# Patient Record
Sex: MALE | Race: WHITE | NOT HISPANIC OR LATINO | Employment: OTHER | URBAN - METROPOLITAN AREA
[De-identification: names, ages, dates, MRNs, and addresses within clinical notes are randomized per-mention and may not be internally consistent; named-entity substitution may affect disease eponyms.]

---

## 2017-06-05 ENCOUNTER — ALLSCRIPTS OFFICE VISIT (OUTPATIENT)
Dept: OTHER | Facility: OTHER | Age: 82
End: 2017-06-05

## 2017-07-03 ENCOUNTER — APPOINTMENT (OUTPATIENT)
Dept: LAB | Facility: HOSPITAL | Age: 82
End: 2017-07-03
Attending: FAMILY MEDICINE
Payer: COMMERCIAL

## 2017-07-03 ENCOUNTER — TRANSCRIBE ORDERS (OUTPATIENT)
Dept: ADMINISTRATIVE | Facility: HOSPITAL | Age: 82
End: 2017-07-03

## 2017-07-03 DIAGNOSIS — E03.9 UNSPECIFIED HYPOTHYROIDISM: Primary | ICD-10-CM

## 2017-07-03 DIAGNOSIS — E03.9 UNSPECIFIED HYPOTHYROIDISM: ICD-10-CM

## 2017-07-03 LAB — TSH SERPL DL<=0.05 MIU/L-ACNC: 1.73 UIU/ML (ref 0.36–3.74)

## 2017-07-03 PROCEDURE — 36415 COLL VENOUS BLD VENIPUNCTURE: CPT

## 2017-07-03 PROCEDURE — 84443 ASSAY THYROID STIM HORMONE: CPT

## 2017-07-05 ENCOUNTER — GENERIC CONVERSION - ENCOUNTER (OUTPATIENT)
Dept: OTHER | Facility: OTHER | Age: 82
End: 2017-07-05

## 2017-09-07 ENCOUNTER — ALLSCRIPTS OFFICE VISIT (OUTPATIENT)
Dept: OTHER | Facility: OTHER | Age: 82
End: 2017-09-07

## 2017-09-07 DIAGNOSIS — M25.552 PAIN IN LEFT HIP: ICD-10-CM

## 2017-09-08 ENCOUNTER — HOSPITAL ENCOUNTER (OUTPATIENT)
Dept: RADIOLOGY | Facility: HOSPITAL | Age: 82
Discharge: HOME | End: 2017-09-08
Payer: COMMERCIAL

## 2017-09-08 DIAGNOSIS — M25.552 PAIN IN LEFT HIP: ICD-10-CM

## 2017-09-08 PROCEDURE — 73502 X-RAY EXAM HIP UNI 2-3 VIEWS: CPT

## 2017-09-11 ENCOUNTER — GENERIC CONVERSION - ENCOUNTER (OUTPATIENT)
Dept: OTHER | Facility: OTHER | Age: 82
End: 2017-09-11

## 2017-09-14 ENCOUNTER — GENERIC CONVERSION - ENCOUNTER (OUTPATIENT)
Dept: OTHER | Facility: OTHER | Age: 82
End: 2017-09-14

## 2017-09-23 ENCOUNTER — ALLSCRIPTS OFFICE VISIT (OUTPATIENT)
Dept: OTHER | Facility: OTHER | Age: 82
End: 2017-09-23

## 2017-10-02 ENCOUNTER — GENERIC CONVERSION - ENCOUNTER (OUTPATIENT)
Dept: OTHER | Facility: OTHER | Age: 82
End: 2017-10-02

## 2017-10-02 LAB
LEFT EYE DIABETIC RETINOPATHY: NORMAL
RIGHT EYE DIABETIC RETINOPATHY: NORMAL

## 2017-11-09 ENCOUNTER — GENERIC CONVERSION - ENCOUNTER (OUTPATIENT)
Dept: OTHER | Facility: OTHER | Age: 82
End: 2017-11-09

## 2017-11-09 ENCOUNTER — TRANSCRIBE ORDERS (OUTPATIENT)
Dept: ADMINISTRATIVE | Facility: HOSPITAL | Age: 82
End: 2017-11-09

## 2017-11-09 DIAGNOSIS — M25.552 LEFT HIP PAIN: Primary | ICD-10-CM

## 2017-11-09 DIAGNOSIS — M16.12 PRIMARY OSTEOARTHRITIS OF LEFT HIP: ICD-10-CM

## 2017-11-15 ENCOUNTER — HOSPITAL ENCOUNTER (OUTPATIENT)
Dept: RADIOLOGY | Facility: HOSPITAL | Age: 82
Discharge: HOME | End: 2017-11-15
Attending: ORTHOPAEDIC SURGERY | Admitting: RADIOLOGY
Payer: COMMERCIAL

## 2017-11-15 DIAGNOSIS — M25.552 LEFT HIP PAIN: ICD-10-CM

## 2017-11-15 PROCEDURE — 20610 DRAIN/INJ JOINT/BURSA W/O US: CPT

## 2017-11-15 PROCEDURE — 77002 NEEDLE LOCALIZATION BY XRAY: CPT

## 2017-11-15 RX ORDER — BUPIVACAINE HYDROCHLORIDE 2.5 MG/ML
30 INJECTION, SOLUTION EPIDURAL; INFILTRATION; INTRACAUDAL
Status: COMPLETED | OUTPATIENT
Start: 2017-11-15 | End: 2017-11-15

## 2017-11-15 RX ORDER — METHYLPREDNISOLONE ACETATE 80 MG/ML
80 INJECTION, SUSPENSION INTRA-ARTICULAR; INTRALESIONAL; INTRAMUSCULAR; SOFT TISSUE
Status: COMPLETED | OUTPATIENT
Start: 2017-11-15 | End: 2017-11-15

## 2017-11-15 RX ORDER — LIDOCAINE HYDROCHLORIDE 10 MG/ML
10 INJECTION, SOLUTION INFILTRATION; PERINEURAL
Status: COMPLETED | OUTPATIENT
Start: 2017-11-15 | End: 2017-11-15

## 2017-11-15 RX ADMIN — BUPIVACAINE HYDROCHLORIDE 2 ML: 2.5 INJECTION, SOLUTION EPIDURAL; INFILTRATION; INTRACAUDAL at 13:20

## 2017-11-15 RX ADMIN — METHYLPREDNISOLONE ACETATE 80 MG: 80 INJECTION, SUSPENSION INTRA-ARTICULAR; INTRALESIONAL; INTRAMUSCULAR; SOFT TISSUE at 13:20

## 2017-11-15 RX ADMIN — LIDOCAINE HYDROCHLORIDE 2 ML: 10 INJECTION, SOLUTION INFILTRATION; PERINEURAL at 13:20

## 2017-11-15 RX ADMIN — IOHEXOL 3 ML: 300 INJECTION, SOLUTION INTRAVENOUS at 13:20

## 2017-12-04 ENCOUNTER — TRANSCRIBE ORDERS (OUTPATIENT)
Dept: ADMINISTRATIVE | Facility: HOSPITAL | Age: 82
End: 2017-12-04

## 2017-12-04 ENCOUNTER — APPOINTMENT (OUTPATIENT)
Dept: LAB | Facility: HOSPITAL | Age: 82
End: 2017-12-04
Attending: FAMILY MEDICINE
Payer: COMMERCIAL

## 2017-12-04 DIAGNOSIS — E11.9 CONTROLLED TYPE 2 DIABETES MELLITUS WITHOUT COMPLICATION, WITHOUT LONG-TERM CURRENT USE OF INSULIN (HCC): ICD-10-CM

## 2017-12-04 DIAGNOSIS — I51.9 MYXEDEMA HEART DISEASE: ICD-10-CM

## 2017-12-04 DIAGNOSIS — E03.9 MYXEDEMA HEART DISEASE: Primary | ICD-10-CM

## 2017-12-04 DIAGNOSIS — I51.9 MYXEDEMA HEART DISEASE: Primary | ICD-10-CM

## 2017-12-04 DIAGNOSIS — E03.9 MYXEDEMA HEART DISEASE: ICD-10-CM

## 2017-12-04 LAB
ALBUMIN SERPL BCP-MCNC: 3.4 G/DL (ref 3.5–5)
ALP SERPL-CCNC: 48 U/L (ref 46–116)
ALT SERPL W P-5'-P-CCNC: 25 U/L (ref 12–78)
ANION GAP SERPL CALCULATED.3IONS-SCNC: 5 MMOL/L (ref 4–13)
AST SERPL W P-5'-P-CCNC: 18 U/L (ref 5–45)
BILIRUB SERPL-MCNC: 0.82 MG/DL (ref 0.2–1)
BUN SERPL-MCNC: 21 MG/DL (ref 5–25)
CALCIUM SERPL-MCNC: 8.9 MG/DL (ref 8.3–10.1)
CHLORIDE SERPL-SCNC: 105 MMOL/L (ref 100–108)
CO2 SERPL-SCNC: 30 MMOL/L (ref 21–32)
CREAT SERPL-MCNC: 0.99 MG/DL (ref 0.6–1.3)
CREAT UR-MCNC: 88.9 MG/DL
ERYTHROCYTE [DISTWIDTH] IN BLOOD BY AUTOMATED COUNT: 13.1 % (ref 11.6–15.1)
EST. AVERAGE GLUCOSE BLD GHB EST-MCNC: 131 MG/DL
GFR SERPL CREATININE-BSD FRML MDRD: 71 ML/MIN/1.73SQ M
GLUCOSE P FAST SERPL-MCNC: 131 MG/DL (ref 65–99)
HBA1C MFR BLD: 6.2 % (ref 4.2–6.3)
HCT VFR BLD AUTO: 40.6 % (ref 36.5–49.3)
HGB BLD-MCNC: 13.6 G/DL (ref 12–17)
MCH RBC QN AUTO: 30.2 PG (ref 26.8–34.3)
MCHC RBC AUTO-ENTMCNC: 33.5 G/DL (ref 31.4–37.4)
MCV RBC AUTO: 90 FL (ref 82–98)
MICROALBUMIN UR-MCNC: <5 MG/L (ref 0–20)
MICROALBUMIN/CREAT 24H UR: <6 MG/G CREATININE (ref 0–30)
PLATELET # BLD AUTO: 158 THOUSANDS/UL (ref 149–390)
PMV BLD AUTO: 10 FL (ref 8.9–12.7)
POTASSIUM SERPL-SCNC: 4.1 MMOL/L (ref 3.5–5.3)
PROT SERPL-MCNC: 6.7 G/DL (ref 6.4–8.2)
RBC # BLD AUTO: 4.51 MILLION/UL (ref 3.88–5.62)
SODIUM SERPL-SCNC: 140 MMOL/L (ref 136–145)
TSH SERPL DL<=0.05 MIU/L-ACNC: 3.66 UIU/ML (ref 0.36–3.74)
WBC # BLD AUTO: 7.06 THOUSAND/UL (ref 4.31–10.16)

## 2017-12-04 PROCEDURE — 82570 ASSAY OF URINE CREATININE: CPT | Performed by: FAMILY MEDICINE

## 2017-12-04 PROCEDURE — 85027 COMPLETE CBC AUTOMATED: CPT

## 2017-12-04 PROCEDURE — 80053 COMPREHEN METABOLIC PANEL: CPT

## 2017-12-04 PROCEDURE — 84443 ASSAY THYROID STIM HORMONE: CPT

## 2017-12-04 PROCEDURE — 36415 COLL VENOUS BLD VENIPUNCTURE: CPT

## 2017-12-04 PROCEDURE — 82043 UR ALBUMIN QUANTITATIVE: CPT | Performed by: FAMILY MEDICINE

## 2017-12-04 PROCEDURE — 83036 HEMOGLOBIN GLYCOSYLATED A1C: CPT

## 2017-12-06 ENCOUNTER — GENERIC CONVERSION - ENCOUNTER (OUTPATIENT)
Dept: OTHER | Facility: OTHER | Age: 82
End: 2017-12-06

## 2017-12-14 ENCOUNTER — GENERIC CONVERSION - ENCOUNTER (OUTPATIENT)
Dept: OTHER | Facility: OTHER | Age: 82
End: 2017-12-14

## 2017-12-14 ENCOUNTER — ALLSCRIPTS OFFICE VISIT (OUTPATIENT)
Dept: OTHER | Facility: OTHER | Age: 82
End: 2017-12-14

## 2017-12-15 NOTE — PROGRESS NOTES
Assessment  1  Medicare annual wellness visit, subsequent (V70 0) (Z00 00)   2  Loss of hearing (389 9) (H91 90)   3  Controlled diabetes mellitus type II without complication (731 79) (Y96 0)   4  Hypothyroidism (244 9) (E03 9)    Plan  Controlled diabetes mellitus type II without complication, Hypothyroidism    · Follow-up visit in 6 months Evaluation and Treatment  Follow-up  Status: Hold For -Scheduling  Requested for: 56FGQ1997  Medicare annual wellness visit, subsequent    · Follow-up visit in 1 year Evaluation and Treatment  Follow-up  Status: Hold For -Scheduling  Requested for: 68GWW0566   · *VB - Fall Risk Assessment  (Dx Z13 89 Screen for Neurologic Disorder);Status:Complete;   Done: 43CFC0587 12:00AM   · *VB - Urinary Incontinence Screen (Dx Z13 89 Screen for UI); Status:Complete;   Done:97Rjf4226 12:00AM   · *VB-Depression Screening; Status:Complete;   Done: 23JOG8825 12:00AM    Discussion/Summary    Reviewed recent iuhaL8Y 6 2Microalbumin negCMP, CBC OKTSH OKImmuniz are up to dateMeds sameNo labs next time  Chief Complaint  Patient presents for a 6 month follow up      History of Present Illness  Generally feeling goodLeft hip better since he had injection from ortho (see note)His dog had leptospirosis and they all needed to be treated      Review of Systems   Constitutional: as noted in HPI  Eyes: recent eye check - new prescription  ENT: hearing loss-- and-- has cochlear implant  Cardiovascular: no chest pain,-- no palpitations-- and-- no extremity edema  Respiratory: no cough-- and-- no shortness of breath during exertion  Gastrointestinal: BM daily / no dyspepsia  Genitourinary: nocturia-- and-- 1-2 times a night  Musculoskeletal: as noted in HPI  Integumentary: chronic skin changes - has had multiple lesions removed - Dr Kostas patel, but-- no rashes-- and-- no skin lesions  Neurological: no headache-- and-- no confusion  Psychiatric: no anxiety-- and-- no depression  Active Problems  1  Allergic rhinitis (477 9) (J30 9)   2  Controlled diabetes mellitus type II without complication (960 52) (Q75 6)   3  Hypothyroidism (244 9) (E03 9)   4  Left hip pain (719 45) (M25 552)   5  Leptospirosis (100 9) (A27 9)   6  Loss of hearing (389 9) (H91 90)   7  Male erectile dysfunction, unspecified (607 84) (N52 9)   8  Need for influenza vaccination (V04 81) (Z23)   9  Osteoarthritis of knee (715 36) (M17 10)   10  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)   11  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)   12  Primary osteoarthritis of left hip (715 15) (M16 12)   13  RBBB (right bundle branch block) (426 4) (I45 10)   14  Seborrheic keratoses (702 19) (L82 1)    Past Medical History  1  History of Abnormal loss of weight (783 21) (R63 4)   2  History of Bruising (924 9) (T14 8XXA)   3  History of Colon, diverticulosis (562 10) (K57 30)   4  History of diverticulitis of colon (V12 79) (Z87 19)    Surgical History  1  History of Appendectomy   2  History of Cataract Surgery   3  History of Colon Surgery   4  History of Colonoscopy (Fiberoptic)   5  History of Inner Ear Surgery Cochlear Device Implantation   6  History of Shoulder Surgery    Family History  Daughter    1  Family history of Diabetes Mellitus (V18 0)  Sister    2  Family history of Coronary Artery Disease (V17 49)   3  Family history of Diabetes Mellitus (V18 0)    The family history was reviewed and updated today  Social History   · Being A Social Drinker   · Never A Smoker  The social history was reviewed and updated today  Current Meds   1  Levothyroxine Sodium 25 MCG Oral Tablet; Take 1 tablet by mouth  daily; Therapy: 90VUB3187 to (Evaluate:28Eyo1997)  Requested for: 81SJY7033; Last Rx:13Jwf6349 Ordered   2  Viagra 50 MG Oral Tablet; TAKE AS DIRECTED; Therapy: 39UMT7610 to (Evaluate:29Mhv6692); Last Rx:13Jfg9183 Ordered    The medication list was reviewed and updated today  Allergies  1   MetroNIDAZOLE TABS    Vitals  Vital Signs    Recorded: 09NHC2653 06:59PM   Temperature 97 1 F, Tympanic   Heart Rate 78, R Radial   Pulse Quality Normal, R Radial   Respiration Quality Normal   Respiration 16   Systolic 627, LUE, Sitting   Diastolic 70, LUE, Sitting   Height 5 ft 9 in   Weight 149 lb 4 oz   BMI Calculated 22 04   BSA Calculated 1 82   Pain Scale 0     Physical Exam   Constitutional  General appearance: No acute distress, well appearing and well nourished  Pulmonary  Respiratory effort: No increased work of breathing or signs of respiratory distress  Auscultation of lungs: Clear to auscultation, equal breath sounds bilaterally, no wheezes, no rales, no rhonci  Cardiovascular  Auscultation of heart: Normal rate and rhythm, normal S1 and S2, without murmurs     Examination of extremities for edema and/or varicosities: Normal    Carotid pulses: Normal    Musculoskeletal  Gait and station: Normal    Psychiatric  Orientation to person, place and time: Normal    Mood and affect: Normal          Results/Data  *VB - Fall Risk Assessment  (Dx Z13 89 Screen for Neurologic Disorder) 07CLA6070 12:00AM AbkyliesaeDandre     Test Name Result Flag Reference   Falls Risk      No falls in the past year     *VB - Urinary Incontinence Screen (Dx Z13 89 Screen for UI) 53EZW6410 12:00AM MartineDaliaraimundo Nava     Test Name Result Flag Reference   Urinary Incontinence Assessment 32MPX1317       *VB-Depression Screening 15IPH3815 12:00AM Neva Pyle     Test Name Result Flag Reference   Depression Scale Result      Depression Screen - Negative For Symptoms     (1) CBC/ PLT (NO DIFF) 09HCW5385 07:43AM Neva Levyara     Test Name Result Flag Reference   HEMATOCRIT 40 6 %  36 5-49 3   HEMOGLOBIN 13 6 g/dL  12 0-17 0   MCHC 33 5 g/dL  31 4-37 4   MCH 30 2 pg  26 8-34 3   MCV 90 fL  82-98   PLATELET COUNT 313 Thousands/uL  149-390   RBC COUNT 4 51 Million/uL  3 88-5 62   RDW 13 1 %  11 6-15 1   WBC COUNT 7 06 Thousand/uL  4 31-10 16   MPV 10 0 fL  8 9-12 7     (1) COMPREHENSIVE METABOLIC PANEL 73NKP9689 44:18PP Evy Farley     Test Name Result Flag Reference   SODIUM 140 mmol/L  136-145   POTASSIUM 4 1 mmol/L  3 5-5 3   CHLORIDE 105 mmol/L  100-108   CARBON DIOXIDE 30 mmol/L  21-32   ANION GAP (CALC) 5 mmol/L  4-13   BLOOD UREA NITROGEN 21 mg/dL  5-25   CREATININE 0 99 mg/dL  0 60-1 30   Standardized to IDMS reference method   CALCIUM 8 9 mg/dL  8 3-10 1   BILI, TOTAL 0 82 mg/dL  0 20-1 00   ALK PHOSPHATAS 48 U/L     ALT (SGPT) 25 U/L  12-78   Specimen collection should occur prior to Sulfasalazine administration due to the potential for falsely depressed results  AST(SGOT) 18 U/L  5-45   Specimen collection should occur prior to Sulfasalazine administration due to the potential for falsely depressed results  ALBUMIN 3 4 g/dL L 3 5-5 0   TOTAL PROTEIN 6 7 g/dL  6 4-8 2   eGFR 71 ml/min/1 73sq m     This is a patient instruction: Patient fasting for 8 hours or longer recommended  National Kidney Disease Education Program recommendations are as follows: GFR calculation is accurate only with a steady state creatinine Chronic Kidney disease less than 60 ml/min/1 73 sq  meters Kidney failure less than 15 ml/min/1 73 sq  meters  GLUCOSE FASTING 131 mg/dL H 65-99   Specimen collection should occur prior to Sulfasalazine administration due to the potential for falsely depressed results  Specimen collection should occur prior to Sulfapyridine administration due to the potential for falsely elevated results  (1) TSH 69LJD9027 07:43AM Evy Farley     Test Name Result Flag Reference   TSH 3 663 uIU/mL  0 358-3 740   This is a patient instruction: This test is non-fasting  Please drink two glasses of water morning of bloodwork  Patients undergoing fluorescein dye angiography may retain small amounts of fluorescein in the body for 48-72 hours post procedure  Samples containing fluorescein can produce falsely depressed TSH values   If the patient had this procedure,a specimen should be resubmitted post fluorescein clearance  (1) MICROALBUMIN CREATININE RATIO, RANDOM URINE 69DGH1461 07:43AM Kimi Farley     Test Name Result Flag Reference   MICROALBUMIN/ CREAT R <6 mg/g creatinine  0-30   MICROALBUMIN,URINE <5 0 mg/L  0 0-20 0   CREATININE URINE 88 9 mg/dL       (1) HEMOGLOBIN A1C 55QJU5885 07:43AM Kimi Farley     Test Name Result Flag Reference   HEMOGLOBIN A1C 6 2 %  4 2-6 3   EST  AVG   GLUCOSE 131 mg/dl       Signatures   Electronically signed by : JODI Mehta ; Dec 14 2017  8:35PM EST                       (Author)

## 2018-01-10 NOTE — RESULT NOTES
Verified Results  * XR HIP/PELV 2-3 VWS LEFT W PELVIS IF PERFORMED 40Xjd8062 09:17AM Atul Ramirezt Order Number: [id number]     Test Name Result Flag Reference   * XR HIP/PELV 2-3 VWS LEFT (Report)     LEFT HIP     INDICATION: Left hip pain  COMPARISON: None     VIEWS: AP pelvis and 2 coned down views     IMAGES: 3     FINDINGS:     There is no fracture or dislocation  There is mild joint space narrowing with acetabular and femoral osteophytes  There is no evidence of erosive arthropathy  No lytic or blastic lesions are seen  Soft tissues are unremarkable  IMPRESSION:     Degenerative changes  No acute bony abnormality in the left hip         Workstation performed: [id number]     Signed by:   Jeromy Storey MD   9/11/17

## 2018-01-11 NOTE — RESULT NOTES
Verified Results  (1) CBC/PLT/DIFF 17Oct2016 07:24AM Jos Resendiz     Test Name Result Flag Reference   WBC COUNT 5 97 Thousand/uL  4 31-10 16   RBC COUNT 4 49 Million/uL  3 88-5 62   HEMOGLOBIN 13 6 g/dL  12 0-17 0   HEMATOCRIT 39 8 %  36 5-49 3   MCV 89 fL  82-98   MCH 30 3 pg  26 8-34 3   MCHC 34 2 g/dL  31 4-37 4   RDW 12 6 %  11 6-15 1   MPV 10 3 fL  8 9-12 7   PLATELET COUNT 700 Thousands/uL  149-390   nRBC AUTOMATED 0 /100 WBCs     NEUTROPHILS RELATIVE PERCENT 56 %  43-75   LYMPHOCYTES RELATIVE PERCENT 33 %  14-44   MONOCYTES RELATIVE PERCENT 8 %  4-12   EOSINOPHILS RELATIVE PERCENT 3 %  0-6   BASOPHILS RELATIVE PERCENT 0 %  0-1   NEUTROPHILS ABSOLUTE COUNT 3 33 Thousands/?L  1 85-7 62   LYMPHOCYTES ABSOLUTE COUNT 1 95 Thousands/?L  0 60-4 47   MONOCYTES ABSOLUTE COUNT 0 47 Thousand/?L  0 17-1 22   EOSINOPHILS ABSOLUTE COUNT 0 20 Thousand/?L  0 00-0 61   BASOPHILS ABSOLUTE COUNT 0 02 Thousands/?L  0 00-0 10     (1) COMPREHENSIVE METABOLIC PANEL 50VYU7647 20:96CM Deonna Farley     Test Name Result Flag Reference   GLUCOSE,RANDM 114 mg/dL     If the patient is fasting, the ADA then defines impaired fasting glucose as > 100 mg/dL and diabetes as > or equal to 123 mg/dL  SODIUM 142 mmol/L  136-145   POTASSIUM 3 9 mmol/L  3 5-5 3   CHLORIDE 105 mmol/L  100-108   CARBON DIOXIDE 30 mmol/L  21-32   ANION GAP (CALC) 7 mmol/L  4-13   BLOOD UREA NITROGEN 19 mg/dL  5-25   CREATININE 1 05 mg/dL  0 60-1 30   Standardized to IDMS reference method   CALCIUM 8 5 mg/dL  8 3-10 1   BILI, TOTAL 0 64 mg/dL  0 20-1 00   ALK PHOSPHATAS 47 U/L     ALT (SGPT) 21 U/L  12-78   AST(SGOT) 19 U/L  5-45   ALBUMIN 3 4 g/dL L 3 5-5 0   TOTAL PROTEIN 7 0 g/dL  6 4-8 2   eGFR Non-African American      >60 0 ml/min/1 73sq [facility] Disease Education Program recommendations are as follows:  GFR calculation is accurate only with a steady state creatinine  Chronic Kidney disease less than 60 ml/min/1 73 sq  meters  Kidney failure less than 15 ml/min/1 73 sq  meters  (1) TSH 24JQO5972 07:24AM PatitosaeLian     Test Name Result Flag Reference   TSH 6 434 uIU/mL H 0 358-3 740   Patients undergoing fluorescein dye angiography may retain small amounts of fluorescein in the body for 48-72 hours post procedure  Samples containing fluorescein can produce falsely depressed TSH values  If the patient had this procedure,a specimen should be resubmitted post fluorescein clearance  (1) LIPID PANEL FASTING W DIRECT LDL REFLEX 61FRG5393 07:24AM Patitosae Tristanliyah Vitale     Test Name Result Flag Reference   CHOLESTEROL 179 mg/dL     LDL CHOLESTEROL CALCULATED 118 mg/dL H 0-100   Triglyceride:         Normal              <150 mg/dl       Borderline High    150-199 mg/dl       High               200-499 mg/dl       Very High          >499 mg/dl  Cholesterol:         Desirable        <200 mg/dl      Borderline High  200-239 mg/dl      High             >239 mg/dl  HDL Cholesterol:        High    >59 mg/dL      Low     <41 mg/dL  LDL Cholesterol:        Optimal          <100 mg/dl        Near Optimal     100-129 mg/dl        Above Optimal          Borderline High   130-159 mg/dl          High              160-189 mg/dl          Very High        >189 mg/dl  LDL CALCULATED:    This screening LDL is a calculated result  It does not have the accuracy of the Direct Measured LDL in the monitoring of patients with hyperlipidemia and/or statin therapy  Direct Measure LDL (LAC918) must be ordered separately in these patients  TRIGLYCERIDES 86 mg/dL  <=150   Specimen collection should occur prior to N-Acetylcysteine or Metamizole administration due to the potential for falsely depressed results  HDL,DIRECT 44 mg/dL  40-60   Specimen collection should occur prior to Metamizole administration due to the potential for falsely depressed results       (1) HEMOGLOBIN A1C 17Oct2016 07:24AM Patitosae Tristanliyah Vitale     Test Name Result Flag Reference HEMOGLOBIN A1C 6 2 %  4 2-6 3   EST  AVG   GLUCOSE 131 mg/dl       (1) MICROALBUMIN CREATININE RATIO, RANDOM URINE 17Oct2016 07:24AM Cheryle Farley     Test Name Result Flag Reference   MICROALBUMIN/ CREAT R <7 mg/g creatinine  0-30   MICROALBUMIN,URINE <5 0 mg/L  0 0-20 0   CREATININE URINE 71 5 mg/dL

## 2018-01-13 VITALS
HEART RATE: 68 BPM | HEIGHT: 69 IN | TEMPERATURE: 97.8 F | WEIGHT: 145.8 LBS | BODY MASS INDEX: 21.59 KG/M2 | SYSTOLIC BLOOD PRESSURE: 116 MMHG | RESPIRATION RATE: 16 BRPM | DIASTOLIC BLOOD PRESSURE: 82 MMHG

## 2018-01-13 NOTE — RESULT NOTES
Verified Results  (1) TSH [id number] 09:56AM Carri Farley Miss     Test Name Result Flag Reference   TSH 1 733 uIU/mL  0 358-3 740   This bloodwork is non-fasting  Please drink two glasses of water morning of  bloodwork  Patients undergoing fluorescein dye angiography may retain small amounts of fluorescein in the body for 48-72 hours post procedure  Samples containing fluorescein can produce falsely depressed TSH values  If the patient had this procedure,a specimen should be resubmitted post fluorescein clearance

## 2018-01-14 VITALS
DIASTOLIC BLOOD PRESSURE: 72 MMHG | SYSTOLIC BLOOD PRESSURE: 112 MMHG | BODY MASS INDEX: 21.51 KG/M2 | RESPIRATION RATE: 16 BRPM | HEART RATE: 72 BPM | HEIGHT: 69 IN | TEMPERATURE: 97 F | WEIGHT: 145.2 LBS

## 2018-01-14 VITALS
SYSTOLIC BLOOD PRESSURE: 128 MMHG | DIASTOLIC BLOOD PRESSURE: 76 MMHG | WEIGHT: 149.8 LBS | TEMPERATURE: 98.3 F | RESPIRATION RATE: 16 BRPM | HEART RATE: 84 BPM | BODY MASS INDEX: 22.19 KG/M2 | HEIGHT: 69 IN

## 2018-01-15 NOTE — RESULT NOTES
Message   Please call - thyroid has improved on current dose  Remains just a tad "sluggish" but I would like to continue current dose and then recheck TSH before next visist and adjust dose then if needed  Happy holidays! Verified Results  (1) TSH [id number] 07:50AM Gary Farley     Test Name Result Flag Reference   TSH 3 743 uIU/mL H 0 358-3 740   Patients undergoing fluorescein dye angiography may retain small amounts of fluorescein in the body for 48-72 hours post procedure  Samples containing fluorescein can produce falsely depressed TSH values  If the patient had this procedure,a specimen should be resubmitted post fluorescein clearance

## 2018-01-17 NOTE — MISCELLANEOUS
Message  Patient's wife called c/o that her  has severe pain due to postherpetic neuralgia  He was seen 3 weeks ago by [facility] for shingles  He completed course of antiviral therapy with Famvir for 7 days  He takes Gabapentin 100 mg 2 tab   twice daily  Recommended to take extra 100 mg at bedtime today, Advil 400 mg with food  Advised to increase dose of Gabapentin tomorrow  to 300 mg twice daily  Recommended to call office tomorrow morning to schedule appointment  for reevaluation       Signatures   Electronically signed by : JODI Corbett ; Aug 21 2016  9:01PM EST                       (Author)

## 2018-01-17 NOTE — RESULT NOTES
Verified Results  * XR HIP/PELV 2-3 VWS LEFT W PELVIS IF PERFORMED [id number] 09:17AM Fredy Johnston Order Number: [id number]     Test Name Result Flag Reference   * XR HIP/PELV 2-3 VWS LEFT (Report)     LEFT HIP     INDICATION: Left hip pain  COMPARISON: None     VIEWS: AP pelvis and 2 coned down views     IMAGES: 3     FINDINGS:     There is no fracture or dislocation  There is mild joint space narrowing with acetabular and femoral osteophytes  There is no evidence of erosive arthropathy  No lytic or blastic lesions are seen  Soft tissues are unremarkable  IMPRESSION:     Degenerative changes  No acute bony abnormality in the left hip         Workstation performed: [id number]     Signed by:   Charanjit Rodrigues MD   9/11/17       Signatures   Electronically signed by : CINDY Means; Sep 14 2017  2:04PM EST                       (Author)

## 2018-01-22 VITALS
HEART RATE: 85 BPM | BODY MASS INDEX: 21.98 KG/M2 | WEIGHT: 148.38 LBS | SYSTOLIC BLOOD PRESSURE: 107 MMHG | DIASTOLIC BLOOD PRESSURE: 69 MMHG | HEIGHT: 69 IN

## 2018-01-23 VITALS
HEART RATE: 78 BPM | HEIGHT: 69 IN | WEIGHT: 149.25 LBS | SYSTOLIC BLOOD PRESSURE: 120 MMHG | DIASTOLIC BLOOD PRESSURE: 70 MMHG | BODY MASS INDEX: 22.11 KG/M2 | RESPIRATION RATE: 16 BRPM | TEMPERATURE: 97.1 F

## 2018-01-23 NOTE — PROGRESS NOTES
Assessment    1  Medicare annual wellness visit, subsequent (V70 0) (Z00 00)   2  Loss of hearing (389 9) (H91 90)    Plan  Medicare annual wellness visit, subsequent    · Follow-up visit in 1 year Evaluation and Treatment  Follow-up  Status: Hold For -  Scheduling  Requested for: 59NWX5076   · *VB - Fall Risk Assessment  (Dx Z13 89 Screen for Neurologic Disorder);  Status:Complete;   Done: 78PGN6708 12:00AM   · *VB - Urinary Incontinence Screen (Dx Z13 89 Screen for UI); Status:Complete;   Done:  69TOM2987 12:00AM   · *VB-Depression Screening; Status:Complete;   Done: 88DOA6474 12:00AM    Discussion/Summary    Immuniz are up to date    No screenings indicated    Still plays golf and tries to be active  Diet good  Chief Complaint  Patient presents for an annual wellness visit      History of Present Illness  HPI: No concerns     Feels more isolated and less active once cold weather hits    Has decreased hearing and has had cochlear implant but still struggles    MMSE 29/30   Welcome to Medicare and Wellness Visits: The patient is being seen for the subsequent annual wellness visit  Medicare Screening and Risk Factors   Hospitalizations: no previous hospitalizations  Once per lifetime medicare screening tests: ECG has not been done and AAA screening US has not yet been done  Medicare Screening Tests Risk Questions   Osteoporosis risk assessment: over 48years of age  Drug and Alcohol Use: The patient has never smoked cigarettes  The patient reports rare alcohol use  He has never used illicit drugs  Diet and Physical Activity: Current diet includes well balanced meals, 2 servings of fruit per day, 1 servings of vegetables per day, 1 servings of meat per day, 1 servings of whole grains per day and 2 cups of tea per day  He is sedentary  Exercise: walking 10 minutes per day  Mood Disorder and Cognitive Impairment Screening:   Depression screening score was 0     negative for symptoms   He denies feeling down, depressed, or hopeless over the past two weeks  He denies feeling little interest or pleasure in doing things over the past two weeks  Functional Ability/Level of Safety: He uses a hearing aid  The patient is currently able to drive with limitations, but able to do instrumental activities of daily living without limitations and able to participate in social activities without limitations  Fall risk factors: The patient fell 0 times in the past 12 months  Advance Directives: Advance directives: durable power of  for health care directives and advance directives, but no living will  Co-Managers and Medical Equipment/Suppliers: See Patient Care Team   Reviewed Updated Nicole Omer:   Last Medicare Wellness Visit Information was reviewed, patient interviewed, no change since last AW  Preventive Quality Program 65 and Older: Falls Risk: The patient fell 0 times in the past 12 months  The patient currently has no urinary incontinence symptoms  Patient Care Team    Care Team Member Role Specialty Office Number   Hellen Anderson DO  Pain Management [phone]     Review of Systems    Over the past 2 weeks, how often have you been bothered by the following problems? 1 ) Little interest or pleasure in doing things? Not at all    2 ) Feeling down, depressed or hopeless? Not at all    3 ) Trouble falling asleep or sleeping too much? Not at all    4 ) Feeling tired or having little energy? Not at all    5 ) Poor appetite or overeating? Not at all    6 ) Feeling bad about yourself, or that you are a failure, or have let yourself or your family down? Not at all    7 ) Trouble concentrating on things, such as reading a newspaper or watching television? Not at all    8 ) Moving or speaking so slowly that other people could have noticed, or the opposite, moving or speaking faster than usual? Not at all    9 ) Thoughts that you would be better off dead or of hurting yourself in some way? 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)   12  Primary osteoarthritis of left hip (715 15) (M16 12)   13  RBBB (right bundle branch block) (426 4) (I45 10)   14  Seborrheic keratoses (702 19) (L82 1)    Past Medical History    · History of Abnormal loss of weight (783 21) (R63 4)   · History of Bruising (924 9) (T14 8XXA)   · History of Colon, diverticulosis (562 10) (K57 30)   · History of diverticulitis of colon (V12 79) (Z87 19)    Surgical History    · History of Appendectomy   · History of Cataract Surgery   · History of Colon Surgery   · History of Colonoscopy (Fiberoptic)   · History of Inner Ear Surgery Cochlear Device Implantation   · History of Shoulder Surgery    Family History  Daughter    · Family history of Diabetes Mellitus (V18 0)  Sister    · Family history of Coronary Artery Disease (V17 49)   · Family history of Diabetes Mellitus (V18 0)    Social History    · Being A Social Drinker   · Never A Smoker    Current Meds   1  Levothyroxine Sodium 25 MCG Oral Tablet; Take 1 tablet by mouth  daily; Therapy: 96FUT8695 to (Evaluate:62Erg8665)  Requested for: 61OIG8357; Last   Rx:34Ojn6421 Ordered   2  Viagra 50 MG Oral Tablet; TAKE AS DIRECTED; Therapy: 20JYB0866 to (Evaluate:95Gto5991); Last Rx:[id number] Ordered    The medication list was reviewed and updated today  Allergies    1  MetroNIDAZOLE TABS    Immunizations   ** Printed in Appendix #1 below       Vitals  Signs    Temperature: 97 1 F, Tympanic  Heart Rate: 78, R Radial  Pulse Quality: Normal, R Radial  Respiration Quality: Normal  Respiration: 16  Systolic: 849, LUE, Sitting  Diastolic: 70, LUE, Sitting  Height: 5 ft 9 in  Weight: 149 lb 4 oz  BMI Calculated: 22 04  BSA Calculated: 1 82  Pain Scale: 0    Signatures   Electronically signed by : JODI Mooney ; Dec 14 2017  8:03PM EST                       (Author)    Appendix #1     Patient: Jyotsna Lan ; : 1935; MRN: [id number]      1 2 3 4 5 6    Influenza  02-Oct-2012 21-Oct-2013 17-Nov-2014 [id number] 09-Nov-2015 10-Nov-2016    PCV  03-Dec-2013 17-Dec-2014        PPSV           Td/DT  19-Sep-2011         Zoster  2012

## 2018-01-23 NOTE — RESULT NOTES
Verified Results  (1) CBC/ PLT (NO DIFF) [id number] 07:43AM Breann Francis     Test Name Result Flag Reference   HEMATOCRIT 40 6 %  36 5-49 3   HEMOGLOBIN 13 6 g/dL  12 0-17 0   MCHC 33 5 g/dL  31 4-37 4   MCH 30 2 pg  26 8-34 3   MCV 90 fL  82-98   PLATELET COUNT 560 Thousands/uL  149-390   RBC COUNT 4 51 Million/uL  3 88-5 62   RDW 13 1 %  11 6-15 1   WBC COUNT 7 06 Thousand/uL  4 31-10 16   MPV 10 0 fL  8 9-12 7     (1) COMPREHENSIVE METABOLIC PANEL [id number] 21:32RR Luz Farley     Test Name Result Flag Reference   SODIUM 140 mmol/L  136-145   POTASSIUM 4 1 mmol/L  3 5-5 3   CHLORIDE 105 mmol/L  100-108   CARBON DIOXIDE 30 mmol/L  21-32   ANION GAP (CALC) 5 mmol/L  4-13   BLOOD UREA NITROGEN 21 mg/dL  5-25   CREATININE 0 99 mg/dL  0 60-1 30   Standardized to IDMS reference method   CALCIUM 8 9 mg/dL  8 3-10 1   BILI, TOTAL 0 82 mg/dL  0 20-1 00   ALK PHOSPHATAS 48 U/L     ALT (SGPT) 25 U/L  12-78   Specimen collection should occur prior to Sulfasalazine administration due to the potential for falsely depressed results  AST(SGOT) 18 U/L  5-45   Specimen collection should occur prior to Sulfasalazine administration due to the potential for falsely depressed results  ALBUMIN 3 4 g/dL L 3 5-5 0   TOTAL PROTEIN 6 7 g/dL  6 4-8 2   eGFR 71 ml/min/1 73sq m     This is a patient instruction: Patient fasting for 8 hours or longer recommended  National Kidney Disease Education Program recommendations are as follows:  GFR calculation is accurate only with a steady state creatinine  Chronic Kidney disease less than 60 ml/min/1 73 sq  meters  Kidney failure less than 15 ml/min/1 73 sq  meters  GLUCOSE FASTING 131 mg/dL H 65-99   Specimen collection should occur prior to Sulfasalazine administration due to the potential for falsely depressed results  Specimen collection should occur prior to Sulfapyridine administration due to the potential for falsely elevated results       (1) TSH [id number] 07:43AM Jeff Bryant     Test Name Result Flag Reference   TSH 3 663 uIU/mL  0 358-3 740   This is a patient instruction: This test is non-fasting  Please drink two glasses of water morning of bloodwork  Patients undergoing fluorescein dye angiography may retain small amounts of fluorescein in the body for 48-72 hours post procedure  Samples containing fluorescein can produce falsely depressed TSH values  If the patient had this procedure,a specimen should be resubmitted post fluorescein clearance  (1) MICROALBUMIN CREATININE RATIO, RANDOM URINE [id number] 07:43AM Ana Farley     Test Name Result Flag Reference   MICROALBUMIN/ CREAT R <6 mg/g creatinine  0-30   MICROALBUMIN,URINE <5 0 mg/L  0 0-20 0   CREATININE URINE 88 9 mg/dL       (1) HEMOGLOBIN A1C [id number] 07:43AM Ana Farley     Test Name Result Flag Reference   HEMOGLOBIN A1C 6 2 %  4 2-6 3   EST  AVG   GLUCOSE 131 mg/dl

## 2018-07-02 PROBLEM — M16.12 PRIMARY OSTEOARTHRITIS OF LEFT HIP: Status: ACTIVE | Noted: 2017-06-05

## 2018-07-02 RX ORDER — SILDENAFIL 50 MG/1
TABLET, FILM COATED ORAL
COMMUNITY
Start: 2013-01-24 | End: 2018-07-05 | Stop reason: SDUPTHER

## 2018-07-02 RX ORDER — LEVOTHYROXINE SODIUM 0.03 MG/1
1 TABLET ORAL DAILY
COMMUNITY
Start: 2016-11-10 | End: 2018-12-06 | Stop reason: DRUGHIGH

## 2018-07-05 ENCOUNTER — OFFICE VISIT (OUTPATIENT)
Dept: FAMILY MEDICINE CLINIC | Facility: CLINIC | Age: 83
End: 2018-07-05
Payer: COMMERCIAL

## 2018-07-05 VITALS
RESPIRATION RATE: 16 BRPM | SYSTOLIC BLOOD PRESSURE: 116 MMHG | TEMPERATURE: 98.7 F | DIASTOLIC BLOOD PRESSURE: 78 MMHG | OXYGEN SATURATION: 97 % | BODY MASS INDEX: 21.62 KG/M2 | WEIGHT: 151 LBS | HEART RATE: 74 BPM | HEIGHT: 70 IN

## 2018-07-05 DIAGNOSIS — E03.9 ACQUIRED HYPOTHYROIDISM: Primary | ICD-10-CM

## 2018-07-05 DIAGNOSIS — M79.662 PAIN OF LEFT CALF: ICD-10-CM

## 2018-07-05 DIAGNOSIS — M16.12 PRIMARY OSTEOARTHRITIS OF LEFT HIP: ICD-10-CM

## 2018-07-05 DIAGNOSIS — N52.9 ERECTILE DYSFUNCTION, UNSPECIFIED ERECTILE DYSFUNCTION TYPE: ICD-10-CM

## 2018-07-05 DIAGNOSIS — E11.9 CONTROLLED TYPE 2 DIABETES MELLITUS WITHOUT COMPLICATION, WITHOUT LONG-TERM CURRENT USE OF INSULIN (HCC): ICD-10-CM

## 2018-07-05 DIAGNOSIS — M17.12 PRIMARY OSTEOARTHRITIS OF LEFT KNEE: ICD-10-CM

## 2018-07-05 PROCEDURE — 1036F TOBACCO NON-USER: CPT | Performed by: FAMILY MEDICINE

## 2018-07-05 PROCEDURE — 1160F RVW MEDS BY RX/DR IN RCRD: CPT | Performed by: FAMILY MEDICINE

## 2018-07-05 PROCEDURE — 99214 OFFICE O/P EST MOD 30 MIN: CPT | Performed by: FAMILY MEDICINE

## 2018-07-05 PROCEDURE — 3008F BODY MASS INDEX DOCD: CPT | Performed by: FAMILY MEDICINE

## 2018-07-05 RX ORDER — SILDENAFIL 50 MG/1
50 TABLET, FILM COATED ORAL AS NEEDED
Qty: 10 TABLET | Refills: 2 | Status: SHIPPED | OUTPATIENT
Start: 2018-07-05 | End: 2019-12-19 | Stop reason: SDUPTHER

## 2018-07-05 NOTE — ASSESSMENT & PLAN NOTE
Most recent TSH was within normal range  Continue current medication  Recheck TSH before next visit in 6 months

## 2018-07-05 NOTE — ASSESSMENT & PLAN NOTE
He responded well to it injection in the past and is being referred to Orthopedic surgery for re-evaluation and possible repeat injection

## 2018-07-05 NOTE — ASSESSMENT & PLAN NOTE
Lab Results   Component Value Date    HGBA1C 6 2 12/04/2017       No results for input(s): POCGLU in the last 72 hours      Blood Sugar Average: Last 72 hrs:     His diabetes is currently controlled by diet alone  Continue current therapeutic approach and recheck hemoglobin A1c before next visit in 6 months

## 2018-07-05 NOTE — PROGRESS NOTES
Assessment/Plan:    Generally doing well with the exception of his arthritis and his recent left calf injury  I discussed his calf injury with him and explained that I believe this was either the tear in the gastric knee medius muscle or possibly a Baker cyst that existed behind his left knee due to arthritis and internal disease of the knee  The acuteness and sharpness of the pain could be either but the swelling and tenderness in the calf makes me lean towards the Baker cyst rupture  Either way conservative therapy is appropriate and he is improving with time and increasing his activities  I believe he is a low risk and suspicion of DVT but did tell him if he developed increased swelling or any shortness of breath or chest pain he should be evaluated immediately an emergent fashion  Patient has requested the new shingles vaccine and he will call the office and to get it when we get them  Hypothyroidism  Most recent TSH was within normal range  Continue current medication  Recheck TSH before next visit in 6 months    Controlled diabetes mellitus type II without complication [facility]  Lab Results   Component Value Date    HGBA1C 6 2 12/04/2017       No results for input(s): POCGLU in the last 72 hours  Blood Sugar Average: Last 72 hrs:     His diabetes is currently controlled by diet alone  Continue current therapeutic approach and recheck hemoglobin A1c before next visit in 6 months    Primary osteoarthritis of left hip  He responded well to it injection in the past and is being referred to Orthopedic surgery for re-evaluation and possible repeat injection  Osteoarthritis of knee  Orthopedics will also evaluate his left knee       Diagnoses and all orders for this visit:    Acquired hypothyroidism  -     Comprehensive metabolic panel; Future  -     TSH baseline;  Future    Pain of left calf  Comments:  I believe this is either a injury with partial tear to his gastric knee medius muscle or possibly a ruptured Baker's cyst   Low suspicion for DVT  Primary osteoarthritis of left knee  -     Ambulatory referral to Orthopedic Surgery; Future    Primary osteoarthritis of left hip  -     Ambulatory referral to Orthopedic Surgery; Future    Controlled type 2 diabetes mellitus without complication, without long-term current use of insulin ([facility])  -     Comprehensive metabolic panel; Future  -     HEMOGLOBIN A1C W/ EAG ESTIMATION; Future    Erectile dysfunction, unspecified erectile dysfunction type  -     sildenafil (VIAGRA) 50 MG tablet; Take 1 tablet (50 mg total) by mouth as needed for erectile dysfunction          Subjective:      Patient ID: Henri Zarate is a 80 y o  male      He is here today for follow-up on his chronic conditions    This week on Monday he was lifting his golf clubs into the trunk of the car when he felt a pull in his left calf  It was initially a sharp pain that became more dull  He thought that the it would work its way out so he went to the golf course but because the pain was unable to play and went home again  His left calf swelled and got somewhat tight but seems to be getting better  Since that time it seems to be gradually improving but he is still limping some when he walks    His left hip is starting to bother him again  He had seen the orthopedist and had an injection last fall which helped  He is also having some trouble now with his left knee  He would like to see the orthopedist again and see if he might be eligible for another shot    He notes that he is having some trouble with his balance particular when trying to get on his shoes and socks were things and sort  He tends to hold onto things when getting up her down on and is aware of being cautious about falling  He has not had any falls    His diet is pretty well balanced although he has a somewhat blunted appetite  He does not drink coffee but does drink tea  He does not drink alcoholic beverages        The following portions of the patient's history were reviewed and updated as appropriate: allergies, current medications, past social history and problem list     Review of Systems   Constitutional:        See HPI   HENT: Positive for hearing loss (has cochear implant - still has trouble)  Negative for dental problem (sees dentist every 6 months)  Eyes:        Wears glasses    Eye exam yearly [facility] for [facility]ight)   Respiratory: Negative for cough and shortness of breath  Cardiovascular: Negative for chest pain, palpitations and leg swelling  Gastrointestinal:        BM daily    No dyspepsia or GERD   Genitourinary:        Voids sometimes at night   Musculoskeletal: Positive for arthralgias  See HPI   Skin: Negative for rash  Has an itchy area where he had shingles in the past   Neurological: Negative for dizziness, tremors and headaches (rarely)  Psychiatric/Behavioral: Negative for confusion, decreased concentration, dysphoric mood, self-injury and sleep disturbance  Objective:      /78 (BP Location: Left arm, Patient Position: Sitting, Cuff Size: Adult)   Pulse 74   Temp 98 7 °F (37 1 °C) (Tympanic)   Resp 16   Ht 5' 10" (1 778 m)   Wt 68 5 kg (151 lb)   SpO2 97%   BMI 21 67 kg/m²          Physical Exam   Constitutional: He is oriented to person, place, and time  He appears well-developed and well-nourished  HENT:   Very hard of hearing   Neck: No JVD present  No thyromegaly present  Cardiovascular: Normal rate and regular rhythm  No murmur heard  Pulmonary/Chest: Effort normal and breath sounds normal    Musculoskeletal: He exhibits edema (There is mild swelling of the left calf with some mildly pitting edema at the ankle)  There is tenderness from the popliteal area into the mid left calf posteriorly  There is no discoloration, redness, or warmth  Palpation along the areas of the major veins of the lower leg are nontender nor remarkable  Homans sign is negative  Lymphadenopathy:     He has no cervical adenopathy  Neurological: He is alert and oriented to person, place, and time  Psychiatric: He has a normal mood and affect  His behavior is normal  Judgment and thought content normal    Nursing note and vitals reviewed

## 2018-10-18 ENCOUNTER — IMMUNIZATION (OUTPATIENT)
Dept: FAMILY MEDICINE CLINIC | Facility: CLINIC | Age: 83
End: 2018-10-18
Payer: COMMERCIAL

## 2018-10-18 DIAGNOSIS — Z23 ENCOUNTER FOR IMMUNIZATION: ICD-10-CM

## 2018-10-18 PROCEDURE — 90471 IMMUNIZATION ADMIN: CPT

## 2018-10-18 PROCEDURE — 90662 IIV NO PRSV INCREASED AG IM: CPT

## 2018-11-28 ENCOUNTER — APPOINTMENT (OUTPATIENT)
Dept: LAB | Facility: HOSPITAL | Age: 83
End: 2018-11-28
Attending: FAMILY MEDICINE
Payer: COMMERCIAL

## 2018-11-28 DIAGNOSIS — E03.9 ACQUIRED HYPOTHYROIDISM: ICD-10-CM

## 2018-11-28 DIAGNOSIS — E11.9 CONTROLLED TYPE 2 DIABETES MELLITUS WITHOUT COMPLICATION, WITHOUT LONG-TERM CURRENT USE OF INSULIN (HCC): ICD-10-CM

## 2018-11-28 LAB
ALBUMIN SERPL BCP-MCNC: 3.5 G/DL (ref 3.5–5)
ALP SERPL-CCNC: 54 U/L (ref 46–116)
ALT SERPL W P-5'-P-CCNC: 23 U/L (ref 12–78)
ANION GAP SERPL CALCULATED.3IONS-SCNC: 9 MMOL/L (ref 4–13)
AST SERPL W P-5'-P-CCNC: 17 U/L (ref 5–45)
BILIRUB SERPL-MCNC: 0.82 MG/DL (ref 0.2–1)
BUN SERPL-MCNC: 19 MG/DL (ref 5–25)
CALCIUM SERPL-MCNC: 9.1 MG/DL (ref 8.3–10.1)
CHLORIDE SERPL-SCNC: 103 MMOL/L (ref 100–108)
CO2 SERPL-SCNC: 28 MMOL/L (ref 21–32)
CREAT SERPL-MCNC: 1.2 MG/DL (ref 0.6–1.3)
EST. AVERAGE GLUCOSE BLD GHB EST-MCNC: 143 MG/DL
GFR SERPL CREATININE-BSD FRML MDRD: 56 ML/MIN/1.73SQ M
GLUCOSE P FAST SERPL-MCNC: 163 MG/DL (ref 65–99)
HBA1C MFR BLD: 6.6 % (ref 4.2–6.3)
POTASSIUM SERPL-SCNC: 4 MMOL/L (ref 3.5–5.3)
PROT SERPL-MCNC: 7.2 G/DL (ref 6.4–8.2)
SODIUM SERPL-SCNC: 140 MMOL/L (ref 136–145)
TSH SERPL DL<=0.05 MIU/L-ACNC: 3.83 UIU/ML (ref 0.36–3.74)

## 2018-11-28 PROCEDURE — 83036 HEMOGLOBIN GLYCOSYLATED A1C: CPT

## 2018-11-28 PROCEDURE — 36415 COLL VENOUS BLD VENIPUNCTURE: CPT

## 2018-11-28 PROCEDURE — 80053 COMPREHEN METABOLIC PANEL: CPT

## 2018-11-28 PROCEDURE — 84443 ASSAY THYROID STIM HORMONE: CPT

## 2018-12-06 ENCOUNTER — OFFICE VISIT (OUTPATIENT)
Dept: FAMILY MEDICINE CLINIC | Facility: CLINIC | Age: 83
End: 2018-12-06
Payer: COMMERCIAL

## 2018-12-06 VITALS
DIASTOLIC BLOOD PRESSURE: 68 MMHG | HEIGHT: 70 IN | SYSTOLIC BLOOD PRESSURE: 110 MMHG | BODY MASS INDEX: 22.29 KG/M2 | TEMPERATURE: 97.4 F | HEART RATE: 72 BPM | WEIGHT: 155.7 LBS | RESPIRATION RATE: 16 BRPM

## 2018-12-06 DIAGNOSIS — Z23 NEED FOR SHINGLES VACCINE: ICD-10-CM

## 2018-12-06 DIAGNOSIS — E03.9 ACQUIRED HYPOTHYROIDISM: ICD-10-CM

## 2018-12-06 DIAGNOSIS — H90.6 MIXED CONDUCTIVE AND SENSORINEURAL HEARING LOSS OF BOTH EARS: ICD-10-CM

## 2018-12-06 DIAGNOSIS — E11.9 CONTROLLED TYPE 2 DIABETES MELLITUS WITHOUT COMPLICATION, WITHOUT LONG-TERM CURRENT USE OF INSULIN (HCC): Primary | ICD-10-CM

## 2018-12-06 PROCEDURE — 1160F RVW MEDS BY RX/DR IN RCRD: CPT | Performed by: FAMILY MEDICINE

## 2018-12-06 PROCEDURE — 1101F PT FALLS ASSESS-DOCD LE1/YR: CPT | Performed by: FAMILY MEDICINE

## 2018-12-06 PROCEDURE — 3008F BODY MASS INDEX DOCD: CPT | Performed by: FAMILY MEDICINE

## 2018-12-06 PROCEDURE — 99214 OFFICE O/P EST MOD 30 MIN: CPT | Performed by: FAMILY MEDICINE

## 2018-12-06 RX ORDER — LEVOTHYROXINE SODIUM 0.05 MG/1
50 TABLET ORAL DAILY
Qty: 90 TABLET | Refills: 3 | Status: SHIPPED | OUTPATIENT
Start: 2018-12-06 | End: 2019-11-16 | Stop reason: SDUPTHER

## 2018-12-06 NOTE — PROGRESS NOTES
Assessment/Plan:    He noted that he was not pleased with his last ophthalmology visit and would consider using a new physician  I gave him Dr Aide Figueroa as that option  Dr Bear Huerta retired and he is not sure what to do for on going skin checks  I suggested that given his history of recurrent skin cancers that he should be seeing somebody regularly to which he agreed  I gave him Dr Harrison Lawler surgery as an option  He is interested in getting the Shingrix vaccine and we will call him as soon as we have 1 available and he can begin is to dose series  He has had his flu shot already this year  He has had both pneumococcal vaccines  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 so will be due for a Tdap in 2021    He raised the concern over how to express and document his wishes regarding care should he have a terminal disease or his quality of his life is not present  I gave him the 5 wishes form and suggested he use this or similar form to document his wishes and then to communicate this clearly to his family and bring a copy of the form to us  Controlled diabetes mellitus type II without complication (HCC)  Lab Results   Component Value Date    HGBA1C 6 6 (H) 11/28/2018       No results for input(s): POCGLU in the last 72 hours  Blood Sugar Average: Last 72 hrs:    His recent A1c was 6 6 compared to prior 6 2 and   He reports that his diet is generally good although he does like sweets but limits them to 3 small portions a week    He did have a recent eye exam    Foot exam was done within the year and will do again at next visit    We agreed that we will not start him on any medication at present time but if his A1c does not go down her goes up further we will consider beginning metformin as long as renal function continues to be normal    We also discussed the current guidelines in regard to prescribing statins for diabetics  His lipids in the past had not been bad although the LDL was not at target  We will recheck lipids with his next blood work and discuss again based on current guidelines      Hypothyroidism  His most recent TSH was elevated mildly at 3 833  He has no symptoms of hypothyroidism except for decreased energy compared to in the past    We increased his levothyroxine from 25-50 mcg per day and will recheck a TSH before next visit  He was advised that if he starts to feel shaky as palpitations or trouble sleeping he should call us prior to that  Diagnoses and all orders for this visit:    Controlled type 2 diabetes mellitus without complication, without long-term current use of insulin (HCC)  -     Hemoglobin A1C; Future  -     Comprehensive metabolic panel; Future  -     Microalbumin / creatinine urine ratio; Future  -     Lipid panel; Future    Acquired hypothyroidism  -     TSH, 3rd generation; Future  -     levothyroxine 50 mcg tablet; Take 1 tablet (50 mcg total) by mouth daily for 360 days    Mixed conductive and sensorineural hearing loss of both ears          Subjective:      Patient ID: Nnamdi Hutchison is a 80 y o  male      He is here today for follow-up on his chronic conditions    He notes that he has been doing generally okay  He does note that he is achy and stiff when he has been sitting for a while and gets up particular few been driving his car for a bit  He never did get an injection in his left hip area that he had planned on getting when I last saw him    He feels his balance is not as good as it was  He notes that he tends to touch or gently hold onto things disease walking along at times to maintain is sense of balance  He has not fallen  He denies any loss of sensation in his legs or feet  He denies any vertigo    He notes no change in habits  He continues to be active and plays golf when he can    He does not like the winter because it restricts the morning being inside        The following portions of the patient's history were reviewed and updated as appropriate: allergies, current medications, past family history, past medical history, past social history and problem list     Review of Systems   Constitutional:        See HPI   HENT: Positive for hearing loss (has cochlear implant)  Eyes:        Recent eye exam and has new glasses  He still has trouble with fine print   Respiratory: Negative for cough and shortness of breath  Cardiovascular: Negative for chest pain, palpitations and leg swelling  Gastrointestinal:        BM regular    No dyspepsia or GERD   Musculoskeletal: Positive for arthralgias  See HPI   Skin:        Hx of skin cancers   Neurological: Negative for dizziness, tremors and headaches  Psychiatric/Behavioral: Negative for confusion, decreased concentration, dysphoric mood and sleep disturbance  The patient is not nervous/anxious  Objective:      /68   Pulse 72   Temp (!) 97 4 °F (36 3 °C) (Tympanic)   Resp 16   Ht 5' 10" (1 778 m)   Wt 70 6 kg (155 lb 11 2 oz)   BMI 22 34 kg/m²          Physical Exam   Constitutional: He is oriented to person, place, and time  He appears well-developed and well-nourished  No distress  HENT:   Decreased hearing   Eyes:   Glasses   Cardiovascular: Normal rate and regular rhythm  Pulmonary/Chest: Effort normal and breath sounds normal    Neurological: He is alert and oriented to person, place, and time  Skin:   He has multiple scars on his skin around his face and ear and scalp from sun exposure and previous removal or treatment of skin lesions   Psychiatric: He has a normal mood and affect   His behavior is normal  Judgment and thought content normal

## 2018-12-06 NOTE — ASSESSMENT & PLAN NOTE
His most recent TSH was elevated mildly at 3 833  He has no symptoms of hypothyroidism except for decreased energy compared to in the past    We increased his levothyroxine from 25-50 mcg per day and will recheck a TSH before next visit  He was advised that if he starts to feel shaky as palpitations or trouble sleeping he should call us prior to that

## 2018-12-06 NOTE — ASSESSMENT & PLAN NOTE
Lab Results   Component Value Date    HGBA1C 6 6 (H) 11/28/2018       No results for input(s): POCGLU in the last 72 hours  Blood Sugar Average: Last 72 hrs:    His recent A1c was 6 6 compared to prior 6 2 and   He reports that his diet is generally good although he does like sweets but limits them to 3 small portions a week    He did have a recent eye exam    Foot exam was done within the year and will do again at next visit    We agreed that we will not start him on any medication at present time but if his A1c does not go down her goes up further we will consider beginning metformin as long as renal function continues to be normal    We also discussed the current guidelines in regard to prescribing statins for diabetics  His lipids in the past had not been bad although the LDL was not at target    We will recheck lipids with his next blood work and discuss again based on current guidelines

## 2018-12-10 ENCOUNTER — CLINICAL SUPPORT (OUTPATIENT)
Dept: FAMILY MEDICINE CLINIC | Facility: CLINIC | Age: 83
End: 2018-12-10
Payer: COMMERCIAL

## 2018-12-10 DIAGNOSIS — Z23 NEED FOR SHINGLES VACCINE: ICD-10-CM

## 2018-12-10 DIAGNOSIS — Z23 ENCOUNTER FOR HERPES ZOSTER VACCINATION: Primary | ICD-10-CM

## 2018-12-10 PROCEDURE — 90750 HZV VACC RECOMBINANT IM: CPT

## 2018-12-10 PROCEDURE — 90471 IMMUNIZATION ADMIN: CPT

## 2019-02-18 ENCOUNTER — OFFICE VISIT (OUTPATIENT)
Dept: FAMILY MEDICINE CLINIC | Facility: CLINIC | Age: 84
End: 2019-02-18
Payer: COMMERCIAL

## 2019-02-18 VITALS
OXYGEN SATURATION: 97 % | BODY MASS INDEX: 21.42 KG/M2 | WEIGHT: 149.6 LBS | HEART RATE: 96 BPM | SYSTOLIC BLOOD PRESSURE: 122 MMHG | RESPIRATION RATE: 16 BRPM | TEMPERATURE: 101.3 F | HEIGHT: 70 IN | DIASTOLIC BLOOD PRESSURE: 86 MMHG

## 2019-02-18 DIAGNOSIS — R50.9 FEVER, UNSPECIFIED FEVER CAUSE: ICD-10-CM

## 2019-02-18 DIAGNOSIS — R68.89 FLU-LIKE SYMPTOMS: Primary | ICD-10-CM

## 2019-02-18 LAB
BACTERIA UR QL AUTO: ABNORMAL /HPF
BILIRUB UR QL STRIP: NEGATIVE
CLARITY UR: CLEAR
COLOR UR: ABNORMAL
GLUCOSE UR STRIP-MCNC: NEGATIVE MG/DL
HGB UR QL STRIP.AUTO: ABNORMAL
KETONES UR STRIP-MCNC: ABNORMAL MG/DL
LEUKOCYTE ESTERASE UR QL STRIP: NEGATIVE
MUCOUS THREADS UR QL AUTO: ABNORMAL
NITRITE UR QL STRIP: NEGATIVE
NON-SQ EPI CELLS URNS QL MICRO: ABNORMAL /HPF
PH UR STRIP.AUTO: 6 [PH] (ref 4.5–8)
PROT UR STRIP-MCNC: ABNORMAL MG/DL
RBC #/AREA URNS AUTO: ABNORMAL /HPF
SL AMB  POCT GLUCOSE, UA: NORMAL
SL AMB LEUKOCYTE ESTERASE,UA: NORMAL
SL AMB POCT BILIRUBIN,UA: NORMAL
SL AMB POCT BLOOD,UA: NORMAL
SL AMB POCT CLARITY,UA: NORMAL
SL AMB POCT COLOR,UA: NORMAL
SL AMB POCT KETONES,UA: NORMAL
SL AMB POCT NITRITE,UA: NORMAL
SL AMB POCT PH,UA: 6
SL AMB POCT SPECIFIC GRAVITY,UA: 1.02
SL AMB POCT URINE PROTEIN: NORMAL
SL AMB POCT UROBILINOGEN: 0.2
SP GR UR STRIP.AUTO: 1.02 (ref 1–1.03)
UROBILINOGEN UR QL STRIP.AUTO: 1 E.U./DL
WBC #/AREA URNS AUTO: ABNORMAL /HPF

## 2019-02-18 PROCEDURE — 1036F TOBACCO NON-USER: CPT | Performed by: FAMILY MEDICINE

## 2019-02-18 PROCEDURE — 1160F RVW MEDS BY RX/DR IN RCRD: CPT | Performed by: FAMILY MEDICINE

## 2019-02-18 PROCEDURE — 81001 URINALYSIS AUTO W/SCOPE: CPT | Performed by: FAMILY MEDICINE

## 2019-02-18 PROCEDURE — 99213 OFFICE O/P EST LOW 20 MIN: CPT | Performed by: FAMILY MEDICINE

## 2019-02-18 PROCEDURE — 81002 URINALYSIS NONAUTO W/O SCOPE: CPT | Performed by: FAMILY MEDICINE

## 2019-02-18 RX ORDER — OSELTAMIVIR PHOSPHATE 75 MG/1
75 CAPSULE ORAL EVERY 12 HOURS SCHEDULED
Qty: 10 CAPSULE | Refills: 0 | Status: SHIPPED | OUTPATIENT
Start: 2019-02-18 | End: 2019-02-23

## 2019-02-18 NOTE — ASSESSMENT & PLAN NOTE
Patient presents with flu-like symptoms  Will treat for Influenza  Start Tamiflu 75 mg 1 capsule twice daily for 5 days  Recommended to increase fluid intake, take Tylenol alternating with Ibuprofen for fever, body aches  Recommended to call office or go to the emergency room if fever persists, not able to keep anything down

## 2019-02-18 NOTE — PROGRESS NOTES
Chief Complaint   Patient presents with    Fever     Symptoms chills, trembling, body aches, and fever since Saturday  Assessment/Plan:    Flu-like symptoms  Patient presents with flu-like symptoms  Will treat for Influenza  Start Tamiflu 75 mg 1 capsule twice daily for 5 days  Recommended to increase fluid intake, take Tylenol alternating with Ibuprofen for fever, body aches  Recommended to call office or go to the emergency room if fever persists, not able to keep anything down  Fever  Will check urinalysis  Diagnoses and all orders for this visit:    Flu-like symptoms  -     oseltamivir (TAMIFLU) 75 mg capsule; Take 1 capsule (75 mg total) by mouth every 12 (twelve) hours for 5 days    Fever, unspecified fever cause  -     Urinalysis with microscopic  -     POCT urine dip          Subjective:      Patient ID: Andree Chino is a 80 y o  male  HPI     Patient presents to the office c/o fever, chills, body aches, mild dry cough, decreased appetite since Saturday, 2/16/19  Denies nausea, vomiting, diarrhea  No urinary frequency, burning with urination  Patient denies ill contacts or recent travel  Flu shot done in 10/18  The following portions of the patient's history were reviewed and updated as appropriate: allergies, current medications, past family history, past social history, past surgical history and problem list     Review of Systems   Constitutional: Positive for appetite change (decreased ), chills, fatigue and fever  Negative for activity change  HENT: Negative for congestion, ear pain, nosebleeds, sore throat and trouble swallowing  Eyes: Negative for pain, discharge, redness, itching and visual disturbance  Respiratory: Positive for cough (mild dry cough)  Negative for chest tightness, shortness of breath and wheezing  Cardiovascular: Negative for chest pain, palpitations and leg swelling     Gastrointestinal: Negative for abdominal pain, blood in stool, constipation, diarrhea, nausea and vomiting  Genitourinary: Negative for difficulty urinating, dysuria, frequency and hematuria  Musculoskeletal: Positive for arthralgias and myalgias  Skin: Negative for rash and wound  Neurological: Negative for dizziness and headaches  Hematological: Negative for adenopathy  Does not bruise/bleed easily  Psychiatric/Behavioral: Negative  Objective:      /86 (BP Location: Left arm, Patient Position: Sitting, Cuff Size: Standard)   Pulse 96   Temp (!) 101 3 °F (38 5 °C) (Tympanic)   Resp 16   Ht 5' 10" (1 778 m)   Wt 67 9 kg (149 lb 9 6 oz)   SpO2 97%   BMI 21 47 kg/m²        Physical Exam   Constitutional: He appears well-developed and well-nourished  HENT:   Head: Normocephalic and atraumatic  Right Ear: External ear normal    Left Ear: External ear normal    Mouth/Throat: Oropharynx is clear and moist    Eyes: Pupils are equal, round, and reactive to light  Conjunctivae are normal    Cardiovascular: Normal rate, regular rhythm and normal heart sounds  No murmur heard  No BL LE edema   Pulmonary/Chest: Effort normal and breath sounds normal    Abdominal: Soft  Bowel sounds are normal  There is no tenderness  Musculoskeletal: Normal range of motion  He exhibits no edema, tenderness or deformity  Skin: Skin is warm and dry  No rash noted  Psychiatric: He has a normal mood and affect  Nursing note and vitals reviewed

## 2019-02-19 DIAGNOSIS — R31.29 MICROHEMATURIA: Primary | ICD-10-CM

## 2019-02-19 NOTE — RESULT ENCOUNTER NOTE
I called and spoke to patient, he feels a little better now, but this morning he had some chills yet  I mailed lab slip to recheck urine in 1 week and he will, and he is staying hydrated  Denies any urinary symptoms that would explain blood in urine

## 2019-02-26 ENCOUNTER — APPOINTMENT (OUTPATIENT)
Dept: LAB | Facility: HOSPITAL | Age: 84
End: 2019-02-26
Payer: COMMERCIAL

## 2019-02-26 DIAGNOSIS — R31.29 MICROHEMATURIA: Primary | ICD-10-CM

## 2019-02-26 LAB
BACTERIA UR QL AUTO: ABNORMAL /HPF
BILIRUB UR QL STRIP: NEGATIVE
CLARITY UR: CLEAR
COLOR UR: YELLOW
GLUCOSE UR STRIP-MCNC: NEGATIVE MG/DL
HGB UR QL STRIP.AUTO: ABNORMAL
KETONES UR STRIP-MCNC: NEGATIVE MG/DL
LEUKOCYTE ESTERASE UR QL STRIP: NEGATIVE
NITRITE UR QL STRIP: NEGATIVE
NON-SQ EPI CELLS URNS QL MICRO: ABNORMAL /HPF
PH UR STRIP.AUTO: 6 [PH] (ref 4.5–8)
PROT UR STRIP-MCNC: NEGATIVE MG/DL
RBC #/AREA URNS AUTO: ABNORMAL /HPF
SP GR UR STRIP.AUTO: 1.01 (ref 1–1.03)
UROBILINOGEN UR QL STRIP.AUTO: 0.2 E.U./DL
WBC #/AREA URNS AUTO: ABNORMAL /HPF

## 2019-02-26 PROCEDURE — 81001 URINALYSIS AUTO W/SCOPE: CPT | Performed by: FAMILY MEDICINE

## 2019-02-27 NOTE — RESULT ENCOUNTER NOTE
Patient's wife called back and I reviewed results and instructions with her  I mailed the order and highlighted the instructions for the procedure, as well as went over them with [facility]

## 2019-03-14 ENCOUNTER — HOSPITAL ENCOUNTER (OUTPATIENT)
Dept: ULTRASOUND IMAGING | Facility: HOSPITAL | Age: 84
Discharge: HOME | End: 2019-03-14
Payer: COMMERCIAL

## 2019-03-14 DIAGNOSIS — R31.29 MICROHEMATURIA: ICD-10-CM

## 2019-03-14 PROCEDURE — 76770 US EXAM ABDO BACK WALL COMP: CPT

## 2019-03-20 ENCOUNTER — TELEPHONE (OUTPATIENT)
Dept: FAMILY MEDICINE CLINIC | Facility: CLINIC | Age: 84
End: 2019-03-20

## 2019-03-20 DIAGNOSIS — R31.29 MICROHEMATURIA: Primary | ICD-10-CM

## 2019-03-23 ENCOUNTER — TELEPHONE (OUTPATIENT)
Dept: FAMILY MEDICINE CLINIC | Facility: CLINIC | Age: 84
End: 2019-03-23

## 2019-03-23 NOTE — TELEPHONE ENCOUNTER
Patient received a summons 3/22/19 for jury duty in [address] [place] [address] Place is requesting a letter documenting that he cannot hear properly to serve on the jury  The response has to be received within 10 days of 3/22/19  Please call patient at [phone] when it is ready to be picked up

## 2019-03-25 NOTE — TELEPHONE ENCOUNTER
I did a letter and sent it to you as an attachment in an e-mail  (and copied to [address] [place] [address] in case you couldn't open)

## 2019-04-12 ENCOUNTER — TELEPHONE (OUTPATIENT)
Dept: FAMILY MEDICINE CLINIC | Facility: CLINIC | Age: 84
End: 2019-04-12

## 2019-04-12 ENCOUNTER — OFFICE VISIT (OUTPATIENT)
Dept: FAMILY MEDICINE CLINIC | Facility: CLINIC | Age: 84
End: 2019-04-12
Payer: COMMERCIAL

## 2019-04-12 VITALS
WEIGHT: 147 LBS | RESPIRATION RATE: 16 BRPM | DIASTOLIC BLOOD PRESSURE: 60 MMHG | TEMPERATURE: 99.4 F | HEART RATE: 80 BPM | HEIGHT: 70 IN | SYSTOLIC BLOOD PRESSURE: 100 MMHG | BODY MASS INDEX: 21.05 KG/M2

## 2019-04-12 DIAGNOSIS — S01.01XA LACERATION OF SCALP WITHOUT FOREIGN BODY, INITIAL ENCOUNTER: ICD-10-CM

## 2019-04-12 DIAGNOSIS — J01.90 ACUTE NON-RECURRENT SINUSITIS, UNSPECIFIED LOCATION: Primary | ICD-10-CM

## 2019-04-12 PROBLEM — R68.89 FLU-LIKE SYMPTOMS: Status: RESOLVED | Noted: 2019-02-18 | Resolved: 2019-04-12

## 2019-04-12 PROCEDURE — 99214 OFFICE O/P EST MOD 30 MIN: CPT | Performed by: NURSE PRACTITIONER

## 2019-04-12 PROCEDURE — 1036F TOBACCO NON-USER: CPT | Performed by: NURSE PRACTITIONER

## 2019-04-12 PROCEDURE — 1160F RVW MEDS BY RX/DR IN RCRD: CPT | Performed by: NURSE PRACTITIONER

## 2019-04-12 RX ORDER — AMOXICILLIN AND CLAVULANATE POTASSIUM 875; 125 MG/1; MG/1
1 TABLET, FILM COATED ORAL EVERY 12 HOURS SCHEDULED
Qty: 14 TABLET | Refills: 0 | Status: SHIPPED | OUTPATIENT
Start: 2019-04-12 | End: 2019-04-19

## 2019-04-12 RX ORDER — DEXTROMETHORPHAN HYDROBROMIDE AND PROMETHAZINE HYDROCHLORIDE 15; 6.25 MG/5ML; MG/5ML
5 SYRUP ORAL 4 TIMES DAILY PRN
Qty: 118 ML | Refills: 0 | Status: SHIPPED | OUTPATIENT
Start: 2019-04-12 | End: 2019-06-12

## 2019-06-07 ENCOUNTER — APPOINTMENT (OUTPATIENT)
Dept: LAB | Facility: HOSPITAL | Age: 84
End: 2019-06-07
Attending: FAMILY MEDICINE
Payer: COMMERCIAL

## 2019-06-07 DIAGNOSIS — E11.9 CONTROLLED TYPE 2 DIABETES MELLITUS WITHOUT COMPLICATION, WITHOUT LONG-TERM CURRENT USE OF INSULIN (HCC): ICD-10-CM

## 2019-06-07 DIAGNOSIS — E03.9 ACQUIRED HYPOTHYROIDISM: ICD-10-CM

## 2019-06-07 LAB
ALBUMIN SERPL BCP-MCNC: 3.5 G/DL (ref 3.5–5)
ALP SERPL-CCNC: 49 U/L (ref 46–116)
ALT SERPL W P-5'-P-CCNC: 19 U/L (ref 12–78)
ANION GAP SERPL CALCULATED.3IONS-SCNC: 8 MMOL/L (ref 4–13)
AST SERPL W P-5'-P-CCNC: 17 U/L (ref 5–45)
BILIRUB SERPL-MCNC: 0.83 MG/DL (ref 0.2–1)
BUN SERPL-MCNC: 21 MG/DL (ref 5–25)
CALCIUM SERPL-MCNC: 9.3 MG/DL (ref 8.3–10.1)
CHLORIDE SERPL-SCNC: 103 MMOL/L (ref 100–108)
CHOLEST SERPL-MCNC: 165 MG/DL (ref 50–200)
CO2 SERPL-SCNC: 29 MMOL/L (ref 21–32)
CREAT SERPL-MCNC: 1.08 MG/DL (ref 0.6–1.3)
CREAT UR-MCNC: 99 MG/DL
EST. AVERAGE GLUCOSE BLD GHB EST-MCNC: 137 MG/DL
GFR SERPL CREATININE-BSD FRML MDRD: 63 ML/MIN/1.73SQ M
GLUCOSE P FAST SERPL-MCNC: 132 MG/DL (ref 65–99)
HBA1C MFR BLD: 6.4 % (ref 4.2–6.3)
HDLC SERPL-MCNC: 39 MG/DL (ref 40–60)
LDLC SERPL CALC-MCNC: 112 MG/DL (ref 0–100)
MICROALBUMIN UR-MCNC: 5.6 MG/L (ref 0–20)
MICROALBUMIN/CREAT 24H UR: 6 MG/G CREATININE (ref 0–30)
NONHDLC SERPL-MCNC: 126 MG/DL
POTASSIUM SERPL-SCNC: 4.2 MMOL/L (ref 3.5–5.3)
PROT SERPL-MCNC: 7.3 G/DL (ref 6.4–8.2)
SODIUM SERPL-SCNC: 140 MMOL/L (ref 136–145)
TRIGL SERPL-MCNC: 68 MG/DL
TSH SERPL DL<=0.05 MIU/L-ACNC: 3.43 UIU/ML (ref 0.36–3.74)

## 2019-06-07 PROCEDURE — 80053 COMPREHEN METABOLIC PANEL: CPT

## 2019-06-07 PROCEDURE — 82570 ASSAY OF URINE CREATININE: CPT

## 2019-06-07 PROCEDURE — 84443 ASSAY THYROID STIM HORMONE: CPT

## 2019-06-07 PROCEDURE — 80061 LIPID PANEL: CPT

## 2019-06-07 PROCEDURE — 36415 COLL VENOUS BLD VENIPUNCTURE: CPT

## 2019-06-07 PROCEDURE — 83036 HEMOGLOBIN GLYCOSYLATED A1C: CPT

## 2019-06-07 PROCEDURE — 82043 UR ALBUMIN QUANTITATIVE: CPT

## 2019-06-09 PROBLEM — R50.9 FEVER: Status: RESOLVED | Noted: 2019-02-18 | Resolved: 2019-06-09

## 2019-06-13 ENCOUNTER — OFFICE VISIT (OUTPATIENT)
Dept: FAMILY MEDICINE CLINIC | Facility: CLINIC | Age: 84
End: 2019-06-13
Payer: COMMERCIAL

## 2019-06-13 VITALS
DIASTOLIC BLOOD PRESSURE: 70 MMHG | HEART RATE: 68 BPM | SYSTOLIC BLOOD PRESSURE: 112 MMHG | WEIGHT: 148.4 LBS | HEIGHT: 70 IN | TEMPERATURE: 97.7 F | BODY MASS INDEX: 21.24 KG/M2 | RESPIRATION RATE: 16 BRPM

## 2019-06-13 DIAGNOSIS — E03.9 ACQUIRED HYPOTHYROIDISM: ICD-10-CM

## 2019-06-13 DIAGNOSIS — H90.6 MIXED CONDUCTIVE AND SENSORINEURAL HEARING LOSS OF BOTH EARS: ICD-10-CM

## 2019-06-13 DIAGNOSIS — Z23 ENCOUNTER FOR IMMUNIZATION: ICD-10-CM

## 2019-06-13 DIAGNOSIS — E11.9 CONTROLLED TYPE 2 DIABETES MELLITUS WITHOUT COMPLICATION, WITHOUT LONG-TERM CURRENT USE OF INSULIN (HCC): Primary | ICD-10-CM

## 2019-06-13 DIAGNOSIS — R42 VERTIGO: ICD-10-CM

## 2019-06-13 DIAGNOSIS — R26.89 BALANCE DISORDER: ICD-10-CM

## 2019-06-13 PROCEDURE — 99214 OFFICE O/P EST MOD 30 MIN: CPT | Performed by: FAMILY MEDICINE

## 2019-06-13 PROCEDURE — 90471 IMMUNIZATION ADMIN: CPT

## 2019-06-13 PROCEDURE — 1036F TOBACCO NON-USER: CPT | Performed by: FAMILY MEDICINE

## 2019-06-13 PROCEDURE — 90750 HZV VACC RECOMBINANT IM: CPT

## 2019-11-15 ENCOUNTER — TELEPHONE (OUTPATIENT)
Dept: FAMILY MEDICINE CLINIC | Facility: CLINIC | Age: 84
End: 2019-11-15

## 2019-11-15 NOTE — TELEPHONE ENCOUNTER
Patient seen in June, has follow up December  Do you want labs prior? Please place order, if so, and we can mail to him

## 2019-11-16 DIAGNOSIS — E03.9 ACQUIRED HYPOTHYROIDISM: ICD-10-CM

## 2019-11-18 DIAGNOSIS — E03.9 ACQUIRED HYPOTHYROIDISM: Primary | ICD-10-CM

## 2019-11-18 DIAGNOSIS — E11.9 CONTROLLED TYPE 2 DIABETES MELLITUS WITHOUT COMPLICATION, WITHOUT LONG-TERM CURRENT USE OF INSULIN (HCC): ICD-10-CM

## 2019-11-18 RX ORDER — LEVOTHYROXINE SODIUM 0.05 MG/1
TABLET ORAL
Qty: 90 TABLET | Refills: 3 | Status: SHIPPED | OUTPATIENT
Start: 2019-11-18 | End: 2020-01-27 | Stop reason: SDUPTHER

## 2019-12-04 ENCOUNTER — APPOINTMENT (OUTPATIENT)
Dept: LAB | Facility: HOSPITAL | Age: 84
End: 2019-12-04
Attending: FAMILY MEDICINE
Payer: COMMERCIAL

## 2019-12-04 DIAGNOSIS — E11.9 CONTROLLED TYPE 2 DIABETES MELLITUS WITHOUT COMPLICATION, WITHOUT LONG-TERM CURRENT USE OF INSULIN (HCC): ICD-10-CM

## 2019-12-04 DIAGNOSIS — E03.9 ACQUIRED HYPOTHYROIDISM: ICD-10-CM

## 2019-12-04 LAB
ANION GAP SERPL CALCULATED.3IONS-SCNC: 7 MMOL/L (ref 4–13)
BUN SERPL-MCNC: 17 MG/DL (ref 5–25)
CALCIUM SERPL-MCNC: 9.2 MG/DL (ref 8.3–10.1)
CHLORIDE SERPL-SCNC: 101 MMOL/L (ref 100–108)
CO2 SERPL-SCNC: 31 MMOL/L (ref 21–32)
CREAT SERPL-MCNC: 1.09 MG/DL (ref 0.6–1.3)
EST. AVERAGE GLUCOSE BLD GHB EST-MCNC: 134 MG/DL
GFR SERPL CREATININE-BSD FRML MDRD: 62 ML/MIN/1.73SQ M
GLUCOSE P FAST SERPL-MCNC: 149 MG/DL (ref 65–99)
HBA1C MFR BLD: 6.3 % (ref 4.2–6.3)
POTASSIUM SERPL-SCNC: 4.7 MMOL/L (ref 3.5–5.3)
SODIUM SERPL-SCNC: 139 MMOL/L (ref 136–145)
TSH SERPL DL<=0.05 MIU/L-ACNC: 4 UIU/ML (ref 0.36–3.74)

## 2019-12-04 PROCEDURE — 83036 HEMOGLOBIN GLYCOSYLATED A1C: CPT

## 2019-12-04 PROCEDURE — 80048 BASIC METABOLIC PNL TOTAL CA: CPT

## 2019-12-04 PROCEDURE — 36415 COLL VENOUS BLD VENIPUNCTURE: CPT

## 2019-12-04 PROCEDURE — 84443 ASSAY THYROID STIM HORMONE: CPT

## 2019-12-19 ENCOUNTER — OFFICE VISIT (OUTPATIENT)
Dept: FAMILY MEDICINE CLINIC | Facility: CLINIC | Age: 84
End: 2019-12-19
Payer: COMMERCIAL

## 2019-12-19 VITALS
HEART RATE: 80 BPM | SYSTOLIC BLOOD PRESSURE: 100 MMHG | DIASTOLIC BLOOD PRESSURE: 70 MMHG | WEIGHT: 151 LBS | RESPIRATION RATE: 16 BRPM | HEIGHT: 70 IN | TEMPERATURE: 98.4 F | BODY MASS INDEX: 21.62 KG/M2

## 2019-12-19 DIAGNOSIS — N52.9 ERECTILE DYSFUNCTION, UNSPECIFIED ERECTILE DYSFUNCTION TYPE: ICD-10-CM

## 2019-12-19 DIAGNOSIS — E03.9 ACQUIRED HYPOTHYROIDISM: ICD-10-CM

## 2019-12-19 DIAGNOSIS — Z23 ENCOUNTER FOR ADMINISTRATION OF VACCINE: ICD-10-CM

## 2019-12-19 DIAGNOSIS — E11.9 CONTROLLED TYPE 2 DIABETES MELLITUS WITHOUT COMPLICATION, WITHOUT LONG-TERM CURRENT USE OF INSULIN (HCC): Primary | ICD-10-CM

## 2019-12-19 DIAGNOSIS — H90.6 MIXED CONDUCTIVE AND SENSORINEURAL HEARING LOSS OF BOTH EARS: ICD-10-CM

## 2019-12-19 PROCEDURE — 99214 OFFICE O/P EST MOD 30 MIN: CPT | Performed by: FAMILY MEDICINE

## 2019-12-19 PROCEDURE — 90471 IMMUNIZATION ADMIN: CPT

## 2019-12-19 PROCEDURE — 1160F RVW MEDS BY RX/DR IN RCRD: CPT

## 2019-12-19 PROCEDURE — 1101F PT FALLS ASSESS-DOCD LE1/YR: CPT | Performed by: FAMILY MEDICINE

## 2019-12-19 PROCEDURE — 90662 IIV NO PRSV INCREASED AG IM: CPT

## 2019-12-19 PROCEDURE — 1036F TOBACCO NON-USER: CPT | Performed by: FAMILY MEDICINE

## 2019-12-19 RX ORDER — SILDENAFIL 50 MG/1
50 TABLET, FILM COATED ORAL AS NEEDED
Qty: 10 TABLET | Refills: 2 | Status: SHIPPED | OUTPATIENT
Start: 2019-12-19

## 2019-12-19 NOTE — PROGRESS NOTES
Assessment/Plan:    He received a flu shot today    I reviewed his current labs which showed a creatinine of 1 09 and GFR of 62 and otherwise normal BMP    He will continue his medication as noted below with the adjusted dose for his thyroid    He will get labs in 4-6 weeks and again in 6 months before his visit as ordered    Hypothyroidism  TSH is been creeping up as an recent 1 was slightly elevated at 4 0    Given his fatigue and sense of being tired we will increase his levothyroxine from 50 micrograms to 75 micrograms daily (he will use 1/2 tablets avoid he has currently)    We will recheck a TSH in 4-6 weeks and then adjust his dose as needed from that point and prescribed new tablets based on the appropriate dosage    Controlled diabetes mellitus type II without complication ([facility] )    Lab Results   Component Value Date    HGBA1C 6 3 12/04/2019     He is not any medications for this and his current diet seems to be fine for controlling his sugar    Will do foot exam next visit    He will get an eye exam in the new year    Will get labs before next visit    Loss of hearing  No change in continues to be limiting in certain situations    Male erectile dysfunction, unspecified  He asked for a prescription for viagra and was given this       Diagnoses and all orders for this visit:    Controlled type 2 diabetes mellitus without complication, without long-term current use of insulin ([facility] )  -     Comprehensive metabolic panel; Future  -     Hemoglobin A1C; Future  -     Lipid panel; Future  -     Microalbumin / creatinine urine ratio; Future    Acquired hypothyroidism  -     TSH, 3rd generation; Future  -     Comprehensive metabolic panel; Future  -     TSH, 3rd generation;  Future    Mixed conductive and sensorineural hearing loss of both ears    Encounter for administration of vaccine  -     influenza vaccine, 2342-2250, high-dose, PF 0 5 mL (FLUZONE HIGH-DOSE)    Erectile dysfunction, unspecified erectile dysfunction type  -     sildenafil (VIAGRA) 50 MG tablet; Take 1 tablet (50 mg total) by mouth as needed for erectile dysfunction          Subjective:      Patient ID: Claudean Babe is a 80 y o  male  He is here today for follow-up on his chronic conditions    He reports that he is feeling generally well except as tired all the time    He reports that he is stiff and somewhat sore in his joints when he 1st gets up but after he starts walking he seems to loosen up and feel better  He plays golf and is in the leak during the nice weather plays 18 holes once a week    Diet is balanced but his appetite is only fair and he does not need as much as he used to  He prefers to eat small amounts more frequently during the day  He drinks tea and likes to have a small piece of cake or sweets in the evening  He does not drink alcohol or coffee    His hearing continues to be a problem for him particularly out in public or with his background noise  He tends to avoid going to meetings or events because he has difficulty understanding what people are talking about  He said there is a new fever out tracts or town that has a device for hearing impaired that shows text so that you can follow the movie  He has not been to a movie with his wife in years and they are looking for to trying this      The following portions of the patient's history were reviewed and updated as appropriate: allergies, current medications, past family history, past medical history, past social history, past surgical history and problem list     Review of Systems   Constitutional: Positive for fatigue  See HPI   HENT: Positive for hearing loss (See HPI)  Respiratory: Negative for cough and shortness of breath  Cardiovascular: Negative for chest pain, palpitations and leg swelling     Gastrointestinal:        Bowel movements are regular    Denies any dyspepsia or GERD   Genitourinary:        Voids 0-1 time per night   Musculoskeletal: Positive for arthralgias  See HPI   Neurological: Negative for dizziness, tremors and headaches  Psychiatric/Behavioral: Negative for confusion, decreased concentration, dysphoric mood and sleep disturbance  The patient is not nervous/anxious  Objective:      /70   Pulse 80   Temp 98 4 °F (36 9 °C) (Tympanic)   Resp 16   Ht 5' 10" (1 778 m)   Wt 68 5 kg (151 lb)   BMI 21 67 kg/m²          Physical Exam   Constitutional: He is oriented to person, place, and time  He appears well-developed and well-nourished  No distress  HENT:   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

## 2019-12-19 NOTE — ASSESSMENT & PLAN NOTE
Lab Results   Component Value Date    HGBA1C 6 3 12/04/2019     He is not any medications for this and his current diet seems to be fine for controlling his sugar    Will do foot exam next visit    He will get an eye exam in the new year    Will get labs before next visit

## 2019-12-19 NOTE — ASSESSMENT & PLAN NOTE
TSH is been creeping up as an recent 1 was slightly elevated at 4 0    Given his fatigue and sense of being tired we will increase his levothyroxine from 50 micrograms to 75 micrograms daily (he will use 1/2 tablets avoid he has currently)    We will recheck a TSH in 4-6 weeks and then adjust his dose as needed from that point and prescribed new tablets based on the appropriate dosage

## 2020-01-21 ENCOUNTER — APPOINTMENT (OUTPATIENT)
Dept: LAB | Facility: HOSPITAL | Age: 85
End: 2020-01-21
Attending: FAMILY MEDICINE
Payer: COMMERCIAL

## 2020-01-21 DIAGNOSIS — E03.9 ACQUIRED HYPOTHYROIDISM: ICD-10-CM

## 2020-01-21 DIAGNOSIS — E11.9 CONTROLLED TYPE 2 DIABETES MELLITUS WITHOUT COMPLICATION, WITHOUT LONG-TERM CURRENT USE OF INSULIN (HCC): ICD-10-CM

## 2020-01-21 LAB — TSH SERPL DL<=0.05 MIU/L-ACNC: 1.5 UIU/ML (ref 0.36–3.74)

## 2020-01-21 PROCEDURE — 36415 COLL VENOUS BLD VENIPUNCTURE: CPT

## 2020-01-21 PROCEDURE — 84443 ASSAY THYROID STIM HORMONE: CPT

## 2020-01-27 DIAGNOSIS — E03.9 ACQUIRED HYPOTHYROIDISM: ICD-10-CM

## 2020-01-27 RX ORDER — LEVOTHYROXINE SODIUM 0.07 MG/1
75 TABLET ORAL DAILY
Qty: 90 TABLET | Refills: 3 | Status: SHIPPED | OUTPATIENT
Start: 2020-01-27 | End: 2020-11-12 | Stop reason: SDUPTHER

## 2020-07-06 ENCOUNTER — TRANSCRIBE ORDERS (OUTPATIENT)
Dept: ADMINISTRATIVE | Facility: HOSPITAL | Age: 85
End: 2020-07-06

## 2020-07-06 ENCOUNTER — APPOINTMENT (OUTPATIENT)
Dept: LAB | Facility: HOSPITAL | Age: 85
End: 2020-07-06
Attending: FAMILY MEDICINE
Payer: COMMERCIAL

## 2020-07-06 DIAGNOSIS — E11.9 TYPE 2 DIABETES MELLITUS WITHOUT COMPLICATION, WITHOUT LONG-TERM CURRENT USE OF INSULIN (HCC): ICD-10-CM

## 2020-07-06 DIAGNOSIS — E03.9 ACQUIRED HYPOTHYROIDISM: ICD-10-CM

## 2020-07-06 DIAGNOSIS — E11.9 TYPE 2 DIABETES MELLITUS WITHOUT COMPLICATION, WITHOUT LONG-TERM CURRENT USE OF INSULIN (HCC): Primary | ICD-10-CM

## 2020-07-06 LAB
ALBUMIN SERPL BCP-MCNC: 3.6 G/DL (ref 3.5–5)
ALP SERPL-CCNC: 55 U/L (ref 46–116)
ALT SERPL W P-5'-P-CCNC: 16 U/L (ref 12–78)
ANION GAP SERPL CALCULATED.3IONS-SCNC: 8 MMOL/L (ref 4–13)
AST SERPL W P-5'-P-CCNC: 16 U/L (ref 5–45)
BILIRUB SERPL-MCNC: 0.89 MG/DL (ref 0.2–1)
BUN SERPL-MCNC: 21 MG/DL (ref 5–25)
CALCIUM SERPL-MCNC: 8.9 MG/DL (ref 8.3–10.1)
CHLORIDE SERPL-SCNC: 101 MMOL/L (ref 100–108)
CHOLEST SERPL-MCNC: 175 MG/DL (ref 50–200)
CO2 SERPL-SCNC: 28 MMOL/L (ref 21–32)
CREAT SERPL-MCNC: 1.18 MG/DL (ref 0.6–1.3)
CREAT UR-MCNC: 46 MG/DL
EST. AVERAGE GLUCOSE BLD GHB EST-MCNC: 123 MG/DL
GFR SERPL CREATININE-BSD FRML MDRD: 56 ML/MIN/1.73SQ M
GLUCOSE P FAST SERPL-MCNC: 123 MG/DL (ref 65–99)
HBA1C MFR BLD: 5.9 %
HDLC SERPL-MCNC: 34 MG/DL
LDLC SERPL CALC-MCNC: 125 MG/DL (ref 0–100)
MICROALBUMIN UR-MCNC: <5 MG/L (ref 0–20)
MICROALBUMIN/CREAT 24H UR: <11 MG/G CREATININE (ref 0–30)
NONHDLC SERPL-MCNC: 141 MG/DL
POTASSIUM SERPL-SCNC: 4.2 MMOL/L (ref 3.5–5.3)
PROT SERPL-MCNC: 7.3 G/DL (ref 6.4–8.2)
SODIUM SERPL-SCNC: 137 MMOL/L (ref 136–145)
TRIGL SERPL-MCNC: 80 MG/DL
TSH SERPL DL<=0.05 MIU/L-ACNC: 2.52 UIU/ML (ref 0.36–3.74)

## 2020-07-06 PROCEDURE — 83036 HEMOGLOBIN GLYCOSYLATED A1C: CPT

## 2020-07-06 PROCEDURE — 82043 UR ALBUMIN QUANTITATIVE: CPT

## 2020-07-06 PROCEDURE — 80061 LIPID PANEL: CPT

## 2020-07-06 PROCEDURE — 82570 ASSAY OF URINE CREATININE: CPT

## 2020-07-06 PROCEDURE — 36415 COLL VENOUS BLD VENIPUNCTURE: CPT

## 2020-07-06 PROCEDURE — 84443 ASSAY THYROID STIM HORMONE: CPT

## 2020-07-06 PROCEDURE — 80053 COMPREHEN METABOLIC PANEL: CPT

## 2020-07-13 ENCOUNTER — OFFICE VISIT (OUTPATIENT)
Dept: FAMILY MEDICINE CLINIC | Facility: CLINIC | Age: 85
End: 2020-07-13
Payer: COMMERCIAL

## 2020-07-13 VITALS
HEART RATE: 60 BPM | WEIGHT: 150 LBS | RESPIRATION RATE: 16 BRPM | SYSTOLIC BLOOD PRESSURE: 100 MMHG | BODY MASS INDEX: 21.47 KG/M2 | HEIGHT: 70 IN | TEMPERATURE: 97.5 F | DIASTOLIC BLOOD PRESSURE: 52 MMHG

## 2020-07-13 DIAGNOSIS — E03.9 ACQUIRED HYPOTHYROIDISM: ICD-10-CM

## 2020-07-13 DIAGNOSIS — E11.40 CONTROLLED TYPE 2 DIABETES MELLITUS WITH DIABETIC NEUROPATHY, WITHOUT LONG-TERM CURRENT USE OF INSULIN (HCC): Primary | ICD-10-CM

## 2020-07-13 DIAGNOSIS — R26.89 BALANCE DISORDER: ICD-10-CM

## 2020-07-13 DIAGNOSIS — E11.9 CONTROLLED TYPE 2 DIABETES MELLITUS WITHOUT COMPLICATION, WITHOUT LONG-TERM CURRENT USE OF INSULIN (HCC): ICD-10-CM

## 2020-07-13 DIAGNOSIS — M17.0 PRIMARY OSTEOARTHRITIS OF BOTH KNEES: ICD-10-CM

## 2020-07-13 DIAGNOSIS — H90.6 MIXED CONDUCTIVE AND SENSORINEURAL HEARING LOSS OF BOTH EARS: ICD-10-CM

## 2020-07-13 DIAGNOSIS — R42 VERTIGO: ICD-10-CM

## 2020-07-13 PROCEDURE — 99214 OFFICE O/P EST MOD 30 MIN: CPT | Performed by: FAMILY MEDICINE

## 2020-07-13 PROCEDURE — 4040F PNEUMOC VAC/ADMIN/RCVD: CPT | Performed by: FAMILY MEDICINE

## 2020-07-13 PROCEDURE — 3008F BODY MASS INDEX DOCD: CPT | Performed by: FAMILY MEDICINE

## 2020-07-13 PROCEDURE — 1036F TOBACCO NON-USER: CPT | Performed by: FAMILY MEDICINE

## 2020-07-13 PROCEDURE — 3044F HG A1C LEVEL LT 7.0%: CPT | Performed by: FAMILY MEDICINE

## 2020-07-13 PROCEDURE — 1160F RVW MEDS BY RX/DR IN RCRD: CPT | Performed by: FAMILY MEDICINE

## 2020-07-13 NOTE — ASSESSMENT & PLAN NOTE
Lab Results   Component Value Date    HGBA1C 5 9 (H) 07/06/2020     A1c is improved from prior 6 3 and 6 4  Microalbumin is negative  Creatinine 1 18 GFR 56    Eye exam is due and he needs a new ophthalmologist so I referred him to Dr Cristal Sharif has he preferred somebody nearby and a small our office having previously gone to Dr Natalia Morgan for years and then Dr Navya Martin at a larger group which he did not prefer    Foot exam was done today and was non-remarkable    He is on diet control of his sugars and no meds are indicated at this point    He does have mild diabetic neuropathy with numbness in his feet but no loss of sensation to testing  He has been advised to do good foot care which he still performs himself as well as wear protective shoes and inspect his feet regularly

## 2020-07-13 NOTE — PROGRESS NOTES
Chief Complaint   Patient presents with    Follow-up     knee pain     Health Maintenance   Topic Date Due    DTaP,Tdap,and Td Vaccines (1 - Tdap) 04/04/1946    Annual Physical  04/04/1953    DM Eye Exam  10/02/2018    Diabetic Foot Exam  06/13/2020    Influenza Vaccine  07/01/2020    Fall Risk  12/19/2020    HEMOGLOBIN A1C  01/06/2021    URINE MICROALBUMIN  07/06/2021    Depression Screening PHQ  07/13/2021    BMI: Adult  07/13/2021    Pneumococcal Vaccine: 65+ Years  Completed    Pneumococcal Vaccine: Pediatrics (0 to 5 Years) and At-Risk Patients (6 to 59 Years)  Aged Out    HIB Vaccine  Aged Out    Hepatitis B Vaccine  Aged Out    IPV Vaccine  Aged Out    Hepatitis A Vaccine  Aged Out    Meningococcal ACWY Vaccine  Aged Out    HPV Vaccine  Aged Out     Assessment/Plan:    Reviewed the rest of his labs which included CMP and CBC which were non-remarkable  His total cholesterol was 175 HDL 34  and triglycerides 80  Given his age and lack of known vascular disease he is not a candidate to be on a statin at this point although by the guidelines hold diabetic should be on a statin  He prefers not to at this point    I reassured him it is okay to use the generic Zyrtec for his allergies      Immunizations are up-to-date new line he will get a flu shot in October    He will get labs before next visit and we will do in a [address] [place] [address] at that time    Hypothyroidism  TSH was 2 519  Continue current dose of levothyroxine    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, without long-term current use of insulin (HCC)    Lab Results   Component Value Date    HGBA1C 5 9 (H) 07/06/2020     A1c is improved from prior 6 3 and 6 4  Microalbumin is negative  Creatinine 1 18 GFR 56    Eye exam is due and he needs a new ophthalmologist so I referred him to Dr Alexsander Abreu has he preferred somebody nearby and a small our office having previously gone to Dr Migel Monge for years and then Dr Nazario Sessions at a larger group which he did not prefer    Foot exam was done today and was non-remarkable    He is on diet control of his sugars and no meds are indicated at this point    He does have mild diabetic neuropathy with numbness in his feet but no loss of sensation to testing  He has been advised to do good foot care which he still performs himself as well as wear protective shoes and inspect his feet regularly  Loss of hearing  He continues to struggle but does have a cochlear implant which allows him to socialize as long as well as speaking directly to him and having eye contact as well as allowing him to hear better and read their lips    Vertigo  Episodic concurrently stable    Balance disorder  When his vertigo occurs his balance becomes more difficult  No falls    Primary osteoarthritis of both knees  He is having pain that improves once he is up in getting around and but still is limiting to his activities to agree  We discussed possibly seeing orthopedist for injection of either steroid and/or Synvisc type material and he said he would prefer to wait at this time we can discuss that again when I see him back  He will call before that if he has worsening problems       Diagnoses and all orders for this visit:    Controlled type 2 diabetes mellitus without complication, without long-term current use of insulin ([facility] )  -     Ambulatory referral to Ophthalmology; Future  -     Hemoglobin A1C; Future  -     Basic metabolic panel; Future    Acquired hypothyroidism  -     TSH, 3rd generation; Future    Mixed conductive and sensorineural hearing loss of both ears    Vertigo    Balance disorder    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, without long-term current use of insulin ([facility])    Primary osteoarthritis of both knees    Primary osteoarthritis of left hip          Subjective:      Patient ID: Farzaneh Solares is a 80 y o  male      He is here today for follow-up on his chronic conditions    He reports that he has been doing okay  He is wife remains socially isolated basically have not gone out even to the store to shot until just this past month  He is begun to play golf occasionally but struggles with this because this now on assure cart with somebody else and hence cost more  He has been good about wearing mask and washing hands  His daughter from [place] wants to come visit but would stay with him for 4 days and they are uncomfortable with this because she and her family are not keeping the same stringent social distancing and hence concern to puts him at risk    He continues to have difficulty with his knees  They are very stiff and sore when he gets up and goes down the stairs in the morning and when he has been sitting for a while and again gets up  Once he is up and moving they are not too bad  He does use Advil on occasion but not regularly    He has allergies and used to use Claritin but his daughter got him some generic Zyrtec which works but he was concerned whether was okay to take his there was a warning on the package about if here older adult vascular doctor 1st    He does have some numbness in his feet along with tingling but has not had any difficulty with sores or calluses and usually does his own nail care  He did bump his foot and there is a small healing injury on the dorsum of his left foot    He has been watching his diet more carefully but still does have a cup tea and small snack of some sort of sweet daily    His wife over retiring the end of August  They are in the process of switching to Medicare but he is having some difficulty demonstrating he has continued insurance coverage past age 72  His wife works at [employer] and they have been insured through that so they are trying to get the records from them  He did sign up for Medicare Part A when he turned 72      The following portions of the patient's history were reviewed and updated as appropriate: allergies, current medications, past family history, past medical history, past social history, past surgical history and problem list     Review of Systems   Constitutional:        See HPI   HENT: Positive for hearing loss (Wears a cog clear implant and device on his left side)  Eyes:        Wears glasses    Last eye exam about 2 years ago and needs a new ophthalmologist   Respiratory: Negative for cough, choking, chest tightness and shortness of breath  Cardiovascular: Negative for chest pain, palpitations and leg swelling  Gastrointestinal:        Bowels are regular    Denies any dyspepsia or GERD   Genitourinary:        No particular problems at this time  He has a history of ED and does have a prescription for medication which he really has not been using but rarely   Musculoskeletal: Positive for arthralgias  See HPI   Neurological: Positive for numbness  Negative for dizziness, light-headedness and headaches  Syncope:  see HPI  Psychiatric/Behavioral: Negative for confusion, decreased concentration, dysphoric mood and sleep disturbance  The patient is not nervous/anxious  Objective:      /52   Pulse 60   Temp 97 5 °F (36 4 °C) (Oral)   Resp 16   Ht 5' 10" (1 778 m)   Wt 68 kg (150 lb)   BMI 21 52 kg/m²          Physical Exam   Constitutional: He is oriented to person, place, and time  He appears well-developed and well-nourished  No distress  HENT:   External device for his cochlear implant above his left ear   Eyes:   Glasses   Neck: Neck supple  No JVD present  No thyromegaly present  Cardiovascular: Normal rate and regular rhythm  Pulses are no weak pulses  No murmur heard  Pulses:       Dorsalis pedis pulses are 2+ on the right side, and 2+ on the left side  Posterior tibial pulses are 2+ on the right side, and 2+ on the left side  No carotid bruits appreciated   Pulmonary/Chest: Effort normal and breath sounds normal    Musculoskeletal: He exhibits no edema  Feet:    Feet:   Right Foot:   Skin Integrity: Negative for ulcer, skin breakdown, erythema, warmth, callus or dry skin  Left Foot:   Skin Integrity: Negative for ulcer, skin breakdown, erythema, warmth, callus or dry skin  Neurological: He is alert and oriented to person, place, and time  Psychiatric: He has a normal mood and affect  His behavior is normal  Judgment and thought content normal    Nursing note and vitals reviewed  Patient's shoes and socks removed  Right Foot/Ankle   Right Foot Inspection  Skin Exam: skin normal and skin intact no dry skin, no warmth, no callus, no erythema, no maceration, no abnormal color, no pre-ulcer, no ulcer and no callus                            Sensory       Monofilament testing: intact  Vascular  Capillary refills: < 3 seconds  The right DP pulse is 2+  The right PT pulse is 2+  Left Foot/Ankle  Left Foot Inspection  Skin Exam: skin normal and skin intactno dry skin, no warmth, no erythema, no maceration, normal color, no pre-ulcer, no ulcer and no callus                                         Sensory       Monofilament: intact  Vascular  Capillary refills: < 3 seconds  The left DP pulse is 2+  The left PT pulse is 2+  Assign Risk Category:  No deformity present; No loss of protective sensation;  No weak pulses       Risk: 0

## 2020-07-13 NOTE — ASSESSMENT & PLAN NOTE
He continues to struggle but does have a cochlear implant which allows him to socialize as long as well as speaking directly to him and having eye contact as well as allowing him to hear better and read their lips

## 2020-07-13 NOTE — ASSESSMENT & PLAN NOTE
He is having pain that improves once he is up in getting around and but still is limiting to his activities to agree  We discussed possibly seeing orthopedist for injection of either steroid and/or Synvisc type material and he said he would prefer to wait at this time we can discuss that again when I see him back    He will call before that if he has worsening problems

## 2020-10-07 ENCOUNTER — IMMUNIZATIONS (OUTPATIENT)
Dept: FAMILY MEDICINE CLINIC | Facility: CLINIC | Age: 85
End: 2020-10-07
Payer: MEDICARE

## 2020-10-07 VITALS — TEMPERATURE: 97 F

## 2020-10-07 DIAGNOSIS — Z23 NEED FOR INFLUENZA VACCINATION: Primary | ICD-10-CM

## 2020-10-07 PROCEDURE — G0008 ADMIN INFLUENZA VIRUS VAC: HCPCS

## 2020-10-07 PROCEDURE — 90662 IIV NO PRSV INCREASED AG IM: CPT

## 2020-10-15 DIAGNOSIS — M25.562 PAIN IN BOTH KNEES, UNSPECIFIED CHRONICITY: Primary | ICD-10-CM

## 2020-10-15 DIAGNOSIS — M25.561 PAIN IN BOTH KNEES, UNSPECIFIED CHRONICITY: Primary | ICD-10-CM

## 2020-10-22 ENCOUNTER — OFFICE VISIT (OUTPATIENT)
Dept: OBGYN CLINIC | Facility: HOSPITAL | Age: 85
End: 2020-10-22
Payer: MEDICARE

## 2020-10-22 ENCOUNTER — HOSPITAL ENCOUNTER (OUTPATIENT)
Dept: RADIOLOGY | Facility: HOSPITAL | Age: 85
Discharge: HOME | End: 2020-10-22
Attending: ORTHOPAEDIC SURGERY
Payer: MEDICARE

## 2020-10-22 VITALS
HEART RATE: 89 BPM | HEIGHT: 70 IN | BODY MASS INDEX: 21.52 KG/M2 | SYSTOLIC BLOOD PRESSURE: 125 MMHG | DIASTOLIC BLOOD PRESSURE: 81 MMHG

## 2020-10-22 DIAGNOSIS — M17.0 PRIMARY OSTEOARTHRITIS OF BOTH KNEES: ICD-10-CM

## 2020-10-22 DIAGNOSIS — M25.561 CHRONIC PAIN OF BOTH KNEES: Primary | ICD-10-CM

## 2020-10-22 DIAGNOSIS — M25.562 CHRONIC PAIN OF BOTH KNEES: Primary | ICD-10-CM

## 2020-10-22 DIAGNOSIS — M25.562 PAIN IN BOTH KNEES, UNSPECIFIED CHRONICITY: ICD-10-CM

## 2020-10-22 DIAGNOSIS — M25.561 PAIN IN BOTH KNEES, UNSPECIFIED CHRONICITY: ICD-10-CM

## 2020-10-22 DIAGNOSIS — G89.29 CHRONIC PAIN OF BOTH KNEES: Primary | ICD-10-CM

## 2020-10-22 PROCEDURE — 20610 DRAIN/INJ JOINT/BURSA W/O US: CPT | Performed by: ORTHOPAEDIC SURGERY

## 2020-10-22 PROCEDURE — 99214 OFFICE O/P EST MOD 30 MIN: CPT | Performed by: ORTHOPAEDIC SURGERY

## 2020-10-22 PROCEDURE — 73562 X-RAY EXAM OF KNEE 3: CPT

## 2020-10-22 RX ORDER — BETAMETHASONE SODIUM PHOSPHATE AND BETAMETHASONE ACETATE 3; 3 MG/ML; MG/ML
12 INJECTION, SUSPENSION INTRA-ARTICULAR; INTRALESIONAL; INTRAMUSCULAR; SOFT TISSUE
Status: COMPLETED | OUTPATIENT
Start: 2020-10-22 | End: 2020-10-22

## 2020-10-22 RX ORDER — LIDOCAINE HYDROCHLORIDE 10 MG/ML
2 INJECTION, SOLUTION INFILTRATION; PERINEURAL
Status: COMPLETED | OUTPATIENT
Start: 2020-10-22 | End: 2020-10-22

## 2020-10-22 RX ORDER — BUPIVACAINE HYDROCHLORIDE 2.5 MG/ML
2 INJECTION, SOLUTION INFILTRATION; PERINEURAL
Status: COMPLETED | OUTPATIENT
Start: 2020-10-22 | End: 2020-10-22

## 2020-10-22 RX ADMIN — BETAMETHASONE SODIUM PHOSPHATE AND BETAMETHASONE ACETATE 12 MG: 3; 3 INJECTION, SUSPENSION INTRA-ARTICULAR; INTRALESIONAL; INTRAMUSCULAR; SOFT TISSUE at 13:37

## 2020-10-22 RX ADMIN — LIDOCAINE HYDROCHLORIDE 2 ML: 10 INJECTION, SOLUTION INFILTRATION; PERINEURAL at 13:37

## 2020-10-22 RX ADMIN — BUPIVACAINE HYDROCHLORIDE 2 ML: 2.5 INJECTION, SOLUTION INFILTRATION; PERINEURAL at 13:37

## 2020-11-12 DIAGNOSIS — E03.9 ACQUIRED HYPOTHYROIDISM: ICD-10-CM

## 2020-11-12 RX ORDER — LEVOTHYROXINE SODIUM 0.07 MG/1
75 TABLET ORAL DAILY
Qty: 90 TABLET | Refills: 3 | Status: SHIPPED | OUTPATIENT
Start: 2020-11-12 | End: 2021-11-11

## 2021-01-28 ENCOUNTER — LAB (OUTPATIENT)
Dept: LAB | Facility: HOSPITAL | Age: 86
End: 2021-01-28
Attending: FAMILY MEDICINE
Payer: MEDICARE

## 2021-01-28 DIAGNOSIS — E11.9 CONTROLLED TYPE 2 DIABETES MELLITUS WITHOUT COMPLICATION, WITHOUT LONG-TERM CURRENT USE OF INSULIN (HCC): ICD-10-CM

## 2021-01-28 DIAGNOSIS — E03.9 ACQUIRED HYPOTHYROIDISM: ICD-10-CM

## 2021-01-28 LAB
ANION GAP SERPL CALCULATED.3IONS-SCNC: 5 MMOL/L (ref 4–13)
BUN SERPL-MCNC: 23 MG/DL (ref 5–25)
CALCIUM SERPL-MCNC: 9.4 MG/DL (ref 8.3–10.1)
CHLORIDE SERPL-SCNC: 101 MMOL/L (ref 100–108)
CO2 SERPL-SCNC: 31 MMOL/L (ref 21–32)
CREAT SERPL-MCNC: 1.02 MG/DL (ref 0.6–1.3)
EST. AVERAGE GLUCOSE BLD GHB EST-MCNC: 131 MG/DL
GFR SERPL CREATININE-BSD FRML MDRD: 67 ML/MIN/1.73SQ M
GLUCOSE P FAST SERPL-MCNC: 136 MG/DL (ref 65–99)
HBA1C MFR BLD: 6.2 %
POTASSIUM SERPL-SCNC: 3.9 MMOL/L (ref 3.5–5.3)
SODIUM SERPL-SCNC: 137 MMOL/L (ref 136–145)
TSH SERPL DL<=0.05 MIU/L-ACNC: 2.68 UIU/ML (ref 0.36–3.74)

## 2021-01-28 PROCEDURE — 83036 HEMOGLOBIN GLYCOSYLATED A1C: CPT

## 2021-01-28 PROCEDURE — 36415 COLL VENOUS BLD VENIPUNCTURE: CPT

## 2021-01-28 PROCEDURE — 80048 BASIC METABOLIC PNL TOTAL CA: CPT

## 2021-01-28 PROCEDURE — 84443 ASSAY THYROID STIM HORMONE: CPT

## 2021-02-11 ENCOUNTER — OFFICE VISIT (OUTPATIENT)
Dept: FAMILY MEDICINE CLINIC | Facility: CLINIC | Age: 86
End: 2021-02-11
Payer: MEDICARE

## 2021-02-11 VITALS
BODY MASS INDEX: 21.47 KG/M2 | RESPIRATION RATE: 16 BRPM | WEIGHT: 150 LBS | SYSTOLIC BLOOD PRESSURE: 104 MMHG | DIASTOLIC BLOOD PRESSURE: 60 MMHG | TEMPERATURE: 97.7 F | HEART RATE: 68 BPM | HEIGHT: 70 IN

## 2021-02-11 DIAGNOSIS — Z13.31 SCREENING FOR DEPRESSION: ICD-10-CM

## 2021-02-11 DIAGNOSIS — G62.9 NEUROPATHY: ICD-10-CM

## 2021-02-11 DIAGNOSIS — R26.89 BALANCE DISORDER: ICD-10-CM

## 2021-02-11 DIAGNOSIS — E03.9 ACQUIRED HYPOTHYROIDISM: ICD-10-CM

## 2021-02-11 DIAGNOSIS — Z13.89 SCREENING FOR OBESITY: ICD-10-CM

## 2021-02-11 DIAGNOSIS — R42 VERTIGO: ICD-10-CM

## 2021-02-11 DIAGNOSIS — M17.0 PRIMARY OSTEOARTHRITIS OF BOTH KNEES: ICD-10-CM

## 2021-02-11 DIAGNOSIS — Z00.00 MEDICARE ANNUAL WELLNESS VISIT, SUBSEQUENT: ICD-10-CM

## 2021-02-11 DIAGNOSIS — E11.40 CONTROLLED TYPE 2 DIABETES MELLITUS WITH DIABETIC NEUROPATHY, WITHOUT LONG-TERM CURRENT USE OF INSULIN (HCC): Primary | ICD-10-CM

## 2021-02-11 DIAGNOSIS — H90.6 MIXED CONDUCTIVE AND SENSORINEURAL HEARING LOSS OF BOTH EARS: ICD-10-CM

## 2021-02-11 PROCEDURE — 1123F ACP DISCUSS/DSCN MKR DOCD: CPT | Performed by: FAMILY MEDICINE

## 2021-02-11 PROCEDURE — 99214 OFFICE O/P EST MOD 30 MIN: CPT | Performed by: FAMILY MEDICINE

## 2021-02-11 PROCEDURE — G0402 INITIAL PREVENTIVE EXAM: HCPCS | Performed by: FAMILY MEDICINE

## 2021-02-11 NOTE — ASSESSMENT & PLAN NOTE
This is related to his vertigo and inner ear dysfunction as well as his osteoarthritis in his knees and age       age  He has had no falls and is careful when ambulating

## 2021-02-11 NOTE — PROGRESS NOTES
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, without long-term current use of insulin ([facility] [address] ) - Primary       Nervous and Auditory    Loss of hearing       Other    Vertigo    Balance disorder      Other Visit Diagnoses     Medicare annual wellness visit, subsequent        Screening for depression        Screening for obesity               Preventive health issues were discussed with patient, and age appropriate screening tests were ordered as noted in patient's After Visit Summary  Personalized health advice and appropriate referrals for health education or preventive services given if needed, as noted in patient's After Visit Summary  History of Present Illness:     Patient presents for Medicare Annual Wellness visit    He continues to live independently with his wife in a bilevel the have 6 stairs up in 6 stairs down  He has no difficulty navigating the stairs or using their facilities  He drives without difficulty  They have a pet dog which she walks twice a day about a mi each time  In the spring fall and summer he likes to play golf  They follow a reasonable diet with lots of fruits and vegetables  His appetite is generally less although the other day when he was shoveling snow he said he was much more hungry  He drinks tea each morning and otherwise does not drink coffee  He does like sweets particularly cake pie and occasional candy  His biggest risk is is significant hearing loss for which he has a cochlear implant          Patient Care Team:  Modesta Nam MD as PCP - General  Carmelina Jhaveri DO     Problem List:     Patient Active Problem List   Diagnosis    Hypothyroidism    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, without long-term current use of insulin (HCC)    Loss of hearing    Primary osteoarthritis of left hip    Primary osteoarthritis of both knees    Male erectile dysfunction, unspecified    Vertigo    Balance disorder      Past Medical and Surgical History:     Past Medical History:   Diagnosis Date    Abnormal loss of weight     last assessed: 4/24/13    Diverticulitis     Diverticulosis      Past Surgical History:   Procedure Laterality Date    APPENDECTOMY      Age 12    CATARACT EXTRACTION      right 5/2002, left 12/2003    COLON SURGERY      for diverticulitis    COLONOSCOPY      done 5/5/2009, mild diverticulosis noted, recheck in 3 years    INNER EAR SURGERY Left     Cochlear device implantation; resolved: Sep 2012    SHOULDER SURGERY  05/2007    right rotator cuff      Family History:     Family History   Problem Relation Age of Onset    Coronary artery disease Sister     Diabetes Sister     Diabetes Daughter       Social History:        Social History     Socioeconomic History    Marital status: /Civil Union     Spouse name: Not on file    Number of children: Not on file    Years of education: Not on file    Highest education level: Not on file   Occupational History    Not on file   Social Needs    Financial resource strain: Not on file    Food insecurity     Worry: Not on file     Inability: Not on file   [employer] needs     Medical: Not on file     Non-medical: Not on file   Tobacco Use    Smoking status: Never Smoker    Smokeless tobacco: Never Used   Substance and Sexual Activity    Alcohol use: No     Comment: social drinker as per [employer]    Drug use: No    Sexual activity: Not on file   Lifestyle    Physical activity     Days per week: Not on file Minutes per session: Not on file    Stress: Not on file   Relationships    Social connections     Talks on phone: Not on file     Gets together: Not on file     Attends Ep[employer] service: Not on file     Active member of club or organization: Not on file     Attends meetings of clubs or organizations: Not on file     Relationship status: Not on file    Intimate partner violence     Fear of current or ex partner: Not on file     Emotionally abused: Not on file     Physically abused: Not on file     Forced sexual activity: Not on file   Other Topics Concern    Not on file   Social History Narrative    Not on file      Medications and Allergies:     Current Outpatient Medications   Medication Sig Dispense Refill    levothyroxine 75 mcg tablet Take 1 tablet (75 mcg total) by mouth daily 90 tablet 3    sildenafil (VIAGRA) 50 MG tablet Take 1 tablet (50 mg total) by mouth as needed for erectile dysfunction 10 tablet 2     No current facility-administered medications for this visit  Allergies   Allergen Reactions    Flagyl [Metronidazole] Other (See Comments)     nausea      Immunizations:     Immunization History   Administered Date(s) Administered    H1N1, All Formulations 01/31/2010    Influenza Split High Dose Preservative Free IM 11/09/2015, 11/10/2016, 09/23/2017    Influenza, high dose seasonal 0 7 mL 10/18/2018, 12/19/2019, 10/07/2020    Influenza, seasonal, injectable 10/02/2012, 10/21/2013, 11/17/2014, 11/18/2014    Pneumococcal Conjugate 13-Valent 12/03/2013, 12/17/2014    Pneumococcal Polysaccharide PPV23 01/01/2005    SARS-CoV-2 / COVID-19 mRNA IM (Moderna) 01/25/2021    Td (adult), adsorbed 09/19/2011    Zoster 11/06/2012    Zoster Vaccine Recombinant 12/10/2018, 06/13/2019      Health Maintenance: There are no preventive care reminders to display for this patient        Topic Date Due    DTaP,Tdap,and Td Vaccines (1 - Tdap) 04/04/1956      Medicare Health Risk Assessment: There were no vitals taken for this visit  Ricky Cifuentes is here for his Subsequent Wellness visit  Last Medicare Wellness visit information reviewed, patient interviewed and updates made to the record today  Health Risk Assessment:   Patient rates overall health as good  Patient feels that their physical health rating is same  Eyesight was rated as same  Hearing was rated as same  Patient feels that their emotional and mental health rating is same  Pain experienced in the last 7 days has been some  Patient's pain rating has been 2/10  Depression Screening:   PHQ-2 Score: 0      Fall Risk Screening: In the past year, patient has experienced: no history of falling in past year      Home Safety:  Patient does not have trouble with stairs inside or outside of their home  Patient has working smoke alarms and has no working carbon monoxide detector  Home safety hazards include: none  Nutrition:   Current diet is Regular  Medications:   Patient is not currently taking any over-the-counter supplements  Patient is able to manage medications  Activities of Daily Living (ADLs)/Instrumental Activities of Daily Living (IADLs):   Walk and transfer into and out of bed and chair?: Yes  Dress and groom yourself?: Yes    Bathe or shower yourself?: Yes    Feed yourself?  Yes  Do your laundry/housekeeping?: Yes  Manage your money, pay your bills and track your expenses?: Yes  Make your own meals?: Yes    Do your own shopping?: Yes    Previous Hospitalizations:   Any hospitalizations or ED visits within the last 12 months?: No      Advance Care Planning:   Living will: Yes    Durable POA for healthcare: No    Advanced directive: Yes    End of Life Decisions reviewed with patient: Yes      Cognitive Screening:   Provider or family/friend/caregiver concerned regarding cognition?: No    PREVENTIVE SCREENINGS      Cardiovascular Screening:    General: Screening Current      Diabetes Screening:     General: Screening Not Indicated and History Diabetes      Colorectal Cancer Screening:     General: Screening Not Indicated      Prostate Cancer Screening:    General: Screening Not Indicated      Lung Cancer Screening:     General: Screening Not Indicated      Brooke Bolton MD

## 2021-02-11 NOTE — PROGRESS NOTES
Chief Complaint   Patient presents with   [facility] Wellness Visit     6 month follow up      Health Maintenance   Topic Date Due   [facility] Annual Wellness Visit (AWV)  1935    DTaP,Tdap,and Td Vaccines (1 - Tdap) 04[birth year]/1956    DM Eye Exam  10/02/2018    COVID-19 Vaccine (2 of 2 - Moderna series) 02/22/2021    URINE MICROALBUMIN  07/06/2021    Diabetic Foot Exam  07/13/2021    HEMOGLOBIN A1C  07/28/2021    Fall Risk  02/11/2022    Depression Screening PHQ  02/11/2022    BMI: Adult  02/11/2022    Pneumococcal Vaccine: 65+ Years  Completed    Influenza Vaccine  Completed    HIB Vaccine  Aged Out    Hepatitis B Vaccine  Aged Out    IPV Vaccine  Aged Out    Hepatitis A Vaccine  Aged Out    Meningococcal ACWY Vaccine  Aged Out    HPV Vaccine  Aged Out     Assessment/Plan:     continue current medications     We did discuss possibly him testing is sugar at home  At this time he is going to wait and think about it and discuss again when he comes back  He is a little concerned he will just focus on the sugar values and create anxiety  I think this is reasonable given his A1c levels over the last several testing  In regard to his lobe blood pressure when I took it again it was 104/60  He does not drink much in the way of fluids and we agreed upon him taking in at least 1 qt a day  He does like Gatorade so 1 of his 4 glasses of water will be that period    Will get labs as ordered before next visit    Hypothyroidism    TSH was 2 68   Continue current dose of levothyroxine    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, without long-term current use of insulin ([facility])    Lab Results   Component Value Date    HGBA1C 6 2 (H) 01/28/2021      A1c was slightly higher than last time when it was 5 9 new line he admits to liking his sweets including cake and pie  We compromise that he would he these 3 or 4 times a week instead of daily      Eye exam is current     Sees podiatrist regularly for foot exam and is scheduled again in March of 2021    Loss of hearing   Cochlear implant is in place with sensor on the left side of his scalp    Primary osteoarthritis of both knees   He started him more trouble again is scheduled to have his knees reinjected  Vertigo    He did not complain of this today but I know he gets periodic episodes    Balance disorder   This is related to his vertigo and inner ear dysfunction as well as his osteoarthritis in his knees and age       age  He has had no falls and is careful when ambulating  Diagnoses and all orders for this visit:    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, without long-term current use of insulin (HCC)  -     CBC; Future  -     Comprehensive metabolic panel; Future  -     Hemoglobin A1C; Future  -     Cancel: Microalbumin / creatinine urine ratio; Future  -     Urinalysis with microscopic; Future  -     Microalbumin / creatinine urine ratio; Future    Neuropathy    Acquired hypothyroidism  -     Comprehensive metabolic panel; Future  -     TSH, 3rd generation; Future    Primary osteoarthritis of both knees    Mixed conductive and sensorineural hearing loss of both ears    Vertigo    Balance disorder    Medicare annual wellness visit, subsequent    Screening for depression    Screening for obesity          Subjective:      Patient ID: Mily Robertson is a 80 y o  male  He is here for follow-up on his chronic conditions     His major complaint is that his legs and knees are stiff in heard  He has had injections in them previously and they helped  He will be getting them again in March about a month after his 2nd COVID shot so that the steroid does not interfere  His blood pressure was low today but he denies any orthostasis   He does not drink much in the way of fluids    He notes that he continues with numbness and tingling in both his feet particular on the plantar surfaces from his heel to his toes    He notes that actually has a little bit of hypersensitivity to stepping on anything  He wears protective shoes at all time and does not walk barefoot  He does see a podiatrist about every 2 months for his nail cares it is difficult for him to do this anymore in the podiatrist does foot exam at each visit  Otherwise he is mostly just bored from being in and not being able to do things at this point   His wife is particularly conscientious about remaining socially distance, masking, and hand washing and he is compliant as well      The following portions of the patient's history were reviewed and updated as appropriate: allergies, current medications, past family history, past medical history, past social history, past surgical history and problem list     Review of Systems   Constitutional:         See HPI   HENT: Positive for hearing loss  Negative for dental problem ( sees dentist every 6 months)  Eyes:         Has eye exam every 1-2 years   Respiratory: Negative for cough, chest tightness and shortness of breath  Cardiovascular: Negative for chest pain, palpitations and leg swelling  Gastrointestinal:         Bowels are regular     Denies any dyspepsia or GERD   Genitourinary:         No concerns at this time   Musculoskeletal: Positive for arthralgias ( see HPI)  Neurological: Negative for dizziness, light-headedness and headaches  Numbness in the plantar surface of both feet and into the toes without loss of sensation   Psychiatric/Behavioral: Negative for confusion, decreased concentration, dysphoric mood and sleep disturbance  The patient is not nervous/anxious            Like most people is getting a little frustrated with COVID and the isolation associated with that that has no persistent dysphoria and exhibits no signs of actual depression         Objective:      BP 98/60   Pulse 68   Temp 97 7 °F (36 5 °C) (Tympanic)   Resp 16   Ht 5' 10" (1 778 m)   Wt 68 kg (150 lb)   BMI 21 52 kg/m²          Physical Exam  Vitals signs and nursing note reviewed  Constitutional:       Appearance: Normal appearance  He is normal weight  Comments:  Is wearing a mask   Eyes:      Comments:  Is wearing glasses   Neck:      Musculoskeletal: Neck supple  Vascular: No carotid bruit  Cardiovascular:      Rate and Rhythm: Normal rate and regular rhythm  Pulses: Normal pulses  Heart sounds: Normal heart sounds  No murmur  Pulmonary:      Effort: Pulmonary effort is normal       Breath sounds: Normal breath sounds  Musculoskeletal:      Right lower leg: No edema  Left lower leg: No edema  Lymphadenopathy:      Cervical: No cervical adenopathy  Neurological:      Mental Status: He is alert and oriented to person, place, and time  Psychiatric:         Mood and Affect: Mood normal          Behavior: Behavior normal          Thought Content:  Thought content normal          Judgment: Judgment normal

## 2021-02-11 NOTE — ASSESSMENT & PLAN NOTE
Lab Results   Component Value Date    HGBA1C 6 2 (H) 01/28/2021      A1c was slightly higher than last time when it was 5 9 new line he admits to liking his sweets including cake and pie  We compromise that he would he these 3 or 4 times a week instead of daily      Eye exam is current     Sees podiatrist regularly for foot exam and is scheduled again in March of 2021

## 2021-02-11 NOTE — PATIENT INSTRUCTIONS
Medicare Preventive Visit Patient Instructions  Thank you for completing your Welcome to Medicare Visit or Medicare Annual Wellness Visit today  Your next wellness visit will be due in one year (2/11/2022)  The screening/preventive services that you may require over the next 5-10 years are detailed below  Some tests may not apply to you based off risk factors and/or age  Screening tests ordered at today's visit but not completed yet may show as past due  Also, please note that scanned in results may not display below  Preventive Screenings:  Service Recommendations Previous Testing/Comments   Colorectal Cancer Screening  · Colonoscopy    · Fecal Occult Blood Test (FOBT)/Fecal Immunochemical Test (FIT)  · Fecal DNA/Cologuard Test  · Flexible Sigmoidoscopy Age: 54-65 years old   Colonoscopy: every 10 years (May be performed more frequently if at higher risk)  OR  FOBT/FIT: every 1 year  OR  Cologuard: every 3 years  OR  Sigmoidoscopy: every 5 years  Screening may be recommended earlier than age 48 if at higher risk for colorectal cancer  Also, an individualized decision between you and your healthcare provider will decide whether screening between the ages of 74-80 would be appropriate  Colonoscopy: Not on file  FOBT/FIT: Not on file  Cologuard: Not on file  Sigmoidoscopy: Not on file         Prostate Cancer Screening Individualized decision between patient and health care provider in men between ages of 53-78   Medicare will cover every 12 months beginning on the day after your 50th birthday PSA: No results in last 5 years          Hepatitis C Screening Once for adults born between [facility]  More frequently in patients at high risk for Hepatitis C Hep C Antibody: Not on file       Diabetes Screening 1-2 times per year if you're at risk for diabetes or have pre-diabetes Fasting glucose: 136 mg/dL   A1C: 6 2 %       Cholesterol Screening Once every 5 years if you don't have a lipid disorder   May order more often based on risk factors  Lipid panel: 07/06/2020          Other Preventive Screenings Covered by Medicare:  1  Abdominal Aortic Aneurysm (AAA) Screening: covered once if your at risk  You're considered to be at risk if you have a family history of AAA or a male between the age of 73-68 who smoking at least 100 cigarettes in your lifetime  2  Lung Cancer Screening: covers low dose CT scan once per year if you meet all of the following conditions: (1) Age 50-69; (2) No signs or symptoms of lung cancer; (3) Current smoker or have quit smoking within the last 15 years; (4) You have a tobacco smoking history of at least 30 pack years (packs per day x number of years you smoked); (5) You get a written order from a healthcare provider  3  Glaucoma Screening: covered annually if you're considered high risk: (1) You have diabetes OR (2) Family history of glaucoma OR (3)  aged 48 and older OR (3)  American aged 72 and older  3  Osteoporosis Screening: covered every 2 years if you meet one of the following conditions: (1) Have a vertebral abnormality; (2) On glucocorticoid therapy for more than 3 months; (3) Have primary hyperparathyroidism; (4) On osteoporosis medications and need to assess response to drug therapy  5  HIV Screening: covered annually if you're between the age of 12-76  Also covered annually if you are younger than 13 and older than 72 with risk factors for HIV infection  For pregnant patients, it is covered up to 3 times per pregnancy      Immunizations:  Immunization Recommendations   Influenza Vaccine Annual influenza vaccination during flu season is recommended for all persons aged >= 6 months who do not have contraindications   Pneumococcal Vaccine (Prevnar and Pneumovax)  * Prevnar = PCV13  * Pneumovax = PPSV23 Adults 25-60 years old: 1-3 doses may be recommended based on certain risk factors  Adults 72 years old: Prevnar (PCV13) vaccine recommended followed by Pneumovax (PPSV23) vaccine  If already received PPSV23 since turning 65, then PCV13 recommended at least one year after PPSV23 dose  Hepatitis B Vaccine 3 dose series if at intermediate or high risk (ex: diabetes, end stage renal disease, liver disease)   Tetanus (Td) Vaccine - COST NOT COVERED BY MEDICARE PART B Following completion of primary series, a booster dose should be given every 10 years to maintain immunity against tetanus  Td may also be given as tetanus wound prophylaxis  Tdap Vaccine - COST NOT COVERED BY MEDICARE PART B Recommended at least once for all adults  For pregnant patients, recommended with each pregnancy  Shingles Vaccine (Shingrix) - COST NOT COVERED BY MEDICARE PART B  2 shot series recommended in those aged 48 and above     Health Maintenance Due:  There are no preventive care reminders to display for this patient  Immunizations Due:      Topic Date Due    DTaP,Tdap,and Td Vaccines (1 - Tdap) 04/04/1956     Advance Directives   What are advance directives? Advance directives are legal documents that state your wishes and plans for medical care  These plans are made ahead of time in case you lose your ability to make decisions for yourself  Advance directives can apply to any medical decision, such as the treatments you want, and if you want to donate organs  What are the types of advance directives? There are many types of advance directives, and each state has rules about how to use them  You may choose a combination of any of the following:  · Living will: This is a written record of the treatment you want  You can also choose which treatments you do not want, which to limit, and which to stop at a certain time  This includes surgery, medicine, IV fluid, and tube feedings  · Durable power of  for healthcare [facility]): This is a written record that states who you want to make healthcare choices for you when you are unable to make them for yourself   This person, called reyes proxy, is usually a family member or a friend  You may choose more than 1 proxy  · Do not resuscitate (DNR) order:  A DNR order is used in case your heart stops beating or you stop breathing  It is a request not to have certain forms of treatment, such as CPR  A DNR order may be included in other types of advance directives  · Medical directive: This covers the care that you want if you are in a coma, near death, or unable to make decisions for yourself  You can list the treatments you want for each condition  Treatment may include pain medicine, surgery, blood transfusions, dialysis, IV or tube feedings, and a ventilator (breathing machine)  · Values history: This document has questions about your views, beliefs, and how you feel and think about life  This information can help others choose the care that you would choose  Why are advance directives important? An advance directive helps you control your care  Although spoken wishes may be used, it is better to have your wishes written down  Spoken wishes can be misunderstood, or not followed  Treatments may be given even if you do not want them  An advance directive may make it easier for your family to make difficult choices about your care  © Copyright [employer] 2018 Information is for End User's use only and may not be sold, redistributed or otherwise used for commercial purposes   All illustrations and images included in CareNotes® are the copyrighted property of [employer]  or [address] [place] [address] [employer]

## 2021-03-24 ENCOUNTER — OFFICE VISIT (OUTPATIENT)
Dept: OBGYN CLINIC | Facility: HOSPITAL | Age: 86
End: 2021-03-24
Payer: MEDICARE

## 2021-03-24 VITALS
SYSTOLIC BLOOD PRESSURE: 123 MMHG | BODY MASS INDEX: 21.33 KG/M2 | HEART RATE: 79 BPM | DIASTOLIC BLOOD PRESSURE: 66 MMHG | HEIGHT: 70 IN | WEIGHT: 149 LBS

## 2021-03-24 DIAGNOSIS — M17.0 PRIMARY OSTEOARTHRITIS OF BOTH KNEES: Primary | ICD-10-CM

## 2021-03-24 PROCEDURE — 20610 DRAIN/INJ JOINT/BURSA W/O US: CPT | Performed by: PHYSICIAN ASSISTANT

## 2021-03-24 NOTE — PROGRESS NOTES
Subjective;   26-year-old male patient with osteoarthritis of both knees  He is been approved for Synvisc-One therapy for both knees  He presents to the office today to receive the injections    Past Medical History:   Diagnosis Date    Abnormal loss of weight     last assessed: 4/24/13    Diverticulitis     Diverticulosis        Past Surgical History:   Procedure Laterality Date    APPENDECTOMY      Age 12    CATARACT EXTRACTION      right 5/2002, left 12/2003    COLON SURGERY      for diverticulitis    COLONOSCOPY      done 5/5/2009, mild diverticulosis noted, recheck in 3 years    INNER EAR SURGERY Left     Cochlear device implantation; resolved: Sep 2012    SHOULDER SURGERY  05/2007    right rotator cuff       Family History   Problem Relation Age of Onset    Coronary artery disease Sister     Diabetes Sister     Diabetes Daughter        Social History     Tobacco Use    Smoking status: Never Smoker    Smokeless tobacco: Never Used   Substance Use Topics    Alcohol use: No     Comment: social drinker as per [employer]    Drug use: No      exam;     well-developed well-nourished adult male who appears his stated age  He has no discoloration of the knee or distension of the knee that would preclude safe administration of either the right or the left knee at today's appointment  Large joint arthrocentesis: R knee  Universal Protocol:  Consent: Verbal consent obtained  Time out: Immediately prior to procedure a "time out" was called to verify the correct patient, procedure, equipment, support staff and site/side marked as required    Procedure Details  Location: knee - R knee (RP/CT  Medial)  Medications administered: 48 mg hylan 48 MG/6ML    Patient tolerance: patient tolerated the procedure well with no immediate complications  Dressing:  Sterile dressing applied    Large joint arthrocentesis: L knee  Procedure Details  Location: knee - L knee (RP/CT Lateral)  Medications administered: 48 mg hylan 48 MG/6ML    Patient tolerance: patient tolerated the procedure well with no immediate complications  Dressing:  Sterile dressing applied         impression;     bilateral knee osteoarthritis   bilateral knee pain     plan;     he underwent injection of Synvisc-One to both knees   we can see him in follow-up in 3 months   he also acknowledges that he cannot receive [employer] supplement products again for 6 months ,   his entire experience was supervised by and plan formulated by the attending surgeon it was my privilege to assist him in its delivery

## 2021-06-24 ENCOUNTER — OFFICE VISIT (OUTPATIENT)
Dept: OBGYN CLINIC | Facility: HOSPITAL | Age: 86
End: 2021-06-24
Payer: MEDICARE

## 2021-06-24 VITALS
DIASTOLIC BLOOD PRESSURE: 74 MMHG | WEIGHT: 148 LBS | HEART RATE: 90 BPM | SYSTOLIC BLOOD PRESSURE: 120 MMHG | HEIGHT: 70 IN | BODY MASS INDEX: 21.19 KG/M2

## 2021-06-24 DIAGNOSIS — M25.562 CHRONIC PAIN OF BOTH KNEES: ICD-10-CM

## 2021-06-24 DIAGNOSIS — M17.0 PRIMARY OSTEOARTHRITIS OF BOTH KNEES: Primary | ICD-10-CM

## 2021-06-24 DIAGNOSIS — M25.561 CHRONIC PAIN OF BOTH KNEES: ICD-10-CM

## 2021-06-24 DIAGNOSIS — G89.29 CHRONIC PAIN OF BOTH KNEES: ICD-10-CM

## 2021-06-24 PROCEDURE — 20610 DRAIN/INJ JOINT/BURSA W/O US: CPT | Performed by: ORTHOPAEDIC SURGERY

## 2021-06-24 PROCEDURE — 99213 OFFICE O/P EST LOW 20 MIN: CPT | Performed by: ORTHOPAEDIC SURGERY

## 2021-06-24 RX ORDER — BUPIVACAINE HYDROCHLORIDE 2.5 MG/ML
2 INJECTION, SOLUTION INFILTRATION; PERINEURAL
Status: COMPLETED | OUTPATIENT
Start: 2021-06-24 | End: 2021-06-24

## 2021-06-24 RX ORDER — BETAMETHASONE SODIUM PHOSPHATE AND BETAMETHASONE ACETATE 3; 3 MG/ML; MG/ML
12 INJECTION, SUSPENSION INTRA-ARTICULAR; INTRALESIONAL; INTRAMUSCULAR; SOFT TISSUE
Status: COMPLETED | OUTPATIENT
Start: 2021-06-24 | End: 2021-06-24

## 2021-06-24 RX ORDER — LIDOCAINE HYDROCHLORIDE 10 MG/ML
2 INJECTION, SOLUTION INFILTRATION; PERINEURAL
Status: COMPLETED | OUTPATIENT
Start: 2021-06-24 | End: 2021-06-24

## 2021-06-24 RX ADMIN — BUPIVACAINE HYDROCHLORIDE 2 ML: 2.5 INJECTION, SOLUTION INFILTRATION; PERINEURAL at 13:40

## 2021-06-24 RX ADMIN — LIDOCAINE HYDROCHLORIDE 2 ML: 10 INJECTION, SOLUTION INFILTRATION; PERINEURAL at 13:40

## 2021-06-24 RX ADMIN — BETAMETHASONE SODIUM PHOSPHATE AND BETAMETHASONE ACETATE 12 MG: 3; 3 INJECTION, SUSPENSION INTRA-ARTICULAR; INTRALESIONAL; INTRAMUSCULAR; SOFT TISSUE at 13:40

## 2021-06-24 NOTE — PROGRESS NOTES
Assessment:   Diagnosis ICD-10-CM Associated Orders   1  Primary osteoarthritis of both knees  M17 0 Large joint arthrocentesis: bilateral knee   2  Chronic pain of both knees  M25 561 Large joint arthrocentesis: bilateral knee    M25 562     G89 29        Plan:  Diagnosis, treatment options and associated risks were discussed with the patient including no treatment, nonsurgical treatment and potential for surgical intervention  The patient was given the opportunity to ask questions regarding each  Patient was offered, accepted, performed injection of cortisone to both knees for symptomatic relief  Patient tolerated both injections well  Ice and post injection protocol advised  Weightbearing activities as tolerated  To do next visit:  Return in about 3 months (around 9/24/2021) for re-check  The above stated was discussed in layman's terms and the patient expressed understanding  All questions were answered to the patient's satisfaction  Scribe Attestation    I,:  Gurwinder Winter am acting as a scribe while in the presence of the attending physician :       I,:  Ru Chan MD personally performed the services described in this documentation    as scribed in my presence :             Subjective: Mona Oswald is a 80 y o  male who presents today for repeat evaluation of his bilateral knees due to return of pain  He has known osteoarthritis  At his visit 3 months ago he had Synvisc-One injections to both knees without any appreciable relief compared to previous cortisone injections  He has stiffness getting up from prolonged sedentary positions  At times he has difficulty ambulating stairs  He has increased knee pain with weight-bearing activities  He wishes to continue with injection therapy with cortisone injections         Review of systems negative unless otherwise specified in HPI  Review of Systems    Past Medical History:   Diagnosis Date    Abnormal loss of weight     last assessed: 4/24/13    Diverticulitis     Diverticulosis        Past Surgical History:   Procedure Laterality Date    APPENDECTOMY      Age 12    CATARACT EXTRACTION      right 5/2002, left 12/2003    COLON SURGERY      for diverticulitis    COLONOSCOPY      done 5/5/2009, mild diverticulosis noted, recheck in 3 years    INNER EAR SURGERY Left     Cochlear device implantation; resolved: Sep 2012    SHOULDER SURGERY  05/2007    right rotator cuff       Family History   Problem Relation Age of Onset    Coronary artery disease Sister     Diabetes Sister     Diabetes Daughter        Social History     Occupational History    Not on file   Tobacco Use    Smoking status: Never Smoker    Smokeless tobacco: Never Used   Substance and Sexual Activity    Alcohol use: No     Comment: social drinker as per [employer]    Drug use: No    Sexual activity: Not on file         Current Outpatient Medications:     levothyroxine 75 mcg tablet, Take 1 tablet (75 mcg total) by mouth daily, Disp: 90 tablet, Rfl: 3    sildenafil (VIAGRA) 50 MG tablet, Take 1 tablet (50 mg total) by mouth as needed for erectile dysfunction, Disp: 10 tablet, Rfl: 2    Allergies   Allergen Reactions    Flagyl [Metronidazole] Other (See Comments)     nausea            Vitals:    06/24/21 1311   BP: 120/74   Pulse: 90       Objective:                    Right Knee Exam     Muscle Strength   The patient has normal right knee strength  Tenderness   The patient is experiencing tenderness in the medial joint line  Range of Motion   The patient has normal right knee ROM  Right knee flexion: With crepitation stiffness  Other   Erythema: absent  Sensation: normal  Swelling: none  Effusion: no effusion present      Left Knee Exam     Muscle Strength   The patient has normal left knee strength  Tenderness   The patient is experiencing tenderness in the medial joint line  Range of Motion   The patient has normal left knee ROM    Left knee flexion: With crepitation stiffness  Other   Erythema: absent  Sensation: normal  Swelling: none  Effusion: no effusion present    Comments: Both knees remain in mild varus alignment bony enlargement medially  Diagnostics, reviewed and taken today if performed as documented:    None performed          Procedures, if performed today:    Large joint arthrocentesis: bilateral knee  Universal Protocol:  Consent: Verbal consent obtained  Risks and benefits: risks, benefits and alternatives were discussed  Consent given by: patient  Time out: Immediately prior to procedure a "time out" was called to verify the correct patient, procedure, equipment, support staff and site/side marked as required  Timeout called at: 6/24/2021 1:40 PM   Patient understanding: patient states understanding of the procedure being performed  Site marked: the operative site was marked  Patient identity confirmed: verbally with patient    Supporting Documentation  Indications: pain and diagnostic evaluation   Procedure Details  Location: knee - bilateral knee  Preparation: Patient was prepped and draped in the usual sterile fashion  Needle size: 22 G  Ultrasound guidance: no  Approach: anterolateral    Medications (Right): 2 mL bupivacaine 0 25 %; 2 mL lidocaine 1 %; 12 mg betamethasone acetate-betamethasone sodium phosphate 6 (3-3) mg/mLMedications (Left): 2 mL bupivacaine 0 25 %; 2 mL lidocaine 1 %; 12 mg betamethasone acetate-betamethasone sodium phosphate 6 (3-3) mg/mL   Patient tolerance: patient tolerated the procedure well with no immediate complications  Dressing:  Sterile dressing applied                Portions of the record may have been created with voice recognition software  Occasional wrong word or "sound a like" substitutions may have occurred due to the inherent limitations of voice recognition software  Read the chart carefully and recognize, using context, where substitutions have occurred

## 2021-08-12 ENCOUNTER — OFFICE VISIT (OUTPATIENT)
Dept: FAMILY MEDICINE CLINIC | Facility: CLINIC | Age: 86
End: 2021-08-12
Payer: MEDICARE

## 2021-08-12 VITALS
WEIGHT: 149 LBS | OXYGEN SATURATION: 97 % | TEMPERATURE: 98 F | RESPIRATION RATE: 14 BRPM | SYSTOLIC BLOOD PRESSURE: 100 MMHG | DIASTOLIC BLOOD PRESSURE: 64 MMHG | HEART RATE: 90 BPM | BODY MASS INDEX: 21.33 KG/M2 | HEIGHT: 70 IN

## 2021-08-12 DIAGNOSIS — E03.9 ACQUIRED HYPOTHYROIDISM: ICD-10-CM

## 2021-08-12 DIAGNOSIS — H90.6 MIXED CONDUCTIVE AND SENSORINEURAL HEARING LOSS OF BOTH EARS: ICD-10-CM

## 2021-08-12 DIAGNOSIS — E11.40 CONTROLLED TYPE 2 DIABETES MELLITUS WITH DIABETIC NEUROPATHY, WITHOUT LONG-TERM CURRENT USE OF INSULIN (HCC): Primary | ICD-10-CM

## 2021-08-12 DIAGNOSIS — R26.89 BALANCE DISORDER: ICD-10-CM

## 2021-08-12 DIAGNOSIS — R42 VERTIGO: ICD-10-CM

## 2021-08-12 DIAGNOSIS — J34.89 RHINORRHEA: ICD-10-CM

## 2021-08-12 DIAGNOSIS — M17.0 PRIMARY OSTEOARTHRITIS OF BOTH KNEES: ICD-10-CM

## 2021-08-12 PROCEDURE — 99214 OFFICE O/P EST MOD 30 MIN: CPT | Performed by: FAMILY MEDICINE

## 2021-08-12 RX ORDER — IPRATROPIUM BROMIDE 21 UG/1
2 SPRAY, METERED NASAL 3 TIMES DAILY PRN
Qty: 30 ML | Refills: 5 | Status: SHIPPED | OUTPATIENT
Start: 2021-08-12 | End: 2022-03-10 | Stop reason: ALTCHOICE

## 2021-08-12 NOTE — PROGRESS NOTES
Chief Complaint   Patient presents with    Follow-up     6 month follow up     Health Maintenance   Topic Date Due    DTaP,Tdap,and Td Vaccines (1 - Tdap) 04/04/1956    DM Eye Exam  10/02/2018    URINE MICROALBUMIN  07/06/2021    Diabetic Foot Exam  07/13/2021    HEMOGLOBIN A1C  07/28/2021    Influenza Vaccine (1) 09/01/2021    Fall Risk  02/11/2022    Medicare Annual Wellness Visit (AWV)  02/11/2022    Depression Screening PHQ  08/12/2022    BMI: Adult  08/12/2022    Pneumococcal Vaccine: 65+ Years  Completed    COVID-19 Vaccine  Completed    HIB Vaccine  Aged Out    Hepatitis B Vaccine  Aged Out    IPV Vaccine  Aged Out    Hepatitis A Vaccine  Aged Out    Meningococcal ACWY Vaccine  Aged Out    HPV Vaccine  Aged Out             Assessment/Plan:     immunizations are up-to-date    He did have a Tdap done [facility] earlier this year     in regard to his generalized weakness and deconditioning I have suggested that he consider going to physical therapy for 4-8 weeks and then transition to a ongoing exercise program   He will consider this and let me now if he would like a referral     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, without long-term current use of insulin ([facility] [address] )    Lab Results   Component Value Date    HGBA1C 6 2 (H) 01/28/2021      he continues to be controlled with diet    Will get labs as previously ordered    Has eye exam scheduled later this fall    Foot exam done today    Hypothyroidism   Continue on current dose of levothyroxine    Will get labs as ordered    Loss of hearing   Has cochlear implant  Has found masking to be difficult for him as he no longer can see people's lips    Primary osteoarthritis of both knees   He did not complain about this today but has been a chronic problem    Vertigo   This continues to be an intermittent issue for him he has been on certainly is affected by    Balance disorder   Has difficulty with his balance but has not had any falls       Diagnoses and all orders for this visit:    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, without long-term current use of insulin (HCC)    Acquired hypothyroidism    Primary osteoarthritis of both knees    Mixed conductive and sensorineural hearing loss of both ears    Vertigo    Balance disorder    Rhinorrhea  -     ipratropium (ATROVENT) 0 03 % nasal spray; 2 sprays into each nostril 3 (three) times a day as needed for rhinitis    Other orders  -     Cancel: Ambulatory Referral to Ophthalmology; Future          Subjective:      Patient ID: Aniket Mills is a 80 y o  male  He is here today for follow-up on his chronic conditions    He reports that he is pretty much stable but does have some issues that bother him    He does get some exertional dyspnea if he is more strenuous in his activity such as walking up this the pill  He is able to walk his dog 1 mlei twice a day without any difficulties and no shortness of breath at those times       he reports that he sneezes and coughs each morning for about an hour  The cough is dry nonproductive any may have a little bit of postnasal drip   He has a history of allergies as an adult and finds it recently Zyrtec or Claritin nor Allegra seemed to help   when he was having his allergies they tend to be more seasonal in the summer and fall and he relates them to grass and then the things such as rag weed  He mostly has a runny nose seems to be a lot in the morning but also can be random  It is clear and thin  He has no history of asthma and denies any wheezing    Final leg cramps once or twice a week the wake him up and he needs to get up and walk to help them go away    He still plays golf with a group of fell as to where his age    He complains that he has generalized decreased strength and it does impact his ability to do things    He does follow a reasonable diet   He continues to drive without incident     His wife is scheduled for a cardiac ablation at [facility] soon      The following portions of the patient's history were reviewed and updated as appropriate: allergies, current medications, past family history, past medical history, past social history, past surgical history and problem list     Review of Systems   Constitutional:        See HPI   HENT: Positive for hearing loss ( he has a cochlear implant and does have difficulty hearing and finds the masks to be an impediment because he can then see people's lips)  Eyes: Will be seeing Dr Rupinder Lan in November   Respiratory: Positive for cough and shortness of breath  Negative for choking and chest tightness  See HPI   Cardiovascular: Negative for chest pain, palpitations and leg swelling  Gastrointestinal:        Denies any dyspepsia or GERD     Bowel movements are regular   Musculoskeletal:        Other than the general decreased strength and deconditioning he does not complain of any particular chronic musculoskeletal issues aside from the nocturnal leg cramps   Skin:        Sees Dermatology   Neurological: Positive for dizziness ( he does get some vertigo or motion feeling and definitely impacts his balance)  Negative for light-headedness and headaches  Psychiatric/Behavioral: Negative for confusion, decreased concentration, dysphoric mood and sleep disturbance  The patient is not nervous/anxious  Objective:      /64 (BP Location: Left arm, Patient Position: Sitting, Cuff Size: Adult)   Pulse 90   Temp 98 °F (36 7 °C) (Tympanic)   Resp 14   Ht 5' 10" (1 778 m)   Wt 67 6 kg (149 lb)   SpO2 97%   BMI 21 38 kg/m²          Physical Exam  Vitals and nursing note reviewed  Constitutional:       Appearance: Normal appearance  He is normal weight     HENT:      Ears:      Comments: He has the device for his cochlear implant by his left ear     Nose:      Comments: He has some clear rhinorrhea without significant inflammation or bogginess his turbinates     Mouth/Throat:      Comments: No postnasal drip identified  Eyes:      Comments: He is wearing glasses   Neck:      Vascular: No carotid bruit  Cardiovascular:      Rate and Rhythm: Normal rate and regular rhythm  Pulses: no weak pulses          Dorsalis pedis pulses are 1+ on the right side and 1+ on the left side  Posterior tibial pulses are 1+ on the right side and 1+ on the left side  Heart sounds: No murmur heard  Pulmonary:      Effort: Pulmonary effort is normal       Breath sounds: Normal breath sounds  Musculoskeletal:      Right lower leg: No edema  Left lower leg: No edema  Feet:    Feet:      Right foot:      Skin integrity: No ulcer, skin breakdown, erythema, warmth, callus or dry skin  Left foot:      Skin integrity: No ulcer, skin breakdown, erythema, warmth, callus or dry skin  Lymphadenopathy:      Cervical: No cervical adenopathy  Neurological:      Mental Status: He is alert and oriented to person, place, and time  Psychiatric:         Mood and Affect: Mood normal          Behavior: Behavior normal          Thought Content: Thought content normal          Judgment: Judgment normal        Patient's shoes and socks removed  Right Foot/Ankle   Right Foot Inspection  Skin Exam: skin normal and skin intact no dry skin, no warmth, no callus, no erythema, no maceration, no abnormal color, no pre-ulcer, no ulcer and no callus                            Sensory       Monofilament testing: diminished  Vascular  Capillary refills: < 3 seconds  The right DP pulse is 1+  The right PT pulse is 1+  Left Foot/Ankle  Left Foot Inspection  Skin Exam: skin normal and skin intactno dry skin, no warmth, no erythema, no maceration, normal color, no pre-ulcer, no ulcer and no callus                                         Sensory       Monofilament: diminished  Vascular  Capillary refills: < 3 seconds  The left DP pulse is 1+   The left PT pulse is 1+  Assign Risk Category:  No deformity present; Loss of protective sensation;  No weak pulses       Risk: 2

## 2021-08-13 NOTE — ASSESSMENT & PLAN NOTE
Lab Results   Component Value Date    HGBA1C 6 2 (H) 01/28/2021      he continues to be controlled with diet    Will get labs as previously ordered    Has eye exam scheduled later this fall    Foot exam done today

## 2021-08-13 NOTE — ASSESSMENT & PLAN NOTE
Has cochlear implant  Has found masking to be difficult for him as he no longer can see people's lips

## 2021-08-19 ENCOUNTER — APPOINTMENT (OUTPATIENT)
Dept: LAB | Facility: HOSPITAL | Age: 86
End: 2021-08-19
Attending: FAMILY MEDICINE
Payer: MEDICARE

## 2021-08-19 DIAGNOSIS — E11.40 CONTROLLED TYPE 2 DIABETES MELLITUS WITH DIABETIC NEUROPATHY, WITHOUT LONG-TERM CURRENT USE OF INSULIN (HCC): ICD-10-CM

## 2021-08-19 DIAGNOSIS — E03.9 ACQUIRED HYPOTHYROIDISM: ICD-10-CM

## 2021-08-19 LAB
ALBUMIN SERPL BCP-MCNC: 3.6 G/DL (ref 3.5–5)
ALP SERPL-CCNC: 49 U/L (ref 46–116)
ALT SERPL W P-5'-P-CCNC: 25 U/L (ref 12–78)
ANION GAP SERPL CALCULATED.3IONS-SCNC: 6 MMOL/L (ref 4–13)
AST SERPL W P-5'-P-CCNC: 18 U/L (ref 5–45)
BACTERIA UR QL AUTO: NORMAL /HPF
BILIRUB SERPL-MCNC: 0.83 MG/DL (ref 0.2–1)
BILIRUB UR QL STRIP: NEGATIVE
BUN SERPL-MCNC: 23 MG/DL (ref 5–25)
CALCIUM SERPL-MCNC: 8.7 MG/DL (ref 8.3–10.1)
CHLORIDE SERPL-SCNC: 105 MMOL/L (ref 100–108)
CLARITY UR: CLEAR
CO2 SERPL-SCNC: 31 MMOL/L (ref 21–32)
COLOR UR: COLORLESS
CREAT SERPL-MCNC: 1.08 MG/DL (ref 0.6–1.3)
CREAT UR-MCNC: 73.5 MG/DL
ERYTHROCYTE [DISTWIDTH] IN BLOOD BY AUTOMATED COUNT: 12.4 % (ref 11.6–15.1)
EST. AVERAGE GLUCOSE BLD GHB EST-MCNC: 134 MG/DL
GFR SERPL CREATININE-BSD FRML MDRD: 62 ML/MIN/1.73SQ M
GLUCOSE P FAST SERPL-MCNC: 145 MG/DL (ref 65–99)
GLUCOSE UR STRIP-MCNC: NEGATIVE MG/DL
HBA1C MFR BLD: 6.3 %
HCT VFR BLD AUTO: 42.3 % (ref 36.5–49.3)
HGB BLD-MCNC: 14.1 G/DL (ref 12–17)
HGB UR QL STRIP.AUTO: NEGATIVE
KETONES UR STRIP-MCNC: NEGATIVE MG/DL
LEUKOCYTE ESTERASE UR QL STRIP: NEGATIVE
MCH RBC QN AUTO: 30.2 PG (ref 26.8–34.3)
MCHC RBC AUTO-ENTMCNC: 33.3 G/DL (ref 31.4–37.4)
MCV RBC AUTO: 91 FL (ref 82–98)
MICROALBUMIN UR-MCNC: <5 MG/L (ref 0–20)
MICROALBUMIN/CREAT 24H UR: <7 MG/G CREATININE (ref 0–30)
NITRITE UR QL STRIP: NEGATIVE
NON-SQ EPI CELLS URNS QL MICRO: NORMAL /HPF
PH UR STRIP.AUTO: 6.5 [PH]
PLATELET # BLD AUTO: 191 THOUSANDS/UL (ref 149–390)
PMV BLD AUTO: 9.8 FL (ref 8.9–12.7)
POTASSIUM SERPL-SCNC: 4.4 MMOL/L (ref 3.5–5.3)
PROT SERPL-MCNC: 6.8 G/DL (ref 6.4–8.2)
PROT UR STRIP-MCNC: NEGATIVE MG/DL
RBC # BLD AUTO: 4.67 MILLION/UL (ref 3.88–5.62)
RBC #/AREA URNS AUTO: NORMAL /HPF
SODIUM SERPL-SCNC: 142 MMOL/L (ref 136–145)
SP GR UR STRIP.AUTO: 1.01 (ref 1–1.03)
TSH SERPL DL<=0.05 MIU/L-ACNC: 2.26 UIU/ML (ref 0.36–3.74)
UROBILINOGEN UR QL STRIP.AUTO: 0.2 E.U./DL
WBC # BLD AUTO: 8.04 THOUSAND/UL (ref 4.31–10.16)
WBC #/AREA URNS AUTO: NORMAL /HPF

## 2021-08-19 PROCEDURE — 36415 COLL VENOUS BLD VENIPUNCTURE: CPT

## 2021-08-19 PROCEDURE — 85027 COMPLETE CBC AUTOMATED: CPT

## 2021-08-19 PROCEDURE — 83036 HEMOGLOBIN GLYCOSYLATED A1C: CPT

## 2021-08-19 PROCEDURE — 80053 COMPREHEN METABOLIC PANEL: CPT

## 2021-08-19 PROCEDURE — 82043 UR ALBUMIN QUANTITATIVE: CPT

## 2021-08-19 PROCEDURE — 84443 ASSAY THYROID STIM HORMONE: CPT

## 2021-08-19 PROCEDURE — 81001 URINALYSIS AUTO W/SCOPE: CPT

## 2021-08-19 PROCEDURE — 82570 ASSAY OF URINE CREATININE: CPT

## 2021-08-30 ENCOUNTER — TELEPHONE (OUTPATIENT)
Dept: ADMINISTRATIVE | Facility: OTHER | Age: 86
End: 2021-08-30

## 2021-08-30 NOTE — TELEPHONE ENCOUNTER
----- Message from Bret Mcdermott, [address] [place] sent at 8/27/2021 11:06 AM EDT -----  Regarding: care gap request, DM FOOT EXAM  08/27/21 11:06 AM    Hello, our patient attached above has had Diabetic Foot Exam completed/performed  Please assist in updating the patient chart by making an External outreach to   [facility] facility located on [address] [place] [address], Phone # [phone]   The date of service is unknown      Thank you,  Bret Mcdermott MA  [facility]

## 2021-08-30 NOTE — TELEPHONE ENCOUNTER
Upon review of the In Basket request we have found that the patient has aged out of the measure and/or the document date is outside of the measure timeframe  Any additional questions or concerns should be emailed to the Practice Liaisons via [email or web address] email, please do not reply via In Basket      Thank you  Ashlie Fan MA

## 2021-09-23 ENCOUNTER — OFFICE VISIT (OUTPATIENT)
Dept: OBGYN CLINIC | Facility: HOSPITAL | Age: 86
End: 2021-09-23
Payer: MEDICARE

## 2021-09-23 VITALS
WEIGHT: 145 LBS | SYSTOLIC BLOOD PRESSURE: 108 MMHG | BODY MASS INDEX: 20.76 KG/M2 | DIASTOLIC BLOOD PRESSURE: 68 MMHG | HEART RATE: 87 BPM | HEIGHT: 70 IN

## 2021-09-23 DIAGNOSIS — M25.561 CHRONIC PAIN OF BOTH KNEES: ICD-10-CM

## 2021-09-23 DIAGNOSIS — M17.0 PRIMARY OSTEOARTHRITIS OF BOTH KNEES: Primary | ICD-10-CM

## 2021-09-23 DIAGNOSIS — M25.562 CHRONIC PAIN OF BOTH KNEES: ICD-10-CM

## 2021-09-23 DIAGNOSIS — G89.29 CHRONIC PAIN OF BOTH KNEES: ICD-10-CM

## 2021-09-23 PROCEDURE — 20610 DRAIN/INJ JOINT/BURSA W/O US: CPT | Performed by: ORTHOPAEDIC SURGERY

## 2021-09-23 PROCEDURE — 99213 OFFICE O/P EST LOW 20 MIN: CPT | Performed by: ORTHOPAEDIC SURGERY

## 2021-09-23 RX ORDER — LIDOCAINE HYDROCHLORIDE 10 MG/ML
2 INJECTION, SOLUTION INFILTRATION; PERINEURAL
Status: COMPLETED | OUTPATIENT
Start: 2021-09-23 | End: 2021-09-23

## 2021-09-23 RX ORDER — BUPIVACAINE HYDROCHLORIDE 2.5 MG/ML
2 INJECTION, SOLUTION INFILTRATION; PERINEURAL
Status: COMPLETED | OUTPATIENT
Start: 2021-09-23 | End: 2021-09-23

## 2021-09-23 RX ORDER — BETAMETHASONE SODIUM PHOSPHATE AND BETAMETHASONE ACETATE 3; 3 MG/ML; MG/ML
12 INJECTION, SUSPENSION INTRA-ARTICULAR; INTRALESIONAL; INTRAMUSCULAR; SOFT TISSUE
Status: COMPLETED | OUTPATIENT
Start: 2021-09-23 | End: 2021-09-23

## 2021-09-23 RX ADMIN — LIDOCAINE HYDROCHLORIDE 2 ML: 10 INJECTION, SOLUTION INFILTRATION; PERINEURAL at 14:08

## 2021-09-23 RX ADMIN — BETAMETHASONE SODIUM PHOSPHATE AND BETAMETHASONE ACETATE 12 MG: 3; 3 INJECTION, SUSPENSION INTRA-ARTICULAR; INTRALESIONAL; INTRAMUSCULAR; SOFT TISSUE at 14:08

## 2021-09-23 RX ADMIN — BUPIVACAINE HYDROCHLORIDE 2 ML: 2.5 INJECTION, SOLUTION INFILTRATION; PERINEURAL at 14:08

## 2021-09-23 NOTE — PROGRESS NOTES
Assessment  Problem List Items Addressed This Visit        Musculoskeletal and Integument    Primary osteoarthritis of both knees - Primary    Relevant Orders    Large joint arthrocentesis      Other Visit Diagnoses     Chronic pain of both knees        Relevant Orders    Large joint arthrocentesis          Discussion and Plan:    Presents for 3 month follow up on bilateral knee osteoarthritis  Bilateral knee corticosteroid injections were offered, accepted, and performed  Patient tolerated well  He can follow up in 3 months for recheck  Subjective:   Patient ID: Alexandre Valenzuela is a 80 y o  male      Presents for 3 month follow up for bilateral knee pain secondary to known osteoarthritis  He says that his pain in bilateral knees are the same in severity and exacerbated by activity and relieved by rest  He has had good symptomatic relief with bilateral corticosteroid injections and presents today to receive them again  No fevers, chills, or other complaints  The following portions of the patient's history were reviewed and updated as appropriate: allergies, current medications, past family history, past medical history, past social history, past surgical history and problem list     Review of Systems   Constitutional: Negative for fever  HENT: Negative for congestion  Eyes: Negative for photophobia  Respiratory: Negative for shortness of breath  Cardiovascular: Negative for chest pain  Gastrointestinal: Negative for nausea and vomiting  Musculoskeletal: Positive for arthralgias  Skin: Negative for rash  Allergic/Immunologic: Negative for immunocompromised state  Neurological: Negative for headaches  Psychiatric/Behavioral: Negative for behavioral problems         Objective:  /68   Pulse 87   Ht 5' 10" (1 778 m)   Wt 65 8 kg (145 lb)   BMI 20 81 kg/m²       Right Knee Exam     Comments:  Bilateral knees:   Skin intact   No effusion or erythema   None TTP   Normal strength Good range of motion  Calf compartment soft and supple  Sensation intact  Toes are warm well perfused                Physical Exam  Vitals reviewed  HENT:      Head: Normocephalic  Right Ear: External ear normal       Left Ear: External ear normal       Nose: Nose normal       Mouth/Throat:      Pharynx: Oropharynx is clear  Eyes:      Pupils: Pupils are equal, round, and reactive to light  Cardiovascular:      Rate and Rhythm: Normal rate  Pulses: Normal pulses  Pulmonary:      Effort: Pulmonary effort is normal    Abdominal:      Palpations: Abdomen is soft  Musculoskeletal:      Cervical back: Normal range of motion  Comments: Please see ortho exam    Skin:     Capillary Refill: Capillary refill takes less than 2 seconds  Neurological:      Mental Status: He is alert  Mental status is at baseline  Psychiatric:         Mood and Affect: Mood normal          Large joint arthrocentesis: bilateral knee  Universal Protocol:  Consent: Verbal consent obtained    Risks and benefits: risks, benefits and alternatives were discussed  Consent given by: patient  Patient identity confirmed: verbally with patient    Supporting Documentation  Indications: pain   Procedure Details  Location: knee - bilateral knee  Preparation: Patient was prepped and draped in the usual sterile fashion  Needle size: 22 G  Ultrasound guidance: no  Approach: anterolateral    Medications (Right): 2 mL bupivacaine 0 25 %; 2 mL lidocaine 1 %; 12 mg betamethasone acetate-betamethasone sodium phosphate 6 (3-3) mg/mLMedications (Left): 2 mL bupivacaine 0 25 %; 2 mL lidocaine 1 %; 12 mg betamethasone acetate-betamethasone sodium phosphate 6 (3-3) mg/mL   Patient tolerance: patient tolerated the procedure well with no immediate complications  Dressing:  Sterile dressing applied

## 2021-10-06 ENCOUNTER — IMMUNIZATIONS (OUTPATIENT)
Dept: FAMILY MEDICINE CLINIC | Facility: CLINIC | Age: 86
End: 2021-10-06
Payer: MEDICARE

## 2021-10-06 DIAGNOSIS — Z23 ENCOUNTER FOR IMMUNIZATION: Primary | ICD-10-CM

## 2021-10-06 PROCEDURE — 90662 IIV NO PRSV INCREASED AG IM: CPT

## 2021-10-06 PROCEDURE — G0008 ADMIN INFLUENZA VIRUS VAC: HCPCS

## 2021-11-11 DIAGNOSIS — E03.9 ACQUIRED HYPOTHYROIDISM: ICD-10-CM

## 2021-11-11 RX ORDER — LEVOTHYROXINE SODIUM 0.07 MG/1
TABLET ORAL
Qty: 90 TABLET | Refills: 3 | Status: SHIPPED | OUTPATIENT
Start: 2021-11-11

## 2021-12-23 ENCOUNTER — OFFICE VISIT (OUTPATIENT)
Dept: OBGYN CLINIC | Facility: HOSPITAL | Age: 86
End: 2021-12-23
Payer: MEDICARE

## 2021-12-23 VITALS
HEART RATE: 81 BPM | HEIGHT: 70 IN | WEIGHT: 151 LBS | BODY MASS INDEX: 21.62 KG/M2 | SYSTOLIC BLOOD PRESSURE: 109 MMHG | DIASTOLIC BLOOD PRESSURE: 72 MMHG

## 2021-12-23 DIAGNOSIS — M25.561 CHRONIC PAIN OF BOTH KNEES: ICD-10-CM

## 2021-12-23 DIAGNOSIS — M17.0 PRIMARY OSTEOARTHRITIS OF BOTH KNEES: Primary | ICD-10-CM

## 2021-12-23 DIAGNOSIS — G89.29 CHRONIC PAIN OF BOTH KNEES: ICD-10-CM

## 2021-12-23 DIAGNOSIS — M25.562 CHRONIC PAIN OF BOTH KNEES: ICD-10-CM

## 2021-12-23 PROCEDURE — 99213 OFFICE O/P EST LOW 20 MIN: CPT | Performed by: ORTHOPAEDIC SURGERY

## 2021-12-23 PROCEDURE — 20610 DRAIN/INJ JOINT/BURSA W/O US: CPT | Performed by: ORTHOPAEDIC SURGERY

## 2021-12-23 RX ORDER — BUPIVACAINE HYDROCHLORIDE 2.5 MG/ML
2 INJECTION, SOLUTION INFILTRATION; PERINEURAL
Status: COMPLETED | OUTPATIENT
Start: 2021-12-23 | End: 2021-12-23

## 2021-12-23 RX ORDER — LIDOCAINE HYDROCHLORIDE 10 MG/ML
2 INJECTION, SOLUTION INFILTRATION; PERINEURAL
Status: COMPLETED | OUTPATIENT
Start: 2021-12-23 | End: 2021-12-23

## 2021-12-23 RX ORDER — BETAMETHASONE SODIUM PHOSPHATE AND BETAMETHASONE ACETATE 3; 3 MG/ML; MG/ML
12 INJECTION, SUSPENSION INTRA-ARTICULAR; INTRALESIONAL; INTRAMUSCULAR; SOFT TISSUE
Status: COMPLETED | OUTPATIENT
Start: 2021-12-23 | End: 2021-12-23

## 2021-12-23 RX ORDER — IBUPROFEN 600 MG/1
TABLET ORAL
COMMUNITY
Start: 2021-12-17 | End: 2022-03-10 | Stop reason: ALTCHOICE

## 2021-12-23 RX ORDER — CHLORHEXIDINE GLUCONATE 0.12 MG/ML
RINSE ORAL
COMMUNITY
Start: 2021-12-13

## 2021-12-23 RX ORDER — AMOXICILLIN 500 MG/1
CAPSULE ORAL
COMMUNITY
Start: 2021-12-13

## 2021-12-23 RX ORDER — OXYCODONE HYDROCHLORIDE AND ACETAMINOPHEN 5; 325 MG/1; MG/1
1 TABLET ORAL EVERY 6 HOURS PRN
COMMUNITY
Start: 2021-12-13 | End: 2022-03-10 | Stop reason: ALTCHOICE

## 2021-12-23 RX ADMIN — BUPIVACAINE HYDROCHLORIDE 2 ML: 2.5 INJECTION, SOLUTION INFILTRATION; PERINEURAL at 13:16

## 2021-12-23 RX ADMIN — BETAMETHASONE SODIUM PHOSPHATE AND BETAMETHASONE ACETATE 12 MG: 3; 3 INJECTION, SUSPENSION INTRA-ARTICULAR; INTRALESIONAL; INTRAMUSCULAR; SOFT TISSUE at 13:16

## 2021-12-23 RX ADMIN — LIDOCAINE HYDROCHLORIDE 2 ML: 10 INJECTION, SOLUTION INFILTRATION; PERINEURAL at 13:16

## 2021-12-28 ENCOUNTER — TELEPHONE (OUTPATIENT)
Dept: FAMILY MEDICINE CLINIC | Facility: CLINIC | Age: 86
End: 2021-12-28

## 2021-12-28 ENCOUNTER — OFFICE VISIT (OUTPATIENT)
Dept: FAMILY MEDICINE CLINIC | Facility: CLINIC | Age: 86
End: 2021-12-28
Payer: MEDICARE

## 2021-12-28 VITALS
RESPIRATION RATE: 16 BRPM | TEMPERATURE: 97.8 F | HEART RATE: 84 BPM | SYSTOLIC BLOOD PRESSURE: 98 MMHG | OXYGEN SATURATION: 99 % | HEIGHT: 70 IN | DIASTOLIC BLOOD PRESSURE: 66 MMHG | BODY MASS INDEX: 20.96 KG/M2 | WEIGHT: 146.4 LBS

## 2021-12-28 DIAGNOSIS — R42 DIZZY SPELLS: Primary | ICD-10-CM

## 2021-12-28 DIAGNOSIS — R26.89 IMBALANCE: ICD-10-CM

## 2021-12-28 DIAGNOSIS — I45.10 RBBB (RIGHT BUNDLE BRANCH BLOCK): ICD-10-CM

## 2021-12-28 DIAGNOSIS — H90.6 MIXED CONDUCTIVE AND SENSORINEURAL HEARING LOSS OF BOTH EARS: ICD-10-CM

## 2021-12-28 PROBLEM — M20.40 HAMMERTOE: Status: ACTIVE | Noted: 2018-11-06

## 2021-12-28 PROBLEM — I73.9 PVD (PERIPHERAL VASCULAR DISEASE) (HCC): Status: ACTIVE | Noted: 2018-09-03

## 2021-12-28 PROBLEM — M25.552 LEFT HIP PAIN: Status: ACTIVE | Noted: 2017-09-07

## 2021-12-28 PROCEDURE — 99214 OFFICE O/P EST MOD 30 MIN: CPT | Performed by: FAMILY MEDICINE

## 2021-12-28 PROCEDURE — 93000 ELECTROCARDIOGRAM COMPLETE: CPT | Performed by: FAMILY MEDICINE

## 2022-02-22 ENCOUNTER — APPOINTMENT (OUTPATIENT)
Dept: LAB | Facility: HOSPITAL | Age: 87
End: 2022-02-22
Payer: MEDICARE

## 2022-02-22 DIAGNOSIS — E03.9 ACQUIRED HYPOTHYROIDISM: ICD-10-CM

## 2022-02-22 DIAGNOSIS — E11.40 CONTROLLED TYPE 2 DIABETES MELLITUS WITH DIABETIC NEUROPATHY, WITHOUT LONG-TERM CURRENT USE OF INSULIN (HCC): ICD-10-CM

## 2022-02-22 LAB
ALBUMIN SERPL BCP-MCNC: 3.4 G/DL (ref 3.5–5)
ALP SERPL-CCNC: 45 U/L (ref 46–116)
ALT SERPL W P-5'-P-CCNC: 36 U/L (ref 12–78)
ANION GAP SERPL CALCULATED.3IONS-SCNC: 6 MMOL/L (ref 4–13)
AST SERPL W P-5'-P-CCNC: 20 U/L (ref 5–45)
BILIRUB SERPL-MCNC: 0.77 MG/DL (ref 0.2–1)
BUN SERPL-MCNC: 21 MG/DL (ref 5–25)
CALCIUM ALBUM COR SERPL-MCNC: 9.3 MG/DL (ref 8.3–10.1)
CALCIUM SERPL-MCNC: 8.8 MG/DL (ref 8.3–10.1)
CHLORIDE SERPL-SCNC: 104 MMOL/L (ref 100–108)
CO2 SERPL-SCNC: 30 MMOL/L (ref 21–32)
CREAT SERPL-MCNC: 1.02 MG/DL (ref 0.6–1.3)
EST. AVERAGE GLUCOSE BLD GHB EST-MCNC: 146 MG/DL
GFR SERPL CREATININE-BSD FRML MDRD: 66 ML/MIN/1.73SQ M
GLUCOSE P FAST SERPL-MCNC: 140 MG/DL (ref 65–99)
HBA1C MFR BLD: 6.7 %
POTASSIUM SERPL-SCNC: 4.2 MMOL/L (ref 3.5–5.3)
PROT SERPL-MCNC: 6.7 G/DL (ref 6.4–8.2)
SODIUM SERPL-SCNC: 140 MMOL/L (ref 136–145)
TSH SERPL DL<=0.05 MIU/L-ACNC: 2.03 UIU/ML (ref 0.36–3.74)

## 2022-02-22 PROCEDURE — 84443 ASSAY THYROID STIM HORMONE: CPT

## 2022-02-22 PROCEDURE — 36415 COLL VENOUS BLD VENIPUNCTURE: CPT

## 2022-02-22 PROCEDURE — 80053 COMPREHEN METABOLIC PANEL: CPT

## 2022-02-22 PROCEDURE — 83036 HEMOGLOBIN GLYCOSYLATED A1C: CPT

## 2022-03-10 ENCOUNTER — OFFICE VISIT (OUTPATIENT)
Dept: FAMILY MEDICINE CLINIC | Facility: CLINIC | Age: 87
End: 2022-03-10
Payer: MEDICARE

## 2022-03-10 VITALS
RESPIRATION RATE: 16 BRPM | TEMPERATURE: 98.9 F | WEIGHT: 151.2 LBS | DIASTOLIC BLOOD PRESSURE: 66 MMHG | HEART RATE: 84 BPM | OXYGEN SATURATION: 98 % | HEIGHT: 70 IN | BODY MASS INDEX: 21.64 KG/M2 | SYSTOLIC BLOOD PRESSURE: 112 MMHG

## 2022-03-10 DIAGNOSIS — E11.40 CONTROLLED TYPE 2 DIABETES MELLITUS WITH DIABETIC NEUROPATHY, WITHOUT LONG-TERM CURRENT USE OF INSULIN (HCC): Primary | ICD-10-CM

## 2022-03-10 DIAGNOSIS — H90.6 MIXED CONDUCTIVE AND SENSORINEURAL HEARING LOSS OF BOTH EARS: ICD-10-CM

## 2022-03-10 DIAGNOSIS — R26.89 BALANCE DISORDER: ICD-10-CM

## 2022-03-10 DIAGNOSIS — Z00.00 MEDICARE ANNUAL WELLNESS VISIT, SUBSEQUENT: ICD-10-CM

## 2022-03-10 DIAGNOSIS — E03.9 ACQUIRED HYPOTHYROIDISM: ICD-10-CM

## 2022-03-10 PROBLEM — E11.51 DIABETES MELLITUS WITH PERIPHERAL VASCULAR DISEASE (HCC): Status: ACTIVE | Noted: 2021-12-15

## 2022-03-10 PROCEDURE — 99214 OFFICE O/P EST MOD 30 MIN: CPT | Performed by: FAMILY MEDICINE

## 2022-03-10 PROCEDURE — G0438 PPPS, INITIAL VISIT: HCPCS | Performed by: FAMILY MEDICINE

## 2022-03-10 NOTE — PROGRESS NOTES
Chief Complaint   Patient presents with   [facility] Wellness Visit     Subsequent   Follow-up     6 months and review labs  Health Maintenance   Topic Date Due    DTaP,Tdap,and Td Vaccines (1 - Tdap) 09/20/2011    Medicare Annual Wellness Visit (AWV)  02/11/2022    Diabetic Foot Exam  08/13/2022    URINE MICROALBUMIN  08/19/2022    HEMOGLOBIN A1C  08/22/2022    DM Eye Exam  11/24/2022    Fall Risk  03/10/2023    Depression Screening  03/10/2023    BMI: Adult  03/10/2023    Pneumococcal Vaccine: 65+ Years  Completed    Influenza Vaccine  Completed    COVID-19 Vaccine  Completed    HIB Vaccine  Aged Out    Hepatitis B Vaccine  Aged Out    IPV Vaccine  Aged Out    Hepatitis A Vaccine  Aged Out    Meningococcal ACWY Vaccine  Aged Out    HPV Vaccine  Aged Out      Assessment and Plan:     Problem List Items Addressed This Visit        Endocrine    Hypothyroidism     2/2022 TSH normal  Continue levothyroxine 75mcg daily  Relevant Orders    CBC    Comprehensive metabolic panel    Hemoglobin A1C    TSH, 3rd generation with Free T4 reflex    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, without long-term current use of insulin ([address] ) - Primary       Lab Results   Component Value Date    HGBA1C 6 7 (H) 02/22/2022     Diet control  Low carb diet  Relevant Orders    Ambulatory Referral to Ophthalmology    CBC    Comprehensive metabolic panel    Hemoglobin A1C    TSH, 3rd generation with Free T4 reflex       Nervous and Auditory    Loss of hearing     Had cochlear implant, so he did not do brain MRI  Other    Balance disorder     Feels better  No recent falls  Other Visit Diagnoses     Medicare annual wellness visit, subsequent              Depression Screening and Follow-up Plan: Patient was screened for depression during today's encounter  They screened negative with a PHQ-2 score of 0        Preventive health issues were discussed with patient, and age appropriate screening tests were ordered as noted in patient's After Visit Summary  Personalized health advice and appropriate referrals for health education or preventive services given if needed, as noted in patient's After Visit Summary       History of Present Illness:     Patient presents for Medicare Annual Wellness visit    Patient Care Team:  Saúl Mercer MD as PCP - General (Family Medicine)  Que Vinson DO     Problem List:     Patient Active Problem List   Diagnosis    Hypothyroidism    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, without long-term current use of insulin ([facility] )    Loss of hearing    Primary osteoarthritis of left hip    Primary osteoarthritis of both knees    Male erectile dysfunction, unspecified    Vertigo    Balance disorder    RBBB (right bundle branch block)    PVD (peripheral vascular disease) ([facility] )    Pancreatic cyst    Left hip pain    Hammertoe    Seborrheic keratoses    Allergic rhinitis    Diabetes mellitus with [facility] vascular disease [facility] )      Past Medical and Surgical History:     Past Medical History:   Diagnosis Date    Abnormal loss of weight     last assessed: 4/24/13    Diverticulitis     Diverticulosis      Past Surgical History:   Procedure Laterality Date    APPENDECTOMY      Age 12    CATARACT EXTRACTION      right 5/2002, left 12/2003    COLON SURGERY      for diverticulitis    COLONOSCOPY      done 5/5/2009, mild diverticulosis noted, recheck in 3 years    INNER EAR SURGERY Left     Cochlear device implantation; resolved: Sep 2012    SHOULDER SURGERY  05/2007    right rotator cuff      Family History:     Family History   Problem Relation Age of Onset    Coronary artery disease Sister     Diabetes Sister     Diabetes Daughter       Social History:     Social History     Socioeconomic History    Marital status: /Civil Union     Spouse name: None    Number of children: None    Years of education: None    Highest education level: None   Occupational History    None   Tobacco Use    Smoking status: Never Smoker    Smokeless tobacco: Never Used   Substance and Sexual Activity    Alcohol use: No     Comment: social drinker as per [employer]    Drug use: No    Sexual activity: None   Other Topics Concern    None   Social History Narrative    None     Social Determinants of Health     Financial Resource Strain: Not on file   Food Insecurity: Not on file   Transportation Needs: Not on file   Physical Activity: Not on file   Stress: Not on file   Social Connections: Not on file   Intimate Partner Violence: Not on file   Housing Stability: Not on file      Medications and Allergies:     Current Outpatient Medications   Medication Sig Dispense Refill    amoxicillin (AMOXIL) 500 mg capsule TAKE 1 CAPSULE BY MOUTH EVERY 8 HOURS UNTIL GONE      chlorhexidine (PERIDEX) 0 12 % solution       levothyroxine 75 mcg tablet TAKE 1 TABLET(75 MCG) BY MOUTH DAILY 90 tablet 3    sildenafil (VIAGRA) 50 MG tablet Take 1 tablet (50 mg total) by mouth as needed for erectile dysfunction (Patient not taking: Reported on 8/12/2021) 10 tablet 2     No current facility-administered medications for this visit       Allergies   Allergen Reactions    Flagyl [Metronidazole] Other (See Comments)     nausea      Immunizations:     Immunization History   Administered Date(s) Administered    COVID-19 MODERNA VACC 0 5 ML IM 01/25/2021, 03/01/2021, 11/05/2021    H1N1, All Formulations 01/31/2010    Influenza Split High Dose Preservative Free IM 11/09/2015, 11/10/2016, 09/23/2017    Influenza, high dose seasonal 0 7 mL 10/18/2018, 12/19/2019, 10/07/2020, 10/06/2021    Influenza, seasonal, injectable 10/02/2012, 10/21/2013, 11/17/2014, 11/18/2014    Pneumococcal Conjugate 13-Valent 12/03/2013, 12/17/2014    Pneumococcal Polysaccharide PPV23 01/01/2005    Td (adult), adsorbed 09/19/2011    Zoster 11/06/2012    Zoster Vaccine Recombinant 12/10/2018, 06/13/2019      Health Maintenance: There are no preventive care reminders to display for this patient  Topic Date Due    DTaP,Tdap,and Td Vaccines (1 - Tdap) 09/20/2011      Medicare Health Risk Assessment:     /66 (BP Location: Left arm, Patient Position: Sitting, Cuff Size: Adult)   Pulse 84   Temp 98 9 °F (37 2 °C) (Tympanic)   Resp 16   Ht 5' 10" (1 778 m)   Wt 68 6 kg (151 lb 3 2 oz)   SpO2 98%   BMI 21 69 kg/m²      Sara Atkins is here for his Subsequent Wellness visit  Health Risk Assessment:   Patient rates overall health as very good  Patient feels that their physical health rating is same  Patient is very satisfied with their life  Eyesight was rated as same  Hearing was rated as same  Patient feels that their emotional and mental health rating is same  Patients states they are never, rarely angry  Patient states they are never, rarely unusually tired/fatigued  Pain experienced in the last 7 days has been none  Patient states that he has experienced no weight loss or gain in last 6 months  Depression Screening:   PHQ-2 Score: 0      Fall Risk Screening: In the past year, patient has experienced: no history of falling in past year      Home Safety:  Patient does not have trouble with stairs inside or outside of their home  Patient has working smoke alarms and has working carbon monoxide detector  Home safety hazards include: none  Nutrition:   Current diet is Regular and Frequent junk food  Medications:   Patient is currently taking over-the-counter supplements  OTC medications include: see medication list  Patient is able to manage medications  Activities of Daily Living (ADLs)/Instrumental Activities of Daily Living (IADLs):   Walk and transfer into and out of bed and chair?: Yes  Dress and groom yourself?: Yes    Bathe or shower yourself?: Yes    Feed yourself?  Yes  Do your laundry/housekeeping?: Yes  Manage your money, pay your bills and track your expenses?: Yes  Make your own meals?: Yes    Do your own shopping?: Yes    Previous Hospitalizations:   Any hospitalizations or ED visits within the last 12 months?: No      Cognitive Screening:   Provider or family/friend/caregiver concerned regarding cognition?: No    PREVENTIVE SCREENINGS      Cardiovascular Screening:    General: Screening Not Indicated      Diabetes Screening:     General: History Diabetes and Screening Current      Colorectal Cancer Screening:     General: Screening Not Indicated      Prostate Cancer Screening:    General: Screening Not Indicated      Lung Cancer Screening:     General: Screening Not Indicated    Screening, Brief Intervention, and Referral to Treatment (SBIRT)    Screening  Typical number of drinks in a day: 0  Typical number of drinks in a week: 0  Interpretation: Low risk drinking behavior      Single Item Drug Screening:  How often have you used an illegal drug (including marijuana) or a prescription medication for non-medical reasons in the past year? never    Single Item Drug Screen Score: 0  Interpretation: Negative screen for possible drug use disorder      Aurea Caba MD

## 2022-03-10 NOTE — PROGRESS NOTES
Assessment/Plan:    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, without long-term current use of insulin ([facility])    Lab Results   Component Value Date    HGBA1C 6 7 (H) 02/22/2022     Diet control  Low carb diet  Hypothyroidism  2/2022 TSH normal  Continue levothyroxine 75mcg daily  Loss of hearing  Had cochlear implant, so he did not do brain MRI  Balance disorder  Feels better  No recent falls  Reviewed lab in 2/2022  TSH normal  CMP ok  hgA1C 6 7 OK for his age  Flu shots yearly  Got Covid19 shots and booster  Got PCV13 and pneumovax  Got shingrix  Due for Tdap  Fall precautions  RTO in 6 months  Diagnoses and all orders for this visit:    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, without long-term current use of insulin ([facility] )  -     Ambulatory Referral to Ophthalmology; Future  -     CBC; Future  -     Comprehensive metabolic panel; Future  -     Hemoglobin A1C; Future  -     TSH, 3rd generation with Free T4 reflex; Future    Acquired hypothyroidism  -     CBC; Future  -     Comprehensive metabolic panel; Future  -     Hemoglobin A1C; Future  -     TSH, 3rd generation with Free T4 reflex; Future    Mixed conductive and sensorineural hearing loss of both ears    Balance disorder    Medicare annual wellness visit, subsequent          Subjective:      Patient ID: Alissa Ponce is a 80 y o  male  HPI    He is here with his wife  Dizzy got better  He tried to drank more fluids  He did not do brain MRI because of cochlear implant  No recent falls  DM----2/2022 HgA1C 6 7 controlled  He ate more dessert recently  Will try to eat better  Denies hypoglycemia  Neuropathy on feet  [facility] ophthalmology  Need better glasses  [facility]  Hypothyroidism---He is on levothyroxine 75mcg daily  Feels fine  No smoking or alcohol  Live with wife  Does all ADL's  Very active/play golf/gardening  Denies recent falls  Denies depression  The following portions of the patient's history were reviewed and updated as appropriate: allergies, current medications, past family history, past medical history, past social history, past surgical history and problem list     Review of Systems   Constitutional: Negative for appetite change, chills and fever  HENT: Negative for congestion, ear pain, sinus pain and sore throat  Eyes: Negative for discharge and itching  Respiratory: Negative for apnea, cough, chest tightness, shortness of breath and wheezing  Cardiovascular: Negative for chest pain, palpitations and leg swelling  Gastrointestinal: Negative for abdominal pain, anal bleeding, constipation, diarrhea, nausea and vomiting  Endocrine: Negative for cold intolerance, heat intolerance and polyuria  Genitourinary: Negative for difficulty urinating and dysuria  Musculoskeletal: Negative for arthralgias, back pain and myalgias  Skin: Negative for rash  Neurological: Negative for dizziness and headaches  Psychiatric/Behavioral: Negative for agitation  Objective:      /66 (BP Location: Left arm, Patient Position: Sitting, Cuff Size: Adult)   Pulse 84   Temp 98 9 °F (37 2 °C) (Tympanic)   Resp 16   Ht 5' 10" (1 778 m)   Wt 68 6 kg (151 lb 3 2 oz)   SpO2 98%   BMI 21 69 kg/m²          Physical Exam  Constitutional:       Appearance: He is well-developed  HENT:      Head: Normocephalic and atraumatic  Eyes:      General:         Right eye: No discharge  Left eye: No discharge  Conjunctiva/sclera: Conjunctivae normal    Cardiovascular:      Rate and Rhythm: Normal rate and regular rhythm  Heart sounds: Normal heart sounds  No murmur heard  No friction rub  No gallop  Pulmonary:      Effort: Pulmonary effort is normal  No respiratory distress  Breath sounds: Normal breath sounds  No wheezing or rales     Abdominal:      General: Bowel sounds are normal  There is no distension  Palpations: Abdomen is soft  Tenderness: There is no abdominal tenderness  There is no guarding  Musculoskeletal:         General: Normal range of motion  Cervical back: Normal range of motion and neck supple  No tenderness  Right lower leg: No edema  Left lower leg: No edema  Lymphadenopathy:      Cervical: No cervical adenopathy  Neurological:      Mental Status: He is alert

## 2022-03-23 ENCOUNTER — OFFICE VISIT (OUTPATIENT)
Dept: OBGYN CLINIC | Facility: HOSPITAL | Age: 87
End: 2022-03-23
Payer: MEDICARE

## 2022-03-23 VITALS
SYSTOLIC BLOOD PRESSURE: 116 MMHG | DIASTOLIC BLOOD PRESSURE: 68 MMHG | BODY MASS INDEX: 21.59 KG/M2 | WEIGHT: 150.8 LBS | HEIGHT: 70 IN | HEART RATE: 80 BPM

## 2022-03-23 DIAGNOSIS — G89.29 CHRONIC PAIN OF BOTH KNEES: ICD-10-CM

## 2022-03-23 DIAGNOSIS — M25.562 CHRONIC PAIN OF BOTH KNEES: ICD-10-CM

## 2022-03-23 DIAGNOSIS — M17.0 PRIMARY OSTEOARTHRITIS OF BOTH KNEES: Primary | ICD-10-CM

## 2022-03-23 DIAGNOSIS — M25.561 CHRONIC PAIN OF BOTH KNEES: ICD-10-CM

## 2022-03-23 PROCEDURE — 20610 DRAIN/INJ JOINT/BURSA W/O US: CPT | Performed by: ORTHOPAEDIC SURGERY

## 2022-03-23 PROCEDURE — 99213 OFFICE O/P EST LOW 20 MIN: CPT | Performed by: ORTHOPAEDIC SURGERY

## 2022-03-23 RX ORDER — BETAMETHASONE SODIUM PHOSPHATE AND BETAMETHASONE ACETATE 3; 3 MG/ML; MG/ML
12 INJECTION, SUSPENSION INTRA-ARTICULAR; INTRALESIONAL; INTRAMUSCULAR; SOFT TISSUE
Status: COMPLETED | OUTPATIENT
Start: 2022-03-23 | End: 2022-03-23

## 2022-03-23 RX ORDER — BUPIVACAINE HYDROCHLORIDE 2.5 MG/ML
2 INJECTION, SOLUTION INFILTRATION; PERINEURAL
Status: COMPLETED | OUTPATIENT
Start: 2022-03-23 | End: 2022-03-23

## 2022-03-23 RX ORDER — LIDOCAINE HYDROCHLORIDE 10 MG/ML
2 INJECTION, SOLUTION INFILTRATION; PERINEURAL
Status: COMPLETED | OUTPATIENT
Start: 2022-03-23 | End: 2022-03-23

## 2022-03-23 RX ADMIN — LIDOCAINE HYDROCHLORIDE 2 ML: 10 INJECTION, SOLUTION INFILTRATION; PERINEURAL at 13:44

## 2022-03-23 RX ADMIN — BETAMETHASONE SODIUM PHOSPHATE AND BETAMETHASONE ACETATE 12 MG: 3; 3 INJECTION, SUSPENSION INTRA-ARTICULAR; INTRALESIONAL; INTRAMUSCULAR; SOFT TISSUE at 13:44

## 2022-03-23 RX ADMIN — BUPIVACAINE HYDROCHLORIDE 2 ML: 2.5 INJECTION, SOLUTION INFILTRATION; PERINEURAL at 13:44

## 2022-03-23 NOTE — PROGRESS NOTES
Assessment:   Diagnosis ICD-10-CM Associated Orders   1  Primary osteoarthritis of both knees  M17 0 Large joint arthrocentesis: bilateral knee   2  Chronic pain of both knees  M25 561 Large joint arthrocentesis: bilateral knee    M25 562     G89 29        Plan:  Diagnosis, treatment options and associated risks were discussed with the patient including no treatment, nonsurgical treatment and potential for surgical intervention  The patient was given the opportunity to ask questions regarding each  Mr Josefa Duckworth was offered, accepted, performed injections of cortisone (RCC) to both knees today for symptomatic relief  Mr Josefa Duckworth tolerated both injections well  Ice and post injection protocol advised  Weightbearing activities as tolerated  To do next visit:  Return in about 3 months (around 6/23/2022) for re-check, or sooner if symptoms worsen or fail to improve  The above stated was discussed in layman's terms and the patient expressed understanding  All questions were answered to the patient's satisfaction  Scribe Attestation    I,:  Marquis Willett am acting as a scribe while in the presence of the attending physician :       I,:  August Jacob MD personally performed the services described in this documentation    as scribed in my presence :             Subjective: Apollo Garcia is a 80 y o  male who presents today for repeat evaluation of his bilateral knees due to return of pain  He finds relief of his knee symptoms with periodic injections of cortisone every 3 months  He was able to golf last week due to the nice weather  He has increased knee pain with weight-bearing activities  He continues to wish to have knee injections        Review of systems negative unless otherwise specified in HPI  Review of Systems    Past Medical History:   Diagnosis Date    Abnormal loss of weight     last assessed: 4/24/13    Diverticulitis     Diverticulosis        Past Surgical History:   Procedure Laterality Date    APPENDECTOMY      Age 12    CATARACT EXTRACTION      right 5/2002, left 12/2003    COLON SURGERY      for diverticulitis    COLONOSCOPY      done 5/5/2009, mild diverticulosis noted, recheck in 3 years    INNER EAR SURGERY Left     Cochlear device implantation; resolved: Sep 2012    SHOULDER SURGERY  05/2007    right rotator cuff       Family History   Problem Relation Age of Onset    Coronary artery disease Sister     Diabetes Sister     Diabetes Daughter        Social History     Occupational History    Not on file   Tobacco Use    Smoking status: Never Smoker    Smokeless tobacco: Never Used   Substance and Sexual Activity    Alcohol use: No     Comment: social drinker as per [employer]    Drug use: No    Sexual activity: Not on file         Current Outpatient Medications:     amoxicillin (AMOXIL) 500 mg capsule, TAKE 1 CAPSULE BY MOUTH EVERY 8 HOURS UNTIL GONE, Disp: , Rfl:     chlorhexidine (PERIDEX) 0 12 % solution, , Disp: , Rfl:     levothyroxine 75 mcg tablet, TAKE 1 TABLET(75 MCG) BY MOUTH DAILY, Disp: 90 tablet, Rfl: 3    sildenafil (VIAGRA) 50 MG tablet, Take 1 tablet (50 mg total) by mouth as needed for erectile dysfunction (Patient not taking: Reported on 8/12/2021), Disp: 10 tablet, Rfl: 2    Allergies   Allergen Reactions    Flagyl [Metronidazole] Other (See Comments)     nausea            Vitals:    03/23/22 1321   BP: 116/68   Pulse: 80       Objective:                    Right Knee Exam     Muscle Strength   The patient has normal right knee strength  Tenderness   The patient is experiencing tenderness in the medial joint line  Range of Motion   The patient has normal right knee ROM  Right knee flexion: With crepitation stiffness  Other   Erythema: absent  Sensation: normal  Swelling: none  Effusion: no effusion present      Left Knee Exam     Muscle Strength   The patient has normal left knee strength      Tenderness   The patient is experiencing tenderness in the medial joint line  Range of Motion   The patient has normal left knee ROM  Left knee flexion: With crepitation stiffness  Other   Erythema: absent  Sensation: normal  Swelling: none  Effusion: no effusion present    Comments: Both knees remain in mild varus alignment with bony enlargement medially  Diagnostics, reviewed and taken today if performed as documented:    None performed          Procedures, if performed today:    Large joint arthrocentesis: bilateral knee  Universal Protocol:  Consent: Verbal consent obtained  Risks and benefits: risks, benefits and alternatives were discussed  Consent given by: patient  Time out: Immediately prior to procedure a "time out" was called to verify the correct patient, procedure, equipment, support staff and site/side marked as required  Timeout called at: 3/23/2022 1:44 PM   Patient understanding: patient states understanding of the procedure being performed  Site marked: the operative site was marked  Patient identity confirmed: verbally with patient    Supporting Documentation  Indications: pain and diagnostic evaluation   Procedure Details  Location: knee - bilateral knee  Preparation: Patient was prepped and draped in the usual sterile fashion  Needle size: 22 G  Ultrasound guidance: no  Approach: anterolateral    Medications (Right): 2 mL bupivacaine 0 25 %; 2 mL lidocaine 1 %; 12 mg betamethasone acetate-betamethasone sodium phosphate 6 (3-3) mg/mLMedications (Left): 2 mL bupivacaine 0 25 %; 2 mL lidocaine 1 %; 12 mg betamethasone acetate-betamethasone sodium phosphate 6 (3-3) mg/mL   Patient tolerance: patient tolerated the procedure well with no immediate complications  Dressing:  Sterile dressing applied            Portions of the record may have been created with voice recognition software  Occasional wrong word or "sound a like" substitutions may have occurred due to the inherent limitations of voice recognition software  Read the chart carefully and recognize, using context, where substitutions have occurred

## 2022-06-22 ENCOUNTER — OFFICE VISIT (OUTPATIENT)
Dept: OBGYN CLINIC | Facility: HOSPITAL | Age: 87
End: 2022-06-22
Payer: MEDICARE

## 2022-06-22 VITALS
HEIGHT: 70 IN | DIASTOLIC BLOOD PRESSURE: 61 MMHG | SYSTOLIC BLOOD PRESSURE: 103 MMHG | HEART RATE: 87 BPM | WEIGHT: 148 LBS | BODY MASS INDEX: 21.19 KG/M2

## 2022-06-22 DIAGNOSIS — M17.0 BILATERAL PRIMARY OSTEOARTHRITIS OF KNEE: Primary | ICD-10-CM

## 2022-06-22 PROCEDURE — 20610 DRAIN/INJ JOINT/BURSA W/O US: CPT | Performed by: PHYSICIAN ASSISTANT

## 2022-06-22 PROCEDURE — 99212 OFFICE O/P EST SF 10 MIN: CPT | Performed by: PHYSICIAN ASSISTANT

## 2022-06-22 RX ORDER — BETAMETHASONE SODIUM PHOSPHATE AND BETAMETHASONE ACETATE 3; 3 MG/ML; MG/ML
12 INJECTION, SUSPENSION INTRA-ARTICULAR; INTRALESIONAL; INTRAMUSCULAR; SOFT TISSUE
Status: COMPLETED | OUTPATIENT
Start: 2022-06-22 | End: 2022-06-22

## 2022-06-22 RX ORDER — NIRMATRELVIR AND RITONAVIR 300-100 MG
KIT ORAL
COMMUNITY
Start: 2022-06-15

## 2022-06-22 RX ORDER — LIDOCAINE HYDROCHLORIDE 10 MG/ML
2 INJECTION, SOLUTION INFILTRATION; PERINEURAL
Status: COMPLETED | OUTPATIENT
Start: 2022-06-22 | End: 2022-06-22

## 2022-06-22 RX ORDER — BUPIVACAINE HYDROCHLORIDE 2.5 MG/ML
2 INJECTION, SOLUTION INFILTRATION; PERINEURAL
Status: COMPLETED | OUTPATIENT
Start: 2022-06-22 | End: 2022-06-22

## 2022-06-22 RX ADMIN — BETAMETHASONE SODIUM PHOSPHATE AND BETAMETHASONE ACETATE 12 MG: 3; 3 INJECTION, SUSPENSION INTRA-ARTICULAR; INTRALESIONAL; INTRAMUSCULAR; SOFT TISSUE at 14:22

## 2022-06-22 RX ADMIN — BUPIVACAINE HYDROCHLORIDE 2 ML: 2.5 INJECTION, SOLUTION INFILTRATION; PERINEURAL at 14:22

## 2022-06-22 RX ADMIN — LIDOCAINE HYDROCHLORIDE 2 ML: 10 INJECTION, SOLUTION INFILTRATION; PERINEURAL at 14:22

## 2022-06-22 NOTE — PROGRESS NOTES
Subjective;    59-year-old male individual who drives symptomatic improvement of bilateral knee pain by periodic injections  He presents the office today intent on receiving steroid shots for both knees    Past Medical History:   Diagnosis Date    Abnormal loss of weight     last assessed: 4/24/13    Diverticulitis     Diverticulosis        Past Surgical History:   Procedure Laterality Date    APPENDECTOMY      Age 12    CATARACT EXTRACTION      right 5/2002, left 12/2003    COLON SURGERY      for diverticulitis    COLONOSCOPY      done 5/5/2009, mild diverticulosis noted, recheck in 3 years    INNER EAR SURGERY Left     Cochlear device implantation; resolved: Sep 2012    SHOULDER SURGERY  05/2007    right rotator cuff       Family History   Problem Relation Age of Onset    Coronary artery disease Sister     Diabetes Sister     Diabetes Daughter        Social History     Tobacco Use    Smoking status: Never Smoker    Smokeless tobacco: Never Used   Substance Use Topics    Alcohol use: No     Comment: social drinker as per [employer]    Drug use: No     Exam;    Adult male in no acute distress  He has no overlying redness or bruising that would preclude safe administration of medication    He has no fluid distension or effusion of significance      Large joint arthrocentesis: R knee  Universal Protocol:  Consent given by: patient    Supporting Documentation  Indications: pain   Procedure Details  Location: knee - R knee  Needle size: 22 G  Ultrasound guidance: no  Medications administered: 2 mL bupivacaine 0 25 %; 2 mL lidocaine 1 %; 12 mg betamethasone acetate-betamethasone sodium phosphate 6 (3-3) mg/mL      Large joint arthrocentesis: L knee  Universal Protocol:  Consent given by: patient    Supporting Documentation  Indications: pain   Procedure Details  Location: knee - L knee  Needle size: 22 G  Medications administered: 2 mL bupivacaine 0 25 %; 2 mL lidocaine 1 %; 12 mg betamethasone acetate-betamethasone sodium phosphate 6 (3-3) mg/mL        Impression;    Bilateral knee arthralgia    Plan;    Patient received steroid injections to both knees  We will see him in 3 additional months in follow-up  Was my privilege to attend to him in care and at his expressed permission

## 2022-09-07 ENCOUNTER — APPOINTMENT (OUTPATIENT)
Dept: LAB | Facility: HOSPITAL | Age: 87
End: 2022-09-07
Payer: MEDICARE

## 2022-09-07 DIAGNOSIS — E11.40 CONTROLLED TYPE 2 DIABETES MELLITUS WITH DIABETIC NEUROPATHY, WITHOUT LONG-TERM CURRENT USE OF INSULIN (HCC): ICD-10-CM

## 2022-09-07 DIAGNOSIS — E03.9 ACQUIRED HYPOTHYROIDISM: ICD-10-CM

## 2022-09-07 LAB
ALBUMIN SERPL BCP-MCNC: 3.3 G/DL (ref 3.5–5)
ALP SERPL-CCNC: 52 U/L (ref 46–116)
ALT SERPL W P-5'-P-CCNC: 19 U/L (ref 12–78)
ANION GAP SERPL CALCULATED.3IONS-SCNC: 6 MMOL/L (ref 4–13)
AST SERPL W P-5'-P-CCNC: 17 U/L (ref 5–45)
BILIRUB SERPL-MCNC: 0.5 MG/DL (ref 0.2–1)
BUN SERPL-MCNC: 22 MG/DL (ref 5–25)
CALCIUM ALBUM COR SERPL-MCNC: 9.7 MG/DL (ref 8.3–10.1)
CALCIUM SERPL-MCNC: 9.1 MG/DL (ref 8.3–10.1)
CHLORIDE SERPL-SCNC: 104 MMOL/L (ref 96–108)
CO2 SERPL-SCNC: 29 MMOL/L (ref 21–32)
CREAT SERPL-MCNC: 1.23 MG/DL (ref 0.6–1.3)
ERYTHROCYTE [DISTWIDTH] IN BLOOD BY AUTOMATED COUNT: 12 % (ref 11.6–15.1)
EST. AVERAGE GLUCOSE BLD GHB EST-MCNC: 154 MG/DL
GFR SERPL CREATININE-BSD FRML MDRD: 52 ML/MIN/1.73SQ M
GLUCOSE P FAST SERPL-MCNC: 154 MG/DL (ref 65–99)
HBA1C MFR BLD: 7 %
HCT VFR BLD AUTO: 40.7 % (ref 36.5–49.3)
HGB BLD-MCNC: 13.4 G/DL (ref 12–17)
MCH RBC QN AUTO: 30.5 PG (ref 26.8–34.3)
MCHC RBC AUTO-ENTMCNC: 32.9 G/DL (ref 31.4–37.4)
MCV RBC AUTO: 93 FL (ref 82–98)
PLATELET # BLD AUTO: 218 THOUSANDS/UL (ref 149–390)
PMV BLD AUTO: 9.9 FL (ref 8.9–12.7)
POTASSIUM SERPL-SCNC: 4.5 MMOL/L (ref 3.5–5.3)
PROT SERPL-MCNC: 6.9 G/DL (ref 6.4–8.4)
RBC # BLD AUTO: 4.4 MILLION/UL (ref 3.88–5.62)
SODIUM SERPL-SCNC: 139 MMOL/L (ref 135–147)
TSH SERPL DL<=0.05 MIU/L-ACNC: 2.11 UIU/ML (ref 0.45–4.5)
WBC # BLD AUTO: 6.95 THOUSAND/UL (ref 4.31–10.16)

## 2022-09-07 PROCEDURE — 84443 ASSAY THYROID STIM HORMONE: CPT

## 2022-09-07 PROCEDURE — 85027 COMPLETE CBC AUTOMATED: CPT

## 2022-09-07 PROCEDURE — 83036 HEMOGLOBIN GLYCOSYLATED A1C: CPT

## 2022-09-07 PROCEDURE — 80053 COMPREHEN METABOLIC PANEL: CPT

## 2022-09-07 PROCEDURE — 36415 COLL VENOUS BLD VENIPUNCTURE: CPT

## 2022-09-26 ENCOUNTER — OFFICE VISIT (OUTPATIENT)
Dept: FAMILY MEDICINE CLINIC | Facility: CLINIC | Age: 87
End: 2022-09-26
Payer: MEDICARE

## 2022-09-26 VITALS
TEMPERATURE: 98.2 F | WEIGHT: 152.4 LBS | BODY MASS INDEX: 21.82 KG/M2 | HEIGHT: 70 IN | RESPIRATION RATE: 16 BRPM | DIASTOLIC BLOOD PRESSURE: 68 MMHG | HEART RATE: 76 BPM | SYSTOLIC BLOOD PRESSURE: 102 MMHG | OXYGEN SATURATION: 96 %

## 2022-09-26 DIAGNOSIS — E03.9 ACQUIRED HYPOTHYROIDISM: ICD-10-CM

## 2022-09-26 DIAGNOSIS — E11.51 DIABETES MELLITUS WITH PERIPHERAL VASCULAR DISEASE (HCC): ICD-10-CM

## 2022-09-26 DIAGNOSIS — E11.40 CONTROLLED TYPE 2 DIABETES MELLITUS WITH DIABETIC NEUROPATHY, WITHOUT LONG-TERM CURRENT USE OF INSULIN (HCC): Primary | ICD-10-CM

## 2022-09-26 DIAGNOSIS — N18.31 STAGE 3A CHRONIC KIDNEY DISEASE (HCC): ICD-10-CM

## 2022-09-26 PROBLEM — B35.1 ONYCHOMYCOSIS: Status: ACTIVE | Noted: 2022-02-16

## 2022-09-26 PROBLEM — L60.2 NAIL HYPERTROPHY: Status: ACTIVE | Noted: 2022-02-16

## 2022-09-26 PROCEDURE — 99214 OFFICE O/P EST MOD 30 MIN: CPT | Performed by: FAMILY MEDICINE

## 2022-09-26 NOTE — ASSESSMENT & PLAN NOTE
Lab Results   Component Value Date    HGBA1C 7 0 (H) 09/07/2022     Diet control  Advised pt to follow low carb diet

## 2022-09-26 NOTE — PROGRESS NOTES
Chief Complaint   Patient presents with    Follow-up     6 months and review labs  Health Maintenance   Topic Date Due    COVID-19 Vaccine (4 - Booster for Moderna series) 03/05/2022    Diabetic Foot Exam  08/13/2022    URINE MICROALBUMIN  08/19/2022    Influenza Vaccine (1) 09/01/2022    DM Eye Exam  11/24/2022    HEMOGLOBIN A1C  03/07/2023    Fall Risk  03/10/2023    Medicare Annual Wellness Visit (AWV)  03/10/2023    Depression Screening  09/26/2023    BMI: Adult  09/26/2023    Pneumococcal Vaccine: 65+ Years  Completed    HIB Vaccine  Aged Out    Hepatitis B Vaccine  Aged Out    IPV Vaccine  Aged Out    Hepatitis A Vaccine  Aged Out    Meningococcal ACWY Vaccine  Aged Out    HPV Vaccine  Aged Out           Assessment/Plan:    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, without long-term current use of insulin ([facility] [address] )    Lab Results   Component Value Date    HGBA1C 7 0 (H) 09/07/2022     Diet control  Advised pt to follow low carb diet  Hypothyroidism  9/2022 hgA1C 7 0 stable  Continue levothyroxine 75mcg daily  Stage 3a chronic kidney disease [facility])  Lab Results   Component Value Date    EGFR 52 09/07/2022    EGFR 66 02/22/2022    EGFR 62 08/19/2021    CREATININE 1 23 09/07/2022    CREATININE 1 02 02/22/2022    CREATININE 1 08 08/19/2021     Keep hydrated  Avoid motrin/ibuprofen/aleve NSAIDs nephrotoxic agents  Reviewed lab in 9/2022  TSH normal  CBC normal  CMP showed Cr 1 23, GFR 52 slightly low  HgA1C 7 0 stable    Will get Flu shot later  Got Covid19 shots and booster  Got PCV13 and pneumovax  Got shingrix  Due for Tdap  Fall precautions  RTO in 6 months            Diagnoses and all orders for this visit:    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, without long-term current use of insulin ([facility])  -     Comprehensive metabolic panel; Future  -     Hemoglobin A1C; Future  -     TSH, 3rd generation with Free T4 reflex;  Future  - Microalbumin / creatinine urine ratio; Future    Diabetes mellitus with peripheral vascular disease (HCC)    Acquired hypothyroidism  -     Comprehensive metabolic panel; Future  -     Hemoglobin A1C; Future  -     TSH, 3rd generation with Free T4 reflex; Future  -     Microalbumin / creatinine urine ratio; Future    Stage 3a chronic kidney disease ([facility] [address] )          Subjective:      Patient ID: Donal Simons is a 80 y o  male  HPI    He is here with his wife       DM----9/2022 HgA1C 7 0 controlled  He ate more dessert recently  Will try to eat better  Denies hypoglycemia  Neuropathy on feet  FU ophthalmologist Dr Niels Scott in [facility]  Cannot see small print  FU podiatry Dr Rubia Chen in [facility] Q 3 months        Hypothyroidism---He is on levothyroxine 75mcg daily  Feels fine       Live with wife  Does all ADL's  Very active/play golf/gardening/vaccum in house  Denies recent falls  Denies depression           The following portions of the patient's history were reviewed and updated as appropriate: allergies, current medications, past family history, past medical history, past social history, past surgical history and problem list     Review of Systems   Constitutional: Negative for appetite change, chills and fever  HENT: Negative for congestion, ear pain, sinus pain and sore throat  Eyes: Negative for discharge and itching  Respiratory: Negative for apnea, cough, chest tightness, shortness of breath and wheezing  Cardiovascular: Negative for chest pain, palpitations and leg swelling  Gastrointestinal: Negative for abdominal pain, anal bleeding, constipation, diarrhea, nausea and vomiting  Endocrine: Negative for cold intolerance, heat intolerance and polyuria  Genitourinary: Negative for difficulty urinating and dysuria  Musculoskeletal: Negative for arthralgias, back pain and myalgias  Skin: Negative for rash  Neurological: Negative for dizziness and headaches  Psychiatric/Behavioral: Negative for agitation  Objective:      /68 (BP Location: Left arm, Patient Position: Sitting, Cuff Size: Adult)   Pulse 76   Temp 98 2 °F (36 8 °C) (Tympanic)   Resp 16   Ht 5' 10" (1 778 m)   Wt 69 1 kg (152 lb 6 4 oz)   SpO2 96%   BMI 21 87 kg/m²          Physical Exam  Constitutional:       Appearance: He is well-developed  HENT:      Head: Normocephalic and atraumatic  Eyes:      General:         Right eye: No discharge  Left eye: No discharge  Conjunctiva/sclera: Conjunctivae normal    Cardiovascular:      Rate and Rhythm: Normal rate and regular rhythm  Heart sounds: Normal heart sounds  No murmur heard  No friction rub  No gallop  Pulmonary:      Effort: Pulmonary effort is normal  No respiratory distress  Breath sounds: Normal breath sounds  No wheezing or rales  Abdominal:      General: Bowel sounds are normal  There is no distension  Palpations: Abdomen is soft  Tenderness: There is no abdominal tenderness  There is no guarding  Musculoskeletal:         General: Normal range of motion  Cervical back: Normal range of motion and neck supple  No tenderness  Right lower leg: No edema  Left lower leg: No edema  Lymphadenopathy:      Cervical: No cervical adenopathy  Neurological:      Mental Status: He is alert

## 2022-09-26 NOTE — ASSESSMENT & PLAN NOTE
Lab Results   Component Value Date    EGFR 52 09/07/2022    EGFR 66 02/22/2022    EGFR 62 08/19/2021    CREATININE 1 23 09/07/2022    CREATININE 1 02 02/22/2022    CREATININE 1 08 08/19/2021     Keep hydrated  Avoid motrin/ibuprofen/aleve NSAIDs nephrotoxic agents

## 2022-10-18 ENCOUNTER — OFFICE VISIT (OUTPATIENT)
Dept: OBGYN CLINIC | Facility: HOSPITAL | Age: 87
End: 2022-10-18
Payer: MEDICARE

## 2022-10-18 ENCOUNTER — HOSPITAL ENCOUNTER (OUTPATIENT)
Dept: RADIOLOGY | Facility: HOSPITAL | Age: 87
Discharge: HOME | End: 2022-10-18
Attending: ORTHOPAEDIC SURGERY
Payer: MEDICARE

## 2022-10-18 VITALS
HEART RATE: 89 BPM | WEIGHT: 153 LBS | SYSTOLIC BLOOD PRESSURE: 114 MMHG | HEIGHT: 70 IN | BODY MASS INDEX: 21.9 KG/M2 | DIASTOLIC BLOOD PRESSURE: 69 MMHG

## 2022-10-18 DIAGNOSIS — M25.559 HIP PAIN: Primary | ICD-10-CM

## 2022-10-18 DIAGNOSIS — M25.559 HIP PAIN: ICD-10-CM

## 2022-10-18 DIAGNOSIS — M17.0 PRIMARY OSTEOARTHRITIS OF BOTH KNEES: ICD-10-CM

## 2022-10-18 PROCEDURE — 72170 X-RAY EXAM OF PELVIS: CPT

## 2022-10-18 PROCEDURE — 99213 OFFICE O/P EST LOW 20 MIN: CPT | Performed by: PHYSICIAN ASSISTANT

## 2022-10-18 PROCEDURE — 20610 DRAIN/INJ JOINT/BURSA W/O US: CPT | Performed by: PHYSICIAN ASSISTANT

## 2022-10-18 RX ORDER — LIDOCAINE HYDROCHLORIDE 10 MG/ML
2 INJECTION, SOLUTION INFILTRATION; PERINEURAL
Status: COMPLETED | OUTPATIENT
Start: 2022-10-18 | End: 2022-10-18

## 2022-10-18 RX ORDER — BUPIVACAINE HYDROCHLORIDE 2.5 MG/ML
2 INJECTION, SOLUTION INFILTRATION; PERINEURAL
Status: COMPLETED | OUTPATIENT
Start: 2022-10-18 | End: 2022-10-18

## 2022-10-18 RX ORDER — BETAMETHASONE SODIUM PHOSPHATE AND BETAMETHASONE ACETATE 3; 3 MG/ML; MG/ML
12 INJECTION, SUSPENSION INTRA-ARTICULAR; INTRALESIONAL; INTRAMUSCULAR; SOFT TISSUE
Status: COMPLETED | OUTPATIENT
Start: 2022-10-18 | End: 2022-10-18

## 2022-10-18 RX ADMIN — LIDOCAINE HYDROCHLORIDE 2 ML: 10 INJECTION, SOLUTION INFILTRATION; PERINEURAL at 08:59

## 2022-10-18 RX ADMIN — BETAMETHASONE SODIUM PHOSPHATE AND BETAMETHASONE ACETATE 12 MG: 3; 3 INJECTION, SUSPENSION INTRA-ARTICULAR; INTRALESIONAL; INTRAMUSCULAR; SOFT TISSUE at 08:59

## 2022-10-18 RX ADMIN — BUPIVACAINE HYDROCHLORIDE 2 ML: 2.5 INJECTION, SOLUTION INFILTRATION; PERINEURAL at 08:59

## 2022-10-18 NOTE — PROGRESS NOTES
Subjective;     77-year-old male patient, with bilateral knee osteoarthritis, who achieve symptomatic improvement by periodic injections     also he feels that his knees limit him from putting his shoes and socks on  It is noticed in the office that he has decreased range of motion of his hips both sides right greater than left     due to this x-rays AP pelvis were performed    Past Medical History:   Diagnosis Date   • Abnormal loss of weight     last assessed: 4/24/13   • Diverticulitis    • Diverticulosis        Past Surgical History:   Procedure Laterality Date   • APPENDECTOMY      Age 12   • CATARACT EXTRACTION      right 5/2002, left 12/2003   • COLON SURGERY      for diverticulitis   • COLONOSCOPY      done 5/5/2009, mild diverticulosis noted, recheck in 3 years   • INNER EAR SURGERY Left     Cochlear device implantation; resolved: Sep 2012   • SHOULDER SURGERY  05/2007    right rotator cuff       Family History   Problem Relation Age of Onset   • Coronary artery disease Sister    • Diabetes Sister    • Diabetes Daughter        Social History     Tobacco Use   • Smoking status: Never Smoker   • Smokeless tobacco: Never Used   Substance Use Topics   • Alcohol use: No     Comment: social drinker as per [employer]   • Drug use: No      exam;     adult male in no acute distress   he presents the office alone   hip range of motion is limited in regards to internal rotation right greater than left hip   external rotation of the hips do not allow the ankle or heel of either leg to go past the mid calf,  Therefore the inability for him to do his shoes and socks   his knees have no discoloration no effusion   his knees demonstrate no redness no palpable warmth     he has medial greater than lateral compartment tenderness to palpation     x-rays; AP pelvis,  Shows degenerative changes of his hips which are age appropriate and not severe    Large joint arthrocentesis: R knee  Universal Protocol:  Consent given by: patient    Supporting Documentation  Indications: pain   Procedure Details  Location: knee - R knee  Needle size: 22 G  Ultrasound guidance: no  Medications administered: 2 mL bupivacaine 0 25 %; 2 mL lidocaine 1 %; 12 mg betamethasone acetate-betamethasone sodium phosphate 6 (3-3) mg/mL      Large joint arthrocentesis: L knee  Universal Protocol:  Consent given by: patient    Supporting Documentation  Indications: pain   Procedure Details  Location: knee - L knee  Needle size: 22 G  Medications administered: 2 mL bupivacaine 0 25 %; 2 mL lidocaine 1 %; 12 mg betamethasone acetate-betamethasone sodium phosphate 6 (3-3) mg/mL         impression;     bilateral knee osteoarthritis   bilateral hip osteoarthritis   inability to perform ADLs secondary to hip motion     plan;     his wife is receiving surgery December 19th of this year     he was offered and accepted injections of steroid to both knees  he will be ordered for steroid injections to both hips   pending completion of his hip injections physical therapy for restoration range of motion should occur at [facility] and [address]     this concluded his experience  It was my privilege to assist him in care and at his expressed permission

## 2022-10-20 ENCOUNTER — TELEPHONE (OUTPATIENT)
Dept: OBGYN CLINIC | Facility: HOSPITAL | Age: 87
End: 2022-10-20

## 2022-10-20 NOTE — TELEPHONE ENCOUNTER
EL for pt informing him that FL injections for left/right hips are don't require an auth, and he could move forward with them  Any questions to return my call

## 2022-10-25 NOTE — NURSING NOTE
Call placed to patient to discuss upcoming appointment at [facility] radiology department and complete consultation with patient's wife Elvin Saul, patient is [facility]  Patient is having bilateral hip CSI utilizing fluoroscopy  guidance  Reviewed patient's allergies, no current anticoagulant medication present , also discussed the pre and post procedure expectations  Reminded patient of location and time expected for procedure, Patient expressed understanding by verbalizing and repeating instructions

## 2022-11-01 ENCOUNTER — HOSPITAL ENCOUNTER (OUTPATIENT)
Dept: RADIOLOGY | Facility: HOSPITAL | Age: 87
Discharge: HOME | End: 2022-11-01

## 2022-11-01 DIAGNOSIS — M25.559 HIP PAIN: ICD-10-CM

## 2022-11-01 RX ORDER — METHYLPREDNISOLONE ACETATE 80 MG/ML
80 INJECTION, SUSPENSION INTRA-ARTICULAR; INTRALESIONAL; INTRAMUSCULAR; SOFT TISSUE
Status: COMPLETED | OUTPATIENT
Start: 2022-11-01 | End: 2022-11-01

## 2022-11-01 RX ORDER — BUPIVACAINE HYDROCHLORIDE 2.5 MG/ML
10 INJECTION, SOLUTION EPIDURAL; INFILTRATION; INTRACAUDAL
Status: COMPLETED | OUTPATIENT
Start: 2022-11-01 | End: 2022-11-01

## 2022-11-01 RX ORDER — LIDOCAINE HYDROCHLORIDE 10 MG/ML
10 INJECTION, SOLUTION EPIDURAL; INFILTRATION; INTRACAUDAL; PERINEURAL
Status: COMPLETED | OUTPATIENT
Start: 2022-11-01 | End: 2022-11-01

## 2022-11-01 RX ADMIN — BUPIVACAINE HYDROCHLORIDE 6 ML: 2.5 INJECTION, SOLUTION EPIDURAL; INFILTRATION; INTRACAUDAL; PERINEURAL at 12:30

## 2022-11-01 RX ADMIN — IOHEXOL 3 ML: 300 INJECTION, SOLUTION INTRAVENOUS at 12:30

## 2022-11-01 RX ADMIN — METHYLPREDNISOLONE ACETATE 80 MG: 80 INJECTION, SUSPENSION INTRA-ARTICULAR; INTRALESIONAL; INTRAMUSCULAR; SOFT TISSUE at 12:30

## 2022-11-01 RX ADMIN — LIDOCAINE HYDROCHLORIDE 7 ML: 10 INJECTION, SOLUTION EPIDURAL; INFILTRATION; INTRACAUDAL; PERINEURAL at 12:30

## 2022-11-09 ENCOUNTER — EVALUATION (OUTPATIENT)
Dept: PHYSICAL THERAPY | Facility: CLINIC | Age: 87
End: 2022-11-09

## 2022-11-09 DIAGNOSIS — M25.651 IMPAIRED RANGE OF MOTION OF BOTH HIPS: Primary | ICD-10-CM

## 2022-11-09 DIAGNOSIS — M25.559 HIP PAIN: ICD-10-CM

## 2022-11-09 DIAGNOSIS — R26.9 ABNORMALITY OF GAIT: ICD-10-CM

## 2022-11-09 DIAGNOSIS — R26.89 BALANCE DISORDER: ICD-10-CM

## 2022-11-09 DIAGNOSIS — M25.652 IMPAIRED RANGE OF MOTION OF BOTH HIPS: Primary | ICD-10-CM

## 2022-11-09 NOTE — PROGRESS NOTES
PT Evaluation     Today's date: 2022  Patient name: Mily Robertson  : 1935  MRN: [id number]  Referring provider: Brittany Marin  Dx:   Encounter Diagnosis     ICD-10-CM    1  Impaired range of motion of both hips  M25 651     M25 652    2  Hip pain  M25 559 Ambulatory Referral to Physical Therapy   3  Abnormality of gait  R26 9    4  Balance disorder  R26 89        Start Time: 745  Stop Time: 830  Total time in clinic (min): 45 minutes    Assessment  Assessment details: Patient is a 80 y o  male presenting to initial examination with chief complaint of B/L hip stiffness  Signs and symptoms are consistent with degeneration changes of B/L hips  Primary impairments include impaired B/L hip ROM, especially in IR and ER as well as B/L hip musculature tightness (hamstrings/quads/ITB/piriformis)  As a result of impairments patient experiences limitations with functional/daily activities including fair endurance and difficulty with activity that requires [facility] ROM e g  donning socks/shoes on  Pt also c/o having significant muscle cramping in his BLEs, especially in calves  Discussed importance of monitoring hydration and that if it continues to be an issue, he needs to discuss with PCP if it is something other than hydration issues  Educated patient regarding plan of care and answered all patient questions to patient satisfaction  Patient would benefit from skilled PT interventions to address above impairments in order to maximize functional capacity   Thank you for the referral     Impairments: abnormal coordination, abnormal gait, abnormal muscle firing, abnormal muscle tone, abnormal or restricted ROM, abnormal movement, activity intolerance, impaired balance, impaired physical strength, lacks appropriate home exercise program, pain with function, weight-bearing intolerance and poor posture   Understanding of Dx/Px/POC: good   Prognosis: good    Goals  Functional Goals: by discharge (4-6 weeks)  - Patient to discharge to independent HEP  - Patient to increase FOTO to at least 65 for improved overall functional mobility    - Patient to have improved BLE flexibility by at least 25% for improved overall functional mobility and ease with donning/shoes on/off  - Patient to have improved B/L hip ROM in IR/ER for improved ease with mobility    -Patient to have improved balance by being able to complete heel/toe tandem stance >20-30" without LOB to reduce risk of falls  Plan  Patient would benefit from: skilled physical therapy  Referral necessary: Yes  Planned modality interventions: cryotherapy and thermotherapy: hydrocollator packs  Planned therapy interventions: joint mobilization, manual therapy, motor coordination training, neuromuscular re-education, orthotic fitting/training, patient education, postural training, strengthening, stretching, therapeutic activities, therapeutic exercise, therapeutic training, flexibility, functional ROM exercises, gait training, home exercise program, body mechanics training, behavior modification, balance/weight bearing training, balance, activity modification and abdominal trunk stabilization  Frequency: 2x week  Duration in visits: 16  Duration in weeks: 8  Plan of Care beginning date: 11/9/2022  Plan of Care expiration date: 12/28/2022  Treatment plan discussed with: patient        Subjective Evaluation    History of Present Illness  Mechanism of injury: 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)   HOME LIFE: lives in bi-level home with wife  HOBBIES/EXERCISE: golf   PLOF:  Pt recalls getting an injection in 1 hip a few years ago  HISTORY OF CURRENT INJURY:  Pt reports he doesn't really have hip pain  Pt notes that he notices that his balance was off a few months ago, along with stiffness in his hips/legs   Pt was already getting steroid injections in his knees every 3 months and his doctor also assessed his hips and did xrays, showing arthritis  Also had injections in his hips  Pt referred to OP PT for PT eval and treat for hip ROM  Pt is having difficulty donning socks/shoes on  Pt also has chief complaint of cramping mainly at night  Pt unsure of how much fluids he is drinking as doctor also mentioned possible dehydration  Pt reports of having an episode of his R knee buckling one time while walking and then he felt fine 2 days later walking  Pt denied having any cardiac or circulation issues  PAIN LOCATION/DESCRIPTORS: Pt reports he has no pain  AGGRAVATING FACTORS:  No aggravating factors noted  However, difficulty with movement  IMAGING:  Xrays taken, showing arthritis   SPECIAL QUESTIONS:    Pt does report of having neuropathy in B/L feet and also has diabetes type 2     PATIENT GOALS: improve balance     Pain  Current pain ratin      Diagnostic Tests  X-ray: abnormal (arthritis)  Treatments  Previous treatment: injection treatment  Current treatment: physical therapy  Patient Goals  Patient goals for therapy: increased strength, independence with ADLs/IADLs, return to sport/leisure activities, increased motion and improved balance          Objective     Active Range of Motion     Lumbar   Flexion:  Restriction level: moderate  Extension:  Restriction level: moderate  Left lateral flexion:  Restriction level: moderate  Right lateral flexion:  Restriction level: moderate  Left rotation:  Restriction level: moderate  Right rotation:  Restriction level: moderate  Left Hip   Flexion: [facility]  External rotation (90/90): 15 degrees   Internal rotation (90/90): 15 degrees     Right Hip   Flexion: with pain  External rotation (90/90): 25 degrees   Internal rotation (90/90): 15 degrees     Strength/Myotome Testing     Left Hip   Planes of Motion   Flexion: 4+  Abduction: 4+  Adduction: 4+  External rotation: 4  Internal rotation: 4    Right Hip   Planes of Motion   Flexion: 4+  Abduction: 4+  Adduction: 4+  External rotation: 4  Internal rotation: 4    Left Knee   Flexion: 4+  Extension: 4+    Right Knee   Flexion: 4+  Extension: 4+    Left Ankle/Foot   Dorsiflexion: 4+    Right Ankle/Foot   Dorsiflexion: 4+    General Comments:      Lumbar Comments  Hamstrings:  R: 75 degrees  L: 70 degrees    (+) Hip flexor and ITB tightness noted B/L     Neuro Exam:     Functional outcomes   5x sit to stand: 12 (seconds)  Functional outcome comment: NBOS EO - good  NBOS EC - fair plus with mild sway but stable  Heel/toe tandem stance - fair minus with mild/mod sway with SBA/CGA              Diagnosis: impaired B/L hip ROM, gait/balance dysfunction   Precautions: type 2 DM, h/o R RTC repair 2007, neuropathy B/L feet   Primary impairments: decreased B/L hip IR/ER ROM, BLE tightness, balance deficits   *asterisks by exercise = given for HEP    11/9       Manuals        BLE stretching (hamstrings/ITB/piriformis/hip flexors)        Prone hip IR/ER stretching/PROM                                There Ex        Bike        Postural rows/ext with core brace        Supine core brace        Supine bridge with add squeeze        S/L clamshells                                        Neuro Re-Ed        NBOS on Airex with EC (SBA/CGA)        Heel/toe tandem stance        Alt step taps         sidestepping        Retro walking        F/L stepover cones                                                        Re-evaluation             Ther Act/Gait              HEP instruction                            Modalities

## 2022-11-15 ENCOUNTER — OFFICE VISIT (OUTPATIENT)
Dept: PHYSICAL THERAPY | Facility: CLINIC | Age: 87
End: 2022-11-15

## 2022-11-15 DIAGNOSIS — M79.604 PAIN IN BOTH LOWER EXTREMITIES: Primary | ICD-10-CM

## 2022-11-15 DIAGNOSIS — M79.605 PAIN IN BOTH LOWER EXTREMITIES: Primary | ICD-10-CM

## 2022-11-15 DIAGNOSIS — M25.651 IMPAIRED RANGE OF MOTION OF BOTH HIPS: Primary | ICD-10-CM

## 2022-11-15 DIAGNOSIS — R26.9 ABNORMALITY OF GAIT: ICD-10-CM

## 2022-11-15 DIAGNOSIS — R26.89 BALANCE DISORDER: ICD-10-CM

## 2022-11-15 DIAGNOSIS — M25.559 HIP PAIN: ICD-10-CM

## 2022-11-15 DIAGNOSIS — M25.652 IMPAIRED RANGE OF MOTION OF BOTH HIPS: Primary | ICD-10-CM

## 2022-11-15 NOTE — PROGRESS NOTES
Wife is here for her appt. Wife state pt has leg cramping/pain almost every night for a while. No swollen or redness. Would like to check circulation. Give script.

## 2022-11-15 NOTE — PROGRESS NOTES
Daily Note     Today's date: 11/15/2022  Patient name: Rodrigo Campos  : 1935  MRN: [id number]  Referring provider: Noel Hoover  Dx:   Encounter Diagnosis     ICD-10-CM    1  Impaired range of motion of both hips  M25 651     M25 652    2  Hip pain  M25 559    3  Abnormality of gait  R26 9    4  Balance disorder  R26 89                   Subjective: " I had a cramp in my leg the whole night" " I felt fine after last time"       Objective: See treatment diary below      Assessment: Catrina Keith presents to therapy with hip mobility and strength deficits  Noted minimal pain upon arrival  Focused on balance and stability today  Noted increased balance deficits with NBOS movement  Decreased hamstring mobility noted throughout  Tolerated session without adverse effects  Recommend continued skilled therapy to improve overall strength and mobility for functional return with decreased compensation and pain  Plan: Continue as per POC        Diagnosis: impaired B/L hip ROM, gait/balance dysfunction   Precautions: type 2 DM, h/o R RTC repair , neuropathy B/L feet   Primary impairments: decreased B/L hip IR/ER ROM, BLE tightness, balance deficits   *asterisks by exercise = given for HEP    11/9 11/15      Manuals        BLE stretching (hamstrings/ITB/piriformis/hip flexors)        Prone hip IR/ER stretching/PROM                                There Ex  11/15      Bike  5'       Postural rows/ext with core brace        Supine core brace        Supine bridge with add squeeze        S/L clamshells        HR/TR  20      SLR 3 ways   20ea      Hamstring stretch   5x10" B               Neuro Re-Ed  11/15      NBOS on Airex with EC (SBA/CGA)        Heel/toe tandem stance  2x30" B       Alt step taps   x15 ea      sidestepping        Retro walking        F/L stepover cones        Sit<> stands         Sidestepping/ tandem walking   2 laps ea                                      Re-evaluation             Ther Act/Gait 11/15          HEP instruction     KR-complete supine exercises NV                       Modalities

## 2022-11-17 ENCOUNTER — OFFICE VISIT (OUTPATIENT)
Dept: PHYSICAL THERAPY | Facility: CLINIC | Age: 87
End: 2022-11-17

## 2022-11-17 DIAGNOSIS — M25.559 HIP PAIN: ICD-10-CM

## 2022-11-17 DIAGNOSIS — M25.652 IMPAIRED RANGE OF MOTION OF BOTH HIPS: Primary | ICD-10-CM

## 2022-11-17 DIAGNOSIS — M25.651 IMPAIRED RANGE OF MOTION OF BOTH HIPS: Primary | ICD-10-CM

## 2022-11-17 NOTE — PROGRESS NOTES
Daily Note     Today's date: 2022  Patient name: Nichelle Dudley  : 1935  MRN: [id number]  Referring provider: Frank Dos Santos PA-C  Dx: No diagnosis found  Subjective: Reports that he is having more balance problems than hip pain recently  Feels that he is very unsteady on his feet especially when doing tandem activities, he has been practicing them while at home where he has UE support  Objective: See treatment diary below      Assessment: Tolerated treatment well  Good performance and form for supine hip and knee strengthening exercises  High difficulty with narrow base of support  Patient demonstrated fatigue post treatment and would benefit from continued PT      Plan: Continue per plan of care  Progress treatment as tolerated  Continue narrow base of support progressions in conjunction with BLE generalized strengthening program, consider proprioception and sensation testing to b/l feet secondary to history of T2 DM  1-on-1 time  [phone]  [phone]   [phone]  29 mins one-one-one time      Diagnosis: impaired B/L hip ROM, gait/balance dysfunction   Precautions: type 2 DM, h/o R RTC repair , neuropathy B/L feet   Primary impairments: decreased B/L hip IR/ER ROM, BLE tightness, balance deficits   *asterisks by exercise = given for HEP    11/9 11/15 11/17     Manuals        BLE stretching (hamstrings/ITB/piriformis/hip flexors)        Prone hip IR/ER stretching/PROM                                There Ex  11/15      Bike  5'  8'     Postural rows/ext with core brace        Supine core brace        Supine bridge with add squeeze        S/L clamshells        HR/TR  20      SLR 3 ways   20ea 20ea supine 2# AW BL      Hamstring stretch   5x10" B  3x30" B/L seated     LAQ   2x15 #2 BAW     Neuro Re-Ed  11/15      NBOS on Airex with EC (SBA/CGA)        Heel/toe tandem stance  2x30" B  3x30" B/L      Alt step taps   x15 ea x30 ea - 12" jalil        sidestepping   3x laps ea (8' no UE support)      Retro walking        F/L stepover cones        Sit<> stands         Sidestepping/ tandem walking   2 laps ea 3 laps ea     Hurdles   3 hurdles (6")   3x laps fwd/lat no UE support (CGAx1)                             Re-evaluation             Ther Act/Gait    11/15          HEP instruction     KR-complete supine exercises NV                       Modalities

## 2022-11-20 DIAGNOSIS — E03.9 ACQUIRED HYPOTHYROIDISM: ICD-10-CM

## 2022-11-20 RX ORDER — LEVOTHYROXINE SODIUM 0.07 MG/1
TABLET ORAL
Qty: 90 TABLET | Refills: 3 | Status: SHIPPED | OUTPATIENT
Start: 2022-11-20

## 2022-11-22 ENCOUNTER — OFFICE VISIT (OUTPATIENT)
Dept: PHYSICAL THERAPY | Facility: CLINIC | Age: 87
End: 2022-11-22

## 2022-11-22 DIAGNOSIS — M25.651 IMPAIRED RANGE OF MOTION OF BOTH HIPS: Primary | ICD-10-CM

## 2022-11-22 DIAGNOSIS — R26.9 ABNORMALITY OF GAIT: ICD-10-CM

## 2022-11-22 DIAGNOSIS — R26.89 BALANCE DISORDER: ICD-10-CM

## 2022-11-22 DIAGNOSIS — M25.559 HIP PAIN: ICD-10-CM

## 2022-11-22 DIAGNOSIS — M25.652 IMPAIRED RANGE OF MOTION OF BOTH HIPS: Primary | ICD-10-CM

## 2022-11-22 NOTE — PROGRESS NOTES
Daily Note     Today's date: 2022  Patient name: Eitan Dimas  : 1935  MRN: [id number]  Referring provider: Milind Daniels  Dx:   Encounter Diagnosis     ICD-10-CM    1  Impaired range of motion of both hips  M25 651     M25 652       2  Hip pain  M25 559       3  Abnormality of gait  R26 9       4  Balance disorder  R26 89                      Subjective: Patient noted no new complaints  Patient noted that he has been  practicing tandem walking at home without holding on  Objective: See treatment diary below      Assessment: Tolerated treatment fair  Patient exhibited good technique with therapeutic exercises and would benefit from continued PT      Plan: Continue per plan of care  Diagnosis: impaired B/L hip ROM, gait/balance dysfunction   Precautions: type 2 DM, h/o R RTC repair , neuropathy B/L feet   Primary impairments: decreased B/L hip IR/ER ROM, BLE tightness, balance deficits   *asterisks by exercise = given for HEP    11/9 11/15 11/17 11/22    Manuals        BLE stretching (hamstrings/ITB/piriformis/hip flexors)        Prone hip IR/ER stretching/PROM                                There Ex  11/15      Bike  5'  8' 8'    Postural rows/ext with core brace        Supine core brace        Supine bridge with add squeeze        S/L clamshells        HR/TR  20      SLR 3 ways   20ea 20ea supine 2# AW BL   20 x supine 2# AW BL     Hamstring stretch   5x10" B  3x30" B/L seated 3x30" B/L seated    LAQ   2x15 #2 BAW 2x 10 2# BAW    Neuro Re-Ed  11/15      NBOS on Airex with EC (SBA/CGA)        Heel/toe tandem stance  2x30" B  3x30" B/L  3 x 30" B/L    Alt step taps   x15 ea x30 ea - 12" jalil        sidestepping   3x laps ea (8' no UE support)   3 laps     Retro walking        F/L stepover cones        Sit<> stands         Sidestepping/ tandem walking   2 laps ea 3 laps ea 3 laps     Hurdles   3 hurdles (6")   3x laps fwd/lat no UE support (CGAx1) 3 hurdles (6")   3x laps fwd/lat no UE support (CGAx1                            Re-evaluation             Ther Act/Gait    11/15          HEP instruction     KR-complete supine exercises NV                       Modalities

## 2022-11-29 ENCOUNTER — OFFICE VISIT (OUTPATIENT)
Dept: PHYSICAL THERAPY | Facility: CLINIC | Age: 87
End: 2022-11-29

## 2022-11-29 DIAGNOSIS — R26.9 ABNORMALITY OF GAIT: ICD-10-CM

## 2022-11-29 DIAGNOSIS — M25.652 IMPAIRED RANGE OF MOTION OF BOTH HIPS: Primary | ICD-10-CM

## 2022-11-29 DIAGNOSIS — M25.559 HIP PAIN: ICD-10-CM

## 2022-11-29 DIAGNOSIS — R26.89 BALANCE DISORDER: ICD-10-CM

## 2022-11-29 DIAGNOSIS — M25.651 IMPAIRED RANGE OF MOTION OF BOTH HIPS: Primary | ICD-10-CM

## 2022-11-29 NOTE — PROGRESS NOTES
Daily Note     Today's date: 2022  Patient name: Sagar Catalan  : 1935  MRN: [id number]  Referring provider: Janet Garner  Dx:   Encounter Diagnosis     ICD-10-CM    1  Impaired range of motion of both hips  M25 651     M25 652       2  Hip pain  M25 559       3  Abnormality of gait  R26 9       4  Balance disorder  R26 89                      Subjective: " I am a little achy from my cold"     Objective: See treatment diary below      Assessment: Issac Jarvis presents to therapy with balance and gait deficits  Focused on standing balance both dynamic and static increased fatigue noted with prolonged SLS activity  Increased fatigue today as patient is getting over an illness  Rest breaks provided throughout  Tolerated session without adverse effects  Recommend continued skilled therapy to improve overall strength and mobility for functional return with decreased compensation and pain  Plan: Continue per plan of care  Diagnosis: impaired B/L hip ROM, gait/balance dysfunction   Precautions: type 2 DM, h/o R RTC repair , neuropathy B/L feet   Primary impairments: decreased B/L hip IR/ER ROM, BLE tightness, balance deficits   *asterisks by exercise = given for HEP    11/9 11/15 11/17 11/22 11/29   Manuals        BLE stretching (hamstrings/ITB/piriformis/hip flexors)        Prone hip IR/ER stretching/PROM                                There Ex  11/15   11/29   Bike  5'  8' 8' 8'   Postural rows/ext with core brace        Supine core brace        Supine bridge with add squeeze        S/L clamshells        HR/TR  20   20 ea   SLR 3 ways   20ea 20ea supine 2# AW BL   20 x supine 2# AW BL  Standing 2# 2x10 ea    Hamstring stretch   5x10" B  3x30" B/L seated 3x30" B/L seated    LAQ   2x15 #2 BAW 2x 10 2# BAW    Neuro Re-Ed  11/15   11/29   NBOS on Airex with EC (SBA/CGA)        Heel/toe tandem stance  2x30" B  3x30" B/L  3 x 30" B/L    Alt step taps   x15 ea x30 ea - 12" jalil        sidestepping 3x laps ea (8' no UE support)   3 laps  4 laps 2# AW   Retro walking     3 laps 1 UE    F/L stepover cones        Sit<> stands         marching/ tandem walking   2 laps ea 3 laps ea 3 laps  3 laps 2# AW ea   Hurdles   3 hurdles (6")   3x laps fwd/lat no UE support (CGAx1) 3 hurdles (6")   3x laps fwd/lat no UE support (CGAx1                            Re-evaluation             Ther Act/Gait    11/15      11/29    HEP instruction     KR-complete supine exercises NV                       Modalities

## 2022-12-01 ENCOUNTER — APPOINTMENT (OUTPATIENT)
Dept: PHYSICAL THERAPY | Facility: CLINIC | Age: 87
End: 2022-12-01

## 2022-12-06 ENCOUNTER — OFFICE VISIT (OUTPATIENT)
Dept: PHYSICAL THERAPY | Facility: CLINIC | Age: 87
End: 2022-12-06

## 2022-12-06 DIAGNOSIS — M25.651 IMPAIRED RANGE OF MOTION OF BOTH HIPS: Primary | ICD-10-CM

## 2022-12-06 DIAGNOSIS — R26.9 ABNORMALITY OF GAIT: ICD-10-CM

## 2022-12-06 DIAGNOSIS — R26.89 BALANCE DISORDER: ICD-10-CM

## 2022-12-06 DIAGNOSIS — M25.652 IMPAIRED RANGE OF MOTION OF BOTH HIPS: Primary | ICD-10-CM

## 2022-12-06 DIAGNOSIS — M25.559 HIP PAIN: ICD-10-CM

## 2022-12-06 NOTE — PROGRESS NOTES
Daily Note     Today's date: 2022  Patient name: Opal Jorgensen  : 1935  MRN: [id number]  Referring provider: Paloma River  Dx:   Encounter Diagnosis     ICD-10-CM    1  Impaired range of motion of both hips  M25 651     M25 652       2  Hip pain  M25 559       3  Abnormality of gait  R26 9       4  Balance disorder  R26 89                      Subjective: " I still can't shake this cold"     Objective: See treatment diary below      Assessment: Marcella Rosales presents to therapy with balance and gait deficits  Continued with LE balance in standing to facilitate improved activation and balance- decreased use of UE noted today  Improved upright positioning noted with standing activity  Improved activation noted with static balance  Recommend continued skilled therapy to improve overall strength and mobility for functional return with decreased compensation and pain  Plan: Continue per plan of care        Diagnosis: impaired B/L hip ROM, gait/balance dysfunction   Precautions: type 2 DM, h/o R RTC repair , neuropathy B/L feet   Primary impairments: decreased B/L hip IR/ER ROM, BLE tightness, balance deficits   *asterisks by exercise = given for HEP    12/6 11/15 11/17 11/22 11/29   Manuals        BLE stretching (hamstrings/ITB/piriformis/hip flexors)        Prone hip IR/ER stretching/PROM                                There Ex 12/6 11/15   11/29   Bike 8' 5'  8' 8' 8'   Postural rows/ext with core brace x30 ea 10#       Supine core brace        Supine bridge with add squeeze        S/L clamshells        HR/TR 30 ea 20   20 ea   SLR 3 ways  30ea standing 2# 20ea 20ea supine 2# AW BL   20 x supine 2# AW BL  Standing 2# 2x10 ea    Hamstring stretch   5x10" B  3x30" B/L seated 3x30" B/L seated    LAQ   2x15 #2 BAW 2x 10 2# BAW    Neuro Re-Ed 12/6 11/15   11/29   NBOS on Airex with EC (SBA/CGA)        Heel/toe tandem stance 2x30" B 2x30" B  3x30" B/L  3 x 30" B/L    Alt step taps   x15 ea x30 ea - 12" jalil        sidestepping 4 laps 2# AW   3x laps ea (8' no UE support)   3 laps  4 laps 2# AW   Retro walking 3 laps 1 UE    3 laps 1 UE    F/L stepover cones        Sit<> stands         marching/ tandem walking  3 laps ea  2# w/ marching 2 laps ea 3 laps ea 3 laps  3 laps 2# AW ea   Hurdles   3 hurdles (6")   3x laps fwd/lat no UE support (CGAx1) 3 hurdles (6")   3x laps fwd/lat no UE support (CGAx1                            Re-evaluation             Ther Act/Gait  12/6  11/15      11/29    HEP instruction     KR-complete supine exercises NV                       Modalities

## 2022-12-08 ENCOUNTER — OFFICE VISIT (OUTPATIENT)
Dept: PHYSICAL THERAPY | Facility: CLINIC | Age: 87
End: 2022-12-08

## 2022-12-08 DIAGNOSIS — M25.652 IMPAIRED RANGE OF MOTION OF BOTH HIPS: Primary | ICD-10-CM

## 2022-12-08 DIAGNOSIS — M25.651 IMPAIRED RANGE OF MOTION OF BOTH HIPS: Primary | ICD-10-CM

## 2022-12-08 DIAGNOSIS — R26.89 BALANCE DISORDER: ICD-10-CM

## 2022-12-08 DIAGNOSIS — R26.9 ABNORMALITY OF GAIT: ICD-10-CM

## 2022-12-08 DIAGNOSIS — M25.559 HIP PAIN: ICD-10-CM

## 2022-12-08 NOTE — PROGRESS NOTES
Daily Note     Today's date: 2022  Patient name: Rodrigo Campos  : 1935  MRN: [id number]  Referring provider: Noel Hoover  Dx:   Encounter Diagnosis     ICD-10-CM    1  Impaired range of motion of both hips  M25 651     M25 652       2  Hip pain  M25 559       3  Abnormality of gait  R26 9       4  Balance disorder  R26 89           Start Time: 30  Stop Time: 0815  Total time in clinic (min): 45 minutes    Subjective: Pt reports he is feeling good today, no c/o symptoms     Objective: See treatment diary below      Assessment: Pt tolerated today's session well with no adverse symptoms  Pt continues to show mild unsteadiness with standing balance activities  Pt shows good challenge with LE strengthening exercises  Pt will benefit from further skilled PT to increase strength, flexibility and function  Continue to progress as able  Plan: Continue per plan of care        Diagnosis: impaired B/L hip ROM, gait/balance dysfunction   Precautions: type 2 DM, h/o R RTC repair , neuropathy B/L feet   Primary impairments: decreased B/L hip IR/ER ROM, BLE tightness, balance deficits   *asterisks by exercise = given for HEP       Manuals        BLE stretching (hamstrings/ITB/piriformis/hip flexors)        Prone hip IR/ER stretching/PROM                                There Ex    Bike 8' 8'  8' 8'   Postural rows/ext with core brace x30 ea 10#       Supine core brace  2x10      Supine bridge with add squeeze  2x10      S/L clamshells  2x10      HR/TR 30 ea 30x ea   20 ea   SLR 3 ways  30ea standing 2# 30ea standing 2#   20 x supine 2# AW BL  Standing 2# 2x10 ea    Hamstring stretch     3x30" B/L seated    LAQ    2x 10 2# BAW    Neuro Re-Ed    NBOS on Airex with EC (SBA/CGA)        Heel/toe tandem stance 2x30" B 2x30" B  3 x 30" B/L    Alt step taps         sidestepping 4 laps 2# AW  4 laps 2# AW    3 laps  4 laps 2# AW   Retro walking 3 laps 1 UE 3 laps 1 UE   3 laps 1 UE    F/L stepover cones        Sit<> stands         marching/ tandem walking  3 laps ea  2# w/ marching 3 laps ea  2# w/ marching  3 laps  3 laps 2# AW ea   Hurdles    3 hurdles (6")   3x laps fwd/lat no UE support (CGAx1                            Re-evaluation           Ther Act/Gait  12/6 12/8 11/29    HEP instruction                        Modalities   12/8

## 2022-12-13 ENCOUNTER — APPOINTMENT (OUTPATIENT)
Dept: PHYSICAL THERAPY | Facility: CLINIC | Age: 87
End: 2022-12-13

## 2022-12-15 ENCOUNTER — APPOINTMENT (OUTPATIENT)
Dept: PHYSICAL THERAPY | Facility: CLINIC | Age: 87
End: 2022-12-15

## 2022-12-20 ENCOUNTER — APPOINTMENT (OUTPATIENT)
Dept: PHYSICAL THERAPY | Facility: CLINIC | Age: 87
End: 2022-12-20

## 2022-12-22 ENCOUNTER — APPOINTMENT (OUTPATIENT)
Dept: PHYSICAL THERAPY | Facility: CLINIC | Age: 87
End: 2022-12-22

## 2022-12-27 ENCOUNTER — APPOINTMENT (OUTPATIENT)
Dept: PHYSICAL THERAPY | Facility: CLINIC | Age: 87
End: 2022-12-27

## 2022-12-29 ENCOUNTER — EVALUATION (OUTPATIENT)
Dept: PHYSICAL THERAPY | Facility: CLINIC | Age: 87
End: 2022-12-29

## 2022-12-29 DIAGNOSIS — R26.89 BALANCE DISORDER: ICD-10-CM

## 2022-12-29 DIAGNOSIS — M25.651 IMPAIRED RANGE OF MOTION OF BOTH HIPS: Primary | ICD-10-CM

## 2022-12-29 DIAGNOSIS — M25.652 IMPAIRED RANGE OF MOTION OF BOTH HIPS: Primary | ICD-10-CM

## 2022-12-29 DIAGNOSIS — R26.9 ABNORMALITY OF GAIT: ICD-10-CM

## 2022-12-29 DIAGNOSIS — M25.559 HIP PAIN: ICD-10-CM

## 2022-12-29 NOTE — PROGRESS NOTES
PT Re-Evaluation     Today's date: 2022  Patient name: Raphael Sanchez  : 1935  MRN: [id number]  Referring provider: Ruby John  Dx:   Encounter Diagnosis     ICD-10-CM    1  Impaired range of motion of both hips  M25 651     M25 652       2  Abnormality of gait  R26 9       3  Hip pain  M25 559       4  Balance disorder  R26 89           Start Time: 0732          Assessment  Assessment details: Patient is a 80 y o  male presenting to initial examination with chief complaint of B/L hip stiffness  Presents to therapy following ~ 2 months of therapy  Notes improvement in hip pain and sway with walking but continues to reports balance/ dizziness deficits with mobility  Slight AROM improvement noted in lumbar spine with extension/ rotation  MMT indicates continued gross LE deficits throughout  FTST was 16 sec indicating increased time required for st <> stands with mild functional LE weakness  Improved tandem stance stability  Tolerated session without adverse effects  Overall demonstrates slight improvement in balance and strength with continued functional mobility  deficits present throughout  Recommend continued skilled tehrapy to improve overall balance and stability for reduced fall risk  Impairments: abnormal coordination, abnormal gait, abnormal muscle firing, abnormal muscle tone, abnormal or restricted ROM, abnormal movement, activity intolerance, impaired balance, impaired physical strength, lacks appropriate home exercise program, pain with function, weight-bearing intolerance and poor posture   Understanding of Dx/Px/POC: good   Prognosis: good    Goals  Functional Goals: by discharge (4-6 weeks)  - Patient to discharge to independent [facility]- PROGRESSING  - Patient to report 0 falls by discharge - NEW  - Patient to have improved BLE flexibility by at least 25% for improved overall functional mobility and ease with donning/shoes on/off   - PROGRESSING maintaining   - Patient to have improved B/L hip ROM in IR/ER for improved ease with mobility  - PROGRESSING to 30 deg   -Pat  ient to have improved balance by being able to complete heel/toe tandem stance >20-30" without LOB to reduce risk of falls- PROGRESSING 30 sec   - Patient will improve FTST to 11 sec for improved functional transfer mobility     Plan  Patient would benefit from: skilled physical therapy  Referral necessary: Yes  Planned modality interventions: cryotherapy and thermotherapy: hydrocollator packs  Planned therapy interventions: joint mobilization, manual therapy, motor coordination training, neuromuscular re-education, orthotic fitting/training, patient education, postural training, strengthening, stretching, therapeutic activities, therapeutic exercise, therapeutic training, flexibility, functional ROM exercises, gait training, home exercise program, body mechanics training, behavior modification, balance/weight bearing training, balance, activity modification and abdominal trunk stabilization  Frequency: 2x week  Duration in weeks: 10  Plan of Care beginning date: 2022  Plan of Care expiration date: 2023  Treatment plan discussed with: patient        Subjective Evaluation    History of Present Illness  Mechanism of injury: 22:Bassam presents to therapy with hip pain/ gait deficits  Darrin Camargo has missed the previous 2 weeks of therapy secondary to outside conditions  Drarin Camargo notes no pain in the hip  Continues to note dizziness/ balance deficits  Notes improvement in walking with less side to side movement  Notes 50% of functional return  Reports 0 falls since beginning therapy       Quality of life: good    Pain  Current pain ratin      Diagnostic Tests  X-ray: abnormal (arthritis)  Treatments  Previous treatment: injection treatment  Current treatment: physical therapy  Patient Goals  Patient goals for therapy: increased strength, independence with ADLs/IADLs, return to sport/leisure activities, increased motion and improved balance          Objective     Active Range of Motion     Lumbar   Flexion:  Restriction level: moderate  Extension:  Restriction level: minimal  Left lateral flexion:  Restriction level: moderate  Right lateral flexion:  Restriction level: moderate  Left rotation:  Restriction level: minimal  Right rotation:  Restriction level: minimal  Left Hip   Flexion: [facility]  External rotation (90/90): 30 degrees   Internal rotation (90/90): 15 degrees     Right Hip   Flexion: with pain  External rotation (90/90): 30 degrees   Internal rotation (90/90): 15 degrees     Strength/Myotome Testing     Left Hip   Planes of Motion   Flexion: 4+  Abduction: 4+  Adduction: 4+  External rotation: 4  Internal rotation: 4    Right Hip   Planes of Motion   Flexion: 4+  Abduction: 4+  Adduction: 4+  External rotation: 4  Internal rotation: 4+    Left Knee   Flexion: 4+  Extension: 4    Right Knee   Flexion: 4+  Extension: 4+    Left Ankle/Foot   Dorsiflexion: 5    Right Ankle/Foot   Dorsiflexion: 5    General Comments:      Lumbar Comments  Hamstrings:  R: 75 degrees  L: 70 degrees    (+) Hip flexor and ITB tightness noted B/L     Neuro Exam:     Functional outcomes   5x sit to stand: 16 41 (seconds)  Functional outcome comment: NBOS EO - good  NBOS EC - fair plus with mild sway but stable  Heel/toe tandem stance - fair  with mild/mod sway with SBA/CGA              Diagnosis: impaired B/L hip ROM, gait/balance dysfunction   Precautions: type 2 DM, h/o R RTC repair 2007, neuropathy B/L feet   Primary impairments: decreased B/L hip IR/ER ROM, BLE tightness, balance deficits   *asterisks by exercise = given for HEP     12/6 12/8 12/29 11/22 11/29   Manuals             BLE stretching (hamstrings/ITB/piriformis/hip flexors)             Prone hip IR/ER stretching/PROM                                                     There Ex 12/6 12/8 12/29 11/29   Bike 8' 8' 8'  8' 8'   Postural rows/ext with core brace x30 ea 10#         Supine core brace   2x10         Supine bridge with add squeeze   2x10         S/L clamshells   2x10         HR/TR 30 ea 30x ea  30x ea    20 ea   SLR 3 ways  30ea standing 2# 30ea standing 2#  standing 2# x30 ea  20 x supine 2# AW BL  Standing 2# 2x10 ea    Hamstring stretch        3x30" B/L seated     LAQ       2x 10 2# BAW     Neuro Re-Ed 12/6 12/8 12/29 11/29   NBOS on Airex with EC (SBA/CGA)             Heel/toe tandem stance 2x30" B 2x30" B   3 x 30" B/L     Alt step taps       cone taps x 20 1 UE assist       sidestepping 4 laps 2# AW  4 laps 2# AW   foam x 4 laps CGA   3 laps  4 laps 2# AW   Retro walking 3 laps 1 UE 3 laps 1 UE  3 laps 1 UE    3 laps 1 UE    F/L stepover cones             Sit<> stands              marching/ tandem walking  3 laps ea  2# w/ marching 3 laps ea  2# w/ marching  foam x 4 laps ea  CGA  3 laps  3 laps 2# AW ea   Hurdles       3 hurdles (6")   3x laps fwd/lat no UE support (CGAx1     Walking w/ HT HN      x 5 laps CGA         rockerboard                           Re-evaluation     KR       Ther Act/Gait  12/6 12/8 12/29 11/29    HEP instruction                            Modalities   12/8

## 2023-01-24 ENCOUNTER — OFFICE VISIT (OUTPATIENT)
Dept: OBGYN CLINIC | Facility: HOSPITAL | Age: 88
End: 2023-01-24

## 2023-01-24 VITALS
BODY MASS INDEX: 21.62 KG/M2 | HEIGHT: 70 IN | WEIGHT: 151 LBS | DIASTOLIC BLOOD PRESSURE: 69 MMHG | SYSTOLIC BLOOD PRESSURE: 104 MMHG | HEART RATE: 83 BPM

## 2023-01-24 DIAGNOSIS — M25.562 CHRONIC PAIN OF BOTH KNEES: Primary | ICD-10-CM

## 2023-01-24 DIAGNOSIS — G89.29 CHRONIC PAIN OF BOTH KNEES: Primary | ICD-10-CM

## 2023-01-24 DIAGNOSIS — M25.561 CHRONIC PAIN OF BOTH KNEES: Primary | ICD-10-CM

## 2023-01-24 DIAGNOSIS — M17.0 PRIMARY OSTEOARTHRITIS OF BOTH KNEES: ICD-10-CM

## 2023-01-24 RX ORDER — BUPIVACAINE HYDROCHLORIDE 2.5 MG/ML
2 INJECTION, SOLUTION INFILTRATION; PERINEURAL
Status: COMPLETED | OUTPATIENT
Start: 2023-01-24 | End: 2023-01-24

## 2023-01-24 RX ORDER — LIDOCAINE HYDROCHLORIDE 10 MG/ML
2 INJECTION, SOLUTION INFILTRATION; PERINEURAL
Status: COMPLETED | OUTPATIENT
Start: 2023-01-24 | End: 2023-01-24

## 2023-01-24 RX ORDER — BETAMETHASONE SODIUM PHOSPHATE AND BETAMETHASONE ACETATE 3; 3 MG/ML; MG/ML
12 INJECTION, SUSPENSION INTRA-ARTICULAR; INTRALESIONAL; INTRAMUSCULAR; SOFT TISSUE
Status: COMPLETED | OUTPATIENT
Start: 2023-01-24 | End: 2023-01-24

## 2023-01-24 RX ADMIN — LIDOCAINE HYDROCHLORIDE 2 ML: 10 INJECTION, SOLUTION INFILTRATION; PERINEURAL at 09:17

## 2023-01-24 RX ADMIN — BETAMETHASONE SODIUM PHOSPHATE AND BETAMETHASONE ACETATE 12 MG: 3; 3 INJECTION, SUSPENSION INTRA-ARTICULAR; INTRALESIONAL; INTRAMUSCULAR; SOFT TISSUE at 09:17

## 2023-01-24 RX ADMIN — BUPIVACAINE HYDROCHLORIDE 2 ML: 2.5 INJECTION, SOLUTION INFILTRATION; PERINEURAL at 09:17

## 2023-01-24 NOTE — PROGRESS NOTES
Assessment:  1  Chronic pain of both knees        2  Primary osteoarthritis of both knees            Plan:  Bilateral knee osteoarthritis  The patient was provided with bilateral knee steroid injections  The patient tolerated the procedure well  The patient should follow up in 3 months  To do next visit:  Return in about 3 months (around 4/24/2023)  The above stated was discussed in layman's terms and the patient expressed understanding  All questions were answered to the patient's satisfaction  Scribe Attestation    I,:  Albert Kelley am acting as a scribe while in the presence of the attending physician :       I,:  Sandy Sevilla MD personally performed the services described in this documentation    as scribed in my presence :             Subjective: Rosaline Kasper is a 80 y o  male who presents for follow up of bilateral knees  He is s/p bilateral knee steroid injections with benefit, 10/18/2022  Today he complains of return of bilateral generalized knee pain and instability  He does care for his wife who is s/p total knee arthroplasty at [facility]          Review of systems negative unless otherwise specified in HPI    Past Medical History:   Diagnosis Date   • Abnormal loss of weight     last assessed: 4/24/13   • Diverticulitis    • Diverticulosis    • Type 2 diabetes mellitus ([address] )        Past Surgical History:   Procedure Laterality Date   • APPENDECTOMY      Age 12   • CATARACT EXTRACTION      right 5/2002, left 12/2003   • COLON SURGERY      for diverticulitis   • COLONOSCOPY      done 5/5/2009, mild diverticulosis noted, recheck in 3 years   • FL INJECTION LEFT HIP (NON ARTHROGRAM)  11/1/2022   • FL INJECTION RIGHT HIP (NON ARTHROGRAM)  11/1/2022   • INNER EAR SURGERY Left     Cochlear device implantation; resolved: Sep 2012   • SHOULDER SURGERY  05/2007    right rotator cuff       Family History   Problem Relation Age of Onset   • Coronary artery disease Sister    • Diabetes Sister    • Diabetes Daughter        Social History     Occupational History   • Not on file   Tobacco Use   • Smoking status: Never   • Smokeless tobacco: Never   Substance and Sexual Activity   • Alcohol use: No     Comment: social drinker as per [employer]   • Drug use: No   • Sexual activity: Not on file         Current Outpatient Medications:   •  levothyroxine 75 mcg tablet, TAKE 1 TABLET(75 MCG) BY MOUTH DAILY, Disp: 90 tablet, Rfl: 3    No Known Allergies         Vitals:    01/24/23 0852   BP: 104/69   Pulse: 83       Objective:  Physical exam  · General: Awake, Alert, Oriented  · Eyes: Pupils equal, round and reactive to light  · Heart: regular rate and rhythm  · Lungs: No audible wheezing  · Abdomen: soft                    Ortho Exam  Bilateral knees:  No erythema or ecchymosis  No effusion or swelling  Normal strength  Good ROM with crepitus   Calf compartments soft and supple  Sensation intact  Toes are warm sensate and mobile      Diagnostics, reviewed and taken today if performed as documented:    None performed     Procedures, if performed today:    Large joint arthrocentesis: R knee  Universal Protocol:  Consent: Verbal consent obtained  Risks and benefits: risks, benefits and alternatives were discussed  Consent given by: patient  Time out: Immediately prior to procedure a "time out" was called to verify the correct patient, procedure, equipment, support staff and site/side marked as required    Timeout called at: 1/24/2023 9:16 AM   Patient understanding: patient states understanding of the procedure being performed  Site marked: the operative site was marked  Patient identity confirmed: verbally with patient    Supporting Documentation  Indications: pain   Procedure Details  Location: knee - R knee  Preparation: Patient was prepped and draped in the usual sterile fashion  Needle size: 22 G  Ultrasound guidance: no  Approach: anterolateral  Medications administered: 12 mg betamethasone acetate-betamethasone sodium phosphate 6 (3-3) mg/mL; 2 mL bupivacaine 0 25 %; 2 mL lidocaine 1 %    Patient tolerance: patient tolerated the procedure well with no immediate complications  Dressing:  Sterile dressing applied    Large joint arthrocentesis: L knee  Universal Protocol:  Consent: Verbal consent obtained  Risks and benefits: risks, benefits and alternatives were discussed  Consent given by: patient  Time out: Immediately prior to procedure a "time out" was called to verify the correct patient, procedure, equipment, support staff and site/side marked as required  Timeout called at: 1/24/2023 9:16 AM   Patient understanding: patient states understanding of the procedure being performed  Site marked: the operative site was marked  Patient identity confirmed: verbally with patient    Supporting Documentation  Indications: pain   Procedure Details  Location: knee - L knee  Preparation: Patient was prepped and draped in the usual sterile fashion  Needle size: 22 G  Ultrasound guidance: no  Approach: anterolateral  Medications administered: 12 mg betamethasone acetate-betamethasone sodium phosphate 6 (3-3) mg/mL; 2 mL bupivacaine 0 25 %; 2 mL lidocaine 1 %    Patient tolerance: patient tolerated the procedure well with no immediate complications  Dressing:  Sterile dressing applied            Portions of the record may have been created with voice recognition software  Occasional wrong word or "sound a like" substitutions may have occurred due to the inherent limitations of voice recognition software  Read the chart carefully and recognize, using context, where substitutions have occurred

## 2023-03-23 ENCOUNTER — LAB (OUTPATIENT)
Dept: LAB | Facility: HOSPITAL | Age: 88
End: 2023-03-23

## 2023-03-23 DIAGNOSIS — E11.40 CONTROLLED TYPE 2 DIABETES MELLITUS WITH DIABETIC NEUROPATHY, WITHOUT LONG-TERM CURRENT USE OF INSULIN (HCC): ICD-10-CM

## 2023-03-23 DIAGNOSIS — E03.9 ACQUIRED HYPOTHYROIDISM: ICD-10-CM

## 2023-03-23 LAB
ALBUMIN SERPL BCP-MCNC: 3.8 G/DL (ref 3.5–5)
ALP SERPL-CCNC: 56 U/L (ref 34–104)
ALT SERPL W P-5'-P-CCNC: 11 U/L (ref 7–52)
ANION GAP SERPL CALCULATED.3IONS-SCNC: 5 MMOL/L (ref 4–13)
AST SERPL W P-5'-P-CCNC: 14 U/L (ref 13–39)
BILIRUB SERPL-MCNC: 0.66 MG/DL (ref 0.2–1)
BUN SERPL-MCNC: 18 MG/DL (ref 5–25)
CALCIUM SERPL-MCNC: 9.3 MG/DL (ref 8.4–10.2)
CHLORIDE SERPL-SCNC: 104 MMOL/L (ref 96–108)
CO2 SERPL-SCNC: 30 MMOL/L (ref 21–32)
CREAT SERPL-MCNC: 0.94 MG/DL (ref 0.6–1.3)
CREAT UR-MCNC: 110 MG/DL
EST. AVERAGE GLUCOSE BLD GHB EST-MCNC: 148 MG/DL
GFR SERPL CREATININE-BSD FRML MDRD: 72 ML/MIN/1.73SQ M
GLUCOSE P FAST SERPL-MCNC: 129 MG/DL (ref 65–99)
HBA1C MFR BLD: 6.8 %
MICROALBUMIN UR-MCNC: <5 MG/L (ref 0–20)
MICROALBUMIN/CREAT 24H UR: <5 MG/G CREATININE (ref 0–30)
POTASSIUM SERPL-SCNC: 4.1 MMOL/L (ref 3.5–5.3)
PROT SERPL-MCNC: 6.3 G/DL (ref 6.4–8.4)
SODIUM SERPL-SCNC: 139 MMOL/L (ref 135–147)
TSH SERPL DL<=0.05 MIU/L-ACNC: 1.7 UIU/ML (ref 0.45–4.5)

## 2023-03-28 ENCOUNTER — OFFICE VISIT (OUTPATIENT)
Dept: FAMILY MEDICINE CLINIC | Facility: CLINIC | Age: 88
End: 2023-03-28

## 2023-03-28 VITALS
HEART RATE: 77 BPM | WEIGHT: 144 LBS | RESPIRATION RATE: 16 BRPM | BODY MASS INDEX: 20.62 KG/M2 | OXYGEN SATURATION: 97 % | HEIGHT: 70 IN | TEMPERATURE: 97.1 F | SYSTOLIC BLOOD PRESSURE: 110 MMHG | DIASTOLIC BLOOD PRESSURE: 61 MMHG

## 2023-03-28 DIAGNOSIS — E11.51 DIABETES MELLITUS WITH PERIPHERAL VASCULAR DISEASE (HCC): ICD-10-CM

## 2023-03-28 DIAGNOSIS — R63.4 WEIGHT LOSS: ICD-10-CM

## 2023-03-28 DIAGNOSIS — R09.82 POSTNASAL DRIP: ICD-10-CM

## 2023-03-28 DIAGNOSIS — R26.89 BALANCE DISORDER: ICD-10-CM

## 2023-03-28 DIAGNOSIS — E11.40 CONTROLLED TYPE 2 DIABETES MELLITUS WITH DIABETIC NEUROPATHY, WITHOUT LONG-TERM CURRENT USE OF INSULIN (HCC): Primary | ICD-10-CM

## 2023-03-28 DIAGNOSIS — E03.9 ACQUIRED HYPOTHYROIDISM: ICD-10-CM

## 2023-03-28 DIAGNOSIS — N52.9 ERECTILE DYSFUNCTION, UNSPECIFIED ERECTILE DYSFUNCTION TYPE: ICD-10-CM

## 2023-03-28 DIAGNOSIS — R05.3 CHRONIC COUGH: ICD-10-CM

## 2023-03-28 DIAGNOSIS — N18.31 STAGE 3A CHRONIC KIDNEY DISEASE (HCC): ICD-10-CM

## 2023-03-28 DIAGNOSIS — I73.9 PVD (PERIPHERAL VASCULAR DISEASE) (HCC): ICD-10-CM

## 2023-03-28 DIAGNOSIS — E44.1 MILD PROTEIN-CALORIE MALNUTRITION (HCC): ICD-10-CM

## 2023-03-28 DIAGNOSIS — Z00.00 MEDICARE ANNUAL WELLNESS VISIT, SUBSEQUENT: ICD-10-CM

## 2023-03-28 PROBLEM — L84 CALLUS: Status: ACTIVE | Noted: 2022-02-16

## 2023-03-28 RX ORDER — FLUTICASONE PROPIONATE 50 MCG
1 SPRAY, SUSPENSION (ML) NASAL DAILY
Qty: 16 G | Refills: 1 | Status: SHIPPED | OUTPATIENT
Start: 2023-03-28

## 2023-03-28 RX ORDER — SILDENAFIL 50 MG/1
50 TABLET, FILM COATED ORAL DAILY PRN
Qty: 10 TABLET | Refills: 0 | Status: SHIPPED | OUTPATIENT
Start: 2023-03-28

## 2023-03-28 NOTE — PROGRESS NOTES
Assessment and Plan:     Problem List Items Addressed This Visit        Endocrine    Hypothyroidism     3/2023 TSH normal  Continue levothyroxine 75mcg daily  Relevant Orders    CBC    Comprehensive metabolic panel    TSH, 3rd generation with Free T4 reflex    Hemoglobin A1C    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, without long-term current use of insulin ([place] [id number] ) - Primary       Lab Results   Component Value Date    HGBA1C 6 8 (H) 03/23/2023     Diet control  Relevant Orders    CBC    Comprehensive metabolic panel    TSH, 3rd generation with Free T4 reflex    Hemoglobin A1C    Diabetes mellitus with peripheral vascular disease ([place] [id number] )       Lab Results   Component Value Date    HGBA1C 6 8 (H) 03/23/2023     Check doppler  Cardiovascular and Mediastinum    PVD (peripheral vascular disease) ([facility])    Relevant Orders    VAS MAZIN & waveform analysis, multiple levels       Genitourinary    Stage 3a chronic kidney disease (HCC)    Relevant Orders    CBC    Comprehensive metabolic panel    TSH, 3rd generation with Free T4 reflex    Hemoglobin A1C       Other    Male erectile dysfunction, unspecified     Pt asked for viagra  Give 50mg prn  SE educated pt  Relevant Medications    sildenafil (VIAGRA) 50 MG tablet    Balance disorder     Refer to PT  Pt would like to go to [employer]  Relevant Orders    Ambulatory Referral to Physical Therapy    Mild protein-calorie malnutrition ([place] [address] )     Malnutrition Findings:     Advised pt to eat more protein/Ensure  BMI Findings: Body mass index is 20 66 kg/m²  Chronic cough     Check CXR  Pt denies asthma or GERD  Has postnasal drip  Relevant Orders    XR chest pa & lateral    Postnasal drip     Give flonase  SE educated pt  Relevant Medications    fluticasone (FLONASE) 50 mcg/act nasal spray    Weight loss     Lost 8 lbs since 6 months ago   Denies GI symptoms  Advised pt to take more protein rich food  Hold CT scan now  Other Visit Diagnoses     Medicare annual wellness visit, subsequent              Depression Screening and Follow-up Plan: Patient was screened for depression during today's encounter  They screened negative with a PHQ-2 score of 0  Reviewed lab in 3/2023  M/c <5 good  CMP ok GFR 72 improved  HgA1C 6 8 stable  TSH normal    Flu shot yearly  Got Covid19 shots and boosters  Got PCV13 and pneumovax  Got shingrix  Due for Tdap  Fall precautions    RTO in 6 months        Preventive health issues were discussed with patient, and age appropriate screening tests were ordered as noted in patient's After Visit Summary  Personalized health advice and appropriate referrals for health education or preventive services given if needed, as noted in patient's After Visit Summary  History of Present Illness:     Patient presents for a Medicare Wellness Visit    HPI     He is here with his wife      B/o legs pain for 1 year  Does not bother him walking  No more cramping  Denies back pain  Hx of PVD  Cough constantly for years  Pt has postnasal drip  He worked in [employer]  and some of his friend had asbestos  Weight loss---pt states he does not have much appetite  Use Ensure sometimes  Denies n/v/diarrhea  Denies abdominal pain       DM---3/2023 HgA1C 6 8 controlled  Diet control  Denies hypoglycemia  Neuropathy on feet  FU ophthalmologist Dr Olga Rodriguez in [facility]  Cannot see small print  FU podiatry Dr Merle High in [address],[facility] [place] [address] 6 [place]       Hypothyroidism---He is on levothyroxine 75mcg daily  Feels fine       Live with wife  Does all ADL's  Very active/play golf/gardening/vaccum in house  Denies recent falls     Denies depression        Patient Care Team:  Polina Raimrez MD as PCP - General (Family Medicine)  Le Patel DO     Review of Systems:     Review of Systems   Constitutional: Negative for appetite change, chills and fever  HENT: Negative for congestion, ear pain, sinus pain and sore throat  Eyes: Negative for discharge and itching  Respiratory: Negative for apnea, cough, chest tightness, shortness of breath and wheezing  Cardiovascular: Negative for chest pain, palpitations and leg swelling  Gastrointestinal: Negative for abdominal pain, anal bleeding, constipation, diarrhea, nausea and vomiting  Endocrine: Negative for cold intolerance, heat intolerance and polyuria  Genitourinary: Negative for difficulty urinating and dysuria  Musculoskeletal: Negative for arthralgias, back pain and myalgias  Skin: Negative for rash  Neurological: Negative for dizziness and headaches  Psychiatric/Behavioral: Negative for agitation          Problem List:     Patient Active Problem List   Diagnosis   • Hypothyroidism   • 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, without long-term current use of insulin ([facility] )   • Loss of hearing   • Primary osteoarthritis of left hip   • Primary osteoarthritis of both knees   • Male erectile dysfunction, unspecified   • Vertigo   • Balance disorder   • RBBB (right bundle branch block)   • PVD (peripheral vascular disease) ([facility] )   • Pancreatic cyst   • Left hip pain   • Hammertoe   • Seborrheic keratoses   • Allergic rhinitis   • Diabetes mellitus with peripheral vascular disease (HCC)   • Onychomycosis   • Nail hypertrophy   • Stage 3a chronic kidney disease (HCC)   • Callus   • Mild protein-calorie malnutrition (HCC)   • Chronic cough   • Postnasal drip   • Weight loss      Past Medical and Surgical History:     Past Medical History:   Diagnosis Date   • Abnormal loss of weight     last assessed: 4/24/13   • Diverticulitis    • Diverticulosis    • Type 2 diabetes mellitus ([facility] )      Past Surgical History:   Procedure Laterality Date   • APPENDECTOMY      Age 12   • CATARACT EXTRACTION      right 5/2002, left 12/2003   • COLON SURGERY for diverticulitis   • COLONOSCOPY      done 5/5/2009, mild diverticulosis noted, recheck in 3 years   • FL INJECTION LEFT HIP (NON ARTHROGRAM)  11/1/2022   • FL INJECTION RIGHT HIP (NON ARTHROGRAM)  11/1/2022   • INNER EAR SURGERY Left     Cochlear device implantation; resolved: Sep 2012   • SHOULDER SURGERY  05/2007    right rotator cuff      Family History:     Family History   Problem Relation Age of Onset   • Coronary artery disease Sister    • Diabetes Sister    • Diabetes Daughter       Social History:     Social History     Socioeconomic History   • Marital status: /Civil Union     Spouse name: None   • Number of children: None   • Years of education: None   • Highest education level: None   Occupational History   • None   Tobacco Use   • Smoking status: Never   • Smokeless tobacco: Never   Vaping Use   • Vaping Use: Never used   Substance and Sexual Activity   • Alcohol use: No     Comment: social drinker as per [employer]   • Drug use: No   • Sexual activity: None   Other Topics Concern   • None   Social History Narrative   • None     Social Determinants of Health     Financial Resource Strain: Low Risk    • Difficulty of Paying Living Expenses: Not hard at all   Food Insecurity: Not on file   Transportation Needs: No Transportation Needs   • Lack of Transportation (Medical): No   • Lack of Transportation (Non-Medical):  No   Physical Activity: Not on file   Stress: Not on file   Social Connections: Not on file   Intimate Partner Violence: Not on file   Housing Stability: Not on file      Medications and Allergies:     Current Outpatient Medications   Medication Sig Dispense Refill   • fluticasone (FLONASE) 50 mcg/act nasal spray 1 spray into each nostril daily 16 g 1   • levothyroxine 75 mcg tablet TAKE 1 TABLET(75 MCG) BY MOUTH DAILY 90 tablet 3   • loratadine-pseudoephedrine (CLARITIN-D 24-HOUR)  mg per 24 hr tablet Take 1 tablet by mouth daily     • sildenafil (VIAGRA) 50 MG tablet Take 1 tablet (50 mg total) by mouth daily as needed for erectile dysfunction 10 tablet 0     No current facility-administered medications for this visit  No Known Allergies   Immunizations:     Immunization History   Administered Date(s) Administered   • COVID-19 MODERNA VACC 0 5 ML IM 01/25/2021, 03/01/2021, 11/05/2021   • COVID-19 Moderna Vac BIVALENT 12 Yr+ IM (BOOSTER ONLY) 0 5 ML 11/14/2022   • H1N1, All Formulations 01/31/2010   • Influenza Split High Dose Preservative Free IM 11/09/2015, 11/10/2016, 09/23/2017   • Influenza, high dose seasonal 0 7 mL 10/18/2018, 12/19/2019, 10/07/2020, 10/06/2021   • Influenza, seasonal, injectable 10/02/2012, 10/21/2013, 11/17/2014, 11/18/2014   • Pneumococcal Conjugate 13-Valent 12/03/2013, 12/17/2014   • Pneumococcal Polysaccharide PPV23 01/01/2005   • Td (adult), adsorbed 09/19/2011   • Zoster 11/06/2012   • Zoster Vaccine Recombinant 12/10/2018, 06/13/2019   • influenza, injectable, quadrivalent 11/14/2022      Health Maintenance: There are no preventive care reminders to display for this patient  There are no preventive care reminders to display for this patient  Medicare Screening Tests and Risk Assessments:     Lita Ray is here for his Subsequent Wellness visit  Health Risk Assessment:   Patient rates overall health as very good  Patient feels that their physical health rating is same  Patient is very satisfied with their life  Eyesight was rated as same  Hearing was rated as slightly worse  Patient feels that their emotional and mental health rating is same  Patients states they are never, rarely angry  Patient states they are never, rarely unusually tired/fatigued  Pain experienced in the last 7 days has been none  Patient states that he has experienced no weight loss or gain in last 6 months  Depression Screening:   PHQ-2 Score: 0      Fall Risk Screening:    In the past year, patient has experienced: no history of falling in past year      Home Safety:  Patient does not have trouble with stairs inside or outside of their home  Patient has working smoke alarms and has working carbon monoxide detector  Home safety hazards include: none  Nutrition:   Current diet is Regular and Frequent junk food  Medications:   Patient is currently taking over-the-counter supplements  OTC medications include: see medication list  Patient is able to manage medications  Activities of Daily Living (ADLs)/Instrumental Activities of Daily Living (IADLs):   Walk and transfer into and out of bed and chair?: Yes  Dress and groom yourself?: Yes    Bathe or shower yourself?: Yes    Feed yourself? Yes  Do your laundry/housekeeping?: Yes  Manage your money, pay your bills and track your expenses?: Yes  Make your own meals?: Yes    Do your own shopping?: Yes    Previous Hospitalizations:   Any hospitalizations or ED visits within the last 12 months?: No      Advance Care Planning:   Living will: Yes    Advanced directive: Yes      Comments: POA---wife  Living will---DNR if end of life    Cognitive Screening:   Provider or family/friend/caregiver concerned regarding cognition?: No    PREVENTIVE SCREENINGS      Cardiovascular Screening:    General: Screening Current and Screening Not Indicated      Diabetes Screening:     General: Screening Not Indicated and History Diabetes      Colorectal Cancer Screening:     General: Screening Not Indicated      Prostate Cancer Screening:    General: Screening Not Indicated      Osteoporosis Screening:    General: Screening Not Indicated      Lung Cancer Screening:     General: Screening Not Indicated    Screening, Brief Intervention, and Referral to Treatment (SBIRT)    Screening  Typical number of drinks in a day: 0  Typical number of drinks in a week: 0  Interpretation: Low risk drinking behavior      Single Item Drug Screening:  How often have you used an illegal drug (including marijuana) or a prescription medication for non-medical "reasons in the past year? never    Single Item Drug Screen Score: 0  Interpretation: Negative screen for possible drug use disorder    No results found  Physical Exam:     /61   Pulse 77   Temp (!) 97 1 °F (36 2 °C) (Tympanic)   Resp 16   Ht 5' 10\" (1 778 m)   Wt 65 3 kg (144 lb)   SpO2 97%   BMI 20 66 kg/m²     Physical Exam  Vitals reviewed  Constitutional:       Appearance: Normal appearance  HENT:      Head: Normocephalic and atraumatic  Eyes:      General:         Right eye: No discharge  Left eye: No discharge  Conjunctiva/sclera: Conjunctivae normal    Neck:      Vascular: No carotid bruit  Cardiovascular:      Rate and Rhythm: Normal rate and regular rhythm  Heart sounds: Normal heart sounds  No murmur heard  No friction rub  No gallop  Pulmonary:      Effort: Pulmonary effort is normal  No respiratory distress  Breath sounds: Normal breath sounds  No wheezing or rales  Abdominal:      General: Bowel sounds are normal  There is no distension  Palpations: Abdomen is soft  Tenderness: There is no abdominal tenderness  Musculoskeletal:         General: No swelling, tenderness or deformity  Normal range of motion  Cervical back: Normal range of motion and neck supple  No muscular tenderness  Lymphadenopathy:      Cervical: No cervical adenopathy  Neurological:      Mental Status: He is alert     Psychiatric:         Mood and Affect: Mood normal           Latia Luna MD  "

## 2023-03-28 NOTE — PATIENT INSTRUCTIONS
Medicare Preventive Visit Patient Instructions  Thank you for completing your Welcome to Medicare Visit or Medicare Annual Wellness Visit today  Your next wellness visit will be due in one year (3/28/2024)  The screening/preventive services that you may require over the next 5-10 years are detailed below  Some tests may not apply to you based off risk factors and/or age  Screening tests ordered at today's visit but not completed yet may show as past due  Also, please note that scanned in results may not display below  Preventive Screenings:  Service Recommendations Previous Testing/Comments   Colorectal Cancer Screening  · Colonoscopy    · Fecal Occult Blood Test (FOBT)/Fecal Immunochemical Test (FIT)  · Fecal DNA/Cologuard Test  · Flexible Sigmoidoscopy Age: 39-70 years old   Colonoscopy: every 10 years (May be performed more frequently if at higher risk)  OR  FOBT/FIT: every 1 year  OR  Cologuard: every 3 years  OR  Sigmoidoscopy: every 5 years  Screening may be recommended earlier than age 39 if at higher risk for colorectal cancer  Also, an individualized decision between you and your healthcare provider will decide whether screening between the ages of 74-80 would be appropriate   Colonoscopy: Not on file  FOBT/FIT: Not on file  Cologuard: Not on file  Sigmoidoscopy: Not on file    Screening Not Indicated     Prostate Cancer Screening Individualized decision between patient and health care provider in men between ages of 53-78   Medicare will cover every 12 months beginning on the day after your 50th birthday PSA: No results in last 5 years     Screening Not Indicated     Hepatitis C Screening Once for adults born between 1945 and 1965  More frequently in patients at high risk for Hepatitis C Hep C Antibody: Not on file        Diabetes Screening 1-2 times per year if you're at risk for diabetes or have pre-diabetes Fasting glucose: 129 mg/dL (3/23/2023)  A1C: 6 8 % (3/23/2023)  Screening Not Indicated  History Diabetes   Cholesterol Screening Once every 5 years if you don't have a lipid disorder  May order more often based on risk factors  Lipid panel: 07/06/2020  Screening Current      Other Preventive Screenings Covered by Medicare:  1  Abdominal Aortic Aneurysm (AAA) Screening: covered once if your at risk  You're considered to be at risk if you have a family history of AAA or a male between the age of 73-68 who smoking at least 100 cigarettes in your lifetime  2  Lung Cancer Screening: covers low dose CT scan once per year if you meet all of the following conditions: (1) Age 50-69; (2) No signs or symptoms of lung cancer; (3) Current smoker or have quit smoking within the last 15 years; (4) You have a tobacco smoking history of at least 20 pack years (packs per day x number of years you smoked); (5) You get a written order from a healthcare provider  3  Glaucoma Screening: covered annually if you're considered high risk: (1) You have diabetes OR (2) Family history of glaucoma OR (3)  aged 48 and older OR (3)  American aged 72 and older  3  Osteoporosis Screening: covered every 2 years if you meet one of the following conditions: (1) Have a vertebral abnormality; (2) On glucocorticoid therapy for more than 3 months; (3) Have primary hyperparathyroidism; (4) On osteoporosis medications and need to assess response to drug therapy  5  HIV Screening: covered annually if you're between the age of 12-76  Also covered annually if you are younger than 13 and older than 72 with risk factors for HIV infection  For pregnant patients, it is covered up to 3 times per pregnancy      Immunizations:  Immunization Recommendations   Influenza Vaccine Annual influenza vaccination during flu season is recommended for all persons aged >= 6 months who do not have contraindications   Pneumococcal Vaccine   * Pneumococcal conjugate vaccine = PCV13 (Prevnar 13), PCV15 (Vaxneuvance), PCV20 (Prevnar 20)  * Pneumococcal polysaccharide vaccine = PPSV23 (Pneumovax) Adults 2364 years old: 1-3 doses may be recommended based on certain risk factors  Adults 72 years old: 1-2 doses may be recommended based off what pneumonia vaccine you previously received   Hepatitis B Vaccine 3 dose series if at intermediate or high risk (ex: diabetes, end stage renal disease, liver disease)   Tetanus (Td) Vaccine - COST NOT COVERED BY MEDICARE PART B Following completion of primary series, a booster dose should be given every 10 years to maintain immunity against tetanus  Td may also be given as tetanus wound prophylaxis  Tdap Vaccine - COST NOT COVERED BY MEDICARE PART B Recommended at least once for all adults  For pregnant patients, recommended with each pregnancy  Shingles Vaccine (Shingrix) - COST NOT COVERED BY MEDICARE PART B  2 shot series recommended in those aged 48 and above     Health Maintenance Due:  There are no preventive care reminders to display for this patient  Immunizations Due:  There are no preventive care reminders to display for this patient  Advance Directives   What are advance directives? Advance directives are legal documents that state your wishes and plans for medical care  These plans are made ahead of time in case you lose your ability to make decisions for yourself  Advance directives can apply to any medical decision, such as the treatments you want, and if you want to donate organs  What are the types of advance directives? There are many types of advance directives, and each state has rules about how to use them  You may choose a combination of any of the following:  · Living will: This is a written record of the treatment you want  You can also choose which treatments you do not want, which to limit, and which to stop at a certain time  This includes surgery, medicine, IV fluid, and tube feedings  · Durable power of  for healthcare [facility]):   This is a written record that states who you want to make healthcare choices for you when you are unable to make them for yourself  This person, called a proxy, is usually a family member or a friend  You may choose more than 1 proxy  · Do not resuscitate (DNR) order:  A DNR order is used in case your heart stops beating or you stop breathing  It is a request not to have certain forms of treatment, such as CPR  A DNR order may be included in other types of advance directives  · Medical directive: This covers the care that you want if you are in a coma, near death, or unable to make decisions for yourself  You can list the treatments you want for each condition  Treatment may include pain medicine, surgery, blood transfusions, dialysis, IV or tube feedings, and a ventilator (breathing machine)  · Values history: This document has questions about your views, beliefs, and how you feel and think about life  This information can help others choose the care that you would choose  Why are advance directives important? An advance directive helps you control your care  Although spoken wishes may be used, it is better to have your wishes written down  Spoken wishes can be misunderstood, or not followed  Treatments may be given even if you do not want them  An advance directive may make it easier for your family to make difficult choices about your care  © Copyright [employer] 2018 Information is for End User's use only and may not be sold, redistributed or otherwise used for commercial purposes   All illustrations and images included in CareNotes® are the copyrighted property of [employer]  or [address] [place] [address] [employer]

## 2023-03-28 NOTE — ASSESSMENT & PLAN NOTE
Lost 8 lbs since 6 months ago  Denies GI symptoms  Advised pt to take more protein rich food  Hold CT scan now

## 2023-03-28 NOTE — ASSESSMENT & PLAN NOTE
Malnutrition Findings:     Advised pt to eat more protein/Ensure  BMI Findings: Body mass index is 20 66 kg/m²

## 2023-04-25 ENCOUNTER — OFFICE VISIT (OUTPATIENT)
Dept: OBGYN CLINIC | Facility: HOSPITAL | Age: 88
End: 2023-04-25

## 2023-04-25 ENCOUNTER — OFFICE VISIT (OUTPATIENT)
Dept: FAMILY MEDICINE CLINIC | Facility: CLINIC | Age: 88
End: 2023-04-25

## 2023-04-25 VITALS
BODY MASS INDEX: 20.62 KG/M2 | HEIGHT: 70 IN | DIASTOLIC BLOOD PRESSURE: 63 MMHG | HEART RATE: 88 BPM | WEIGHT: 144 LBS | SYSTOLIC BLOOD PRESSURE: 119 MMHG

## 2023-04-25 VITALS
RESPIRATION RATE: 17 BRPM | OXYGEN SATURATION: 96 % | HEIGHT: 70 IN | HEART RATE: 78 BPM | BODY MASS INDEX: 20.76 KG/M2 | DIASTOLIC BLOOD PRESSURE: 57 MMHG | SYSTOLIC BLOOD PRESSURE: 110 MMHG | WEIGHT: 145 LBS | TEMPERATURE: 97.4 F

## 2023-04-25 DIAGNOSIS — M25.561 CHRONIC PAIN OF BOTH KNEES: ICD-10-CM

## 2023-04-25 DIAGNOSIS — M17.0 PRIMARY OSTEOARTHRITIS OF BOTH KNEES: Primary | ICD-10-CM

## 2023-04-25 DIAGNOSIS — G89.29 CHRONIC PAIN OF BOTH KNEES: ICD-10-CM

## 2023-04-25 DIAGNOSIS — R05.3 CHRONIC COUGH: ICD-10-CM

## 2023-04-25 DIAGNOSIS — R63.4 WEIGHT LOSS: Primary | ICD-10-CM

## 2023-04-25 DIAGNOSIS — M25.562 CHRONIC PAIN OF BOTH KNEES: ICD-10-CM

## 2023-04-25 RX ORDER — BUPIVACAINE HYDROCHLORIDE 2.5 MG/ML
2 INJECTION, SOLUTION INFILTRATION; PERINEURAL
Status: COMPLETED | OUTPATIENT
Start: 2023-04-25 | End: 2023-04-25

## 2023-04-25 RX ORDER — BETAMETHASONE SODIUM PHOSPHATE AND BETAMETHASONE ACETATE 3; 3 MG/ML; MG/ML
12 INJECTION, SUSPENSION INTRA-ARTICULAR; INTRALESIONAL; INTRAMUSCULAR; SOFT TISSUE
Status: COMPLETED | OUTPATIENT
Start: 2023-04-25 | End: 2023-04-25

## 2023-04-25 RX ADMIN — BETAMETHASONE SODIUM PHOSPHATE AND BETAMETHASONE ACETATE 12 MG: 3; 3 INJECTION, SUSPENSION INTRA-ARTICULAR; INTRALESIONAL; INTRAMUSCULAR; SOFT TISSUE at 09:16

## 2023-04-25 RX ADMIN — BUPIVACAINE HYDROCHLORIDE 2 ML: 2.5 INJECTION, SOLUTION INFILTRATION; PERINEURAL at 09:16

## 2023-04-25 NOTE — PROGRESS NOTES
Assessment:   Diagnosis ICD-10-CM Associated Orders   1  Primary osteoarthritis of both knees  M17 0 Large joint arthrocentesis: bilateral knee      2  Chronic pain of both knees  M25 561 Large joint arthrocentesis: bilateral knee    M25 562     G89 29           Plan:  • Diagnosis, treatment options and associated risks were discussed with the patient including no treatment, nonsurgical treatment and potential for surgical intervention  The patient was given the opportunity to ask questions regarding each  • He was offered, accepted, formed injections of cortisone (RCC) to both knees today for symptomatic relief  He tolerated both injections well  • Ice and postinjection protocol advised  • Weightbearing and activities as tolerated  • He was demonstrated quadricep strengthening exercises in the office today  To do next visit:  Return in about 3 months (around 7/25/2023) for re-check  The above stated was discussed in layman's terms and the patient expressed understanding  All questions were answered to the patient's satisfaction  Scribe Attestation    I,:  Sherif Sheehan am acting as a scribe while in the presence of the attending physician :       I,:  Daphnie Cronin MD personally performed the services described in this documentation    as scribed in my presence :             Subjective: Nettie Carrasquillo is a 80 y o  male who presents today for repeat evaluation of his bilateral knees, due to return of pain  He has known advanced osteoarthritis of both knees  Both knees have increased knee pain with weightbearing activities  He has stiffness getting up after prolonged sedentary positions  He does walk his dog 1 mile twice a day  He also enjoys golfing  He has difficulty ambulating stairs  He finds relief of approximately 1 month's time with intermittent injections of cortisone, his last being 3 months ago    He previously had viscosupplementation with less than appreciable relief compared to cortisone injections  Pain scale 7/10        Review of systems negative unless otherwise specified in HPI  Review of Systems    Past Medical History:   Diagnosis Date   • Abnormal loss of weight     last assessed: 4/24/13   • Diverticulitis    • Diverticulosis    • Type 2 diabetes mellitus ([facility] [address] )        Past Surgical History:   Procedure Laterality Date   • APPENDECTOMY      Age 12   • CATARACT EXTRACTION      right 5/2002, left 12/2003   • COLON SURGERY      for diverticulitis   • COLONOSCOPY      done 5/5/2009, mild diverticulosis noted, recheck in 3 years   • FL INJECTION LEFT HIP (NON ARTHROGRAM)  11/1/2022   • FL INJECTION RIGHT HIP (NON ARTHROGRAM)  11/1/2022   • INNER EAR SURGERY Left     Cochlear device implantation; resolved: Sep 2012   • SHOULDER SURGERY  05/2007    right rotator cuff       Family History   Problem Relation Age of Onset   • Coronary artery disease Sister    • Diabetes Sister    • Diabetes Daughter        Social History     Occupational History   • Not on file   Tobacco Use   • Smoking status: Never   • Smokeless tobacco: Never   Vaping Use   • Vaping Use: Never used   Substance and Sexual Activity   • Alcohol use: No     Comment: social drinker as per [employer]   • Drug use: No   • Sexual activity: Not on file         Current Outpatient Medications:   •  fluticasone (FLONASE) 50 mcg/act nasal spray, 1 spray into each nostril daily, Disp: 16 g, Rfl: 1  •  levothyroxine 75 mcg tablet, TAKE 1 TABLET(75 MCG) BY MOUTH DAILY, Disp: 90 tablet, Rfl: 3  •  loratadine-pseudoephedrine (CLARITIN-D 24-HOUR)  mg per 24 hr tablet, Take 1 tablet by mouth daily, Disp: , Rfl:   •  sildenafil (VIAGRA) 50 MG tablet, Take 1 tablet (50 mg total) by mouth daily as needed for erectile dysfunction, Disp: 10 tablet, Rfl: 0    No Known Allergies         Vitals:    04/25/23 0852   BP: 119/63   Pulse: 88       Objective:                    Right Knee Exam     Muscle Strength   The patient has normal "right knee strength  Tenderness   The patient is experiencing tenderness in the medial joint line  Range of Motion   Extension: 0   Flexion: 120 (With crepitation and stiffness)     Other   Erythema: absent  Sensation: normal  Effusion: no effusion present      Left Knee Exam     Muscle Strength   The patient has normal left knee strength  Tenderness   The patient is experiencing tenderness in the medial joint line  Range of Motion   Extension: 0   Flexion: 120 (With crepitation and stiffness)     Other   Erythema: absent  Sensation: normal  Effusion: no effusion present    Comments: Both knees remain in mild varus alignment with bony enlargement medially  Diagnostics, reviewed and taken today if performed as documented:    None performed          Procedures, if performed today:    Large joint arthrocentesis: bilateral knee  Universal Protocol:  Consent: Verbal consent obtained  Risks and benefits: risks, benefits and alternatives were discussed  Consent given by: patient  Time out: Immediately prior to procedure a \"time out\" was called to verify the correct patient, procedure, equipment, support staff and site/side marked as required    Timeout called at: 4/25/2023 9:16 AM   Patient understanding: patient states understanding of the procedure being performed  Site marked: the operative site was marked  Patient identity confirmed: verbally with patient    Supporting Documentation  Indications: pain   Procedure Details  Location: knee - bilateral knee  Preparation: Patient was prepped and draped in the usual sterile fashion  Needle size: 22 G  Ultrasound guidance: no    Medications (Right): 2 mL bupivacaine 0 25 %; 12 mg betamethasone acetate-betamethasone sodium phosphate 6 (3-3) mg/mLMedications (Left): 2 mL bupivacaine 0 25 %; 12 mg betamethasone acetate-betamethasone sodium phosphate 6 (3-3) mg/mL   Patient tolerance: patient tolerated the procedure well with no immediate " "complications  Dressing:  Sterile dressing applied                Portions of the record may have been created with voice recognition software  Occasional wrong word or \"sound a like\" substitutions may have occurred due to the inherent limitations of voice recognition software  Read the chart carefully and recognize, using context, where substitutions have occurred    "

## 2023-04-25 NOTE — PROGRESS NOTES
Name: Angel Rosales      : 1935      MRN: [id number]  Encounter Provider: Miladys Cowan MD  Encounter Date: 2023   Encounter department: [address] [place] [address]     1  Weight loss  -     CT chest wo contrast; Future; Expected date: 2023  -     CT abdomen pelvis w wo contrast; Future; Expected date: 2023  -     Ambulatory Referral to Gastroenterology; Future    2  Chronic cough  -     CT chest wo contrast; Future; Expected date: 2023           Subjective      HPI     Pt is here with his wife  Weight loss---pt states he does not have much appetite  Use Ensure sometimes  Denies n/v/diarrhea  Denies abdominal pain  2022 152 lbs  3/28/2023 144 lbs  Today 145 lbs  Wife states her friend had cancer recently and she is worried about cancer  Cough constantly for years  Pt has postnasal drip  He uses flonase and claritin daily  He worked in [address]  and some of his friend had asbestos/mesothelioma  Review of Systems   Constitutional: Positive for unexpected weight change  Negative for appetite change, chills and fever  HENT: Positive for postnasal drip  Negative for congestion, ear pain, sinus pain and sore throat  Eyes: Negative for discharge and itching  Respiratory: Positive for cough  Negative for apnea, chest tightness, shortness of breath and wheezing  Cardiovascular: Negative for chest pain, palpitations and leg swelling  Gastrointestinal: Negative for abdominal pain, anal bleeding, constipation, diarrhea, nausea and vomiting  Endocrine: Negative for cold intolerance, heat intolerance and polyuria  Genitourinary: Negative for difficulty urinating and dysuria  Musculoskeletal: Negative for arthralgias, back pain and myalgias  Skin: Negative for rash  Neurological: Negative for dizziness and headaches  Psychiatric/Behavioral: Negative for agitation         Current Outpatient Medications on File Prior to "Visit   Medication Sig   • fluticasone (FLONASE) 50 mcg/act nasal spray 1 spray into each nostril daily (Patient taking differently: 1 spray into each nostril if needed)   • levothyroxine 75 mcg tablet TAKE 1 TABLET(75 MCG) BY MOUTH DAILY   • loratadine-pseudoephedrine (CLARITIN-D 24-HOUR)  mg per 24 hr tablet Take 1 tablet by mouth daily   • sildenafil (VIAGRA) 50 MG tablet Take 1 tablet (50 mg total) by mouth daily as needed for erectile dysfunction       Objective     /57   Pulse 78   Temp (!) 97 4 °F (36 3 °C)   Resp 17   Ht 5' 10\" (1 778 m)   Wt 65 8 kg (145 lb)   SpO2 96%   BMI 20 81 kg/m²     Physical Exam  Constitutional:       Appearance: He is well-developed  HENT:      Head: Normocephalic and atraumatic  Right Ear: Tympanic membrane normal       Left Ear: Tympanic membrane normal       Mouth/Throat:      Pharynx: No oropharyngeal exudate or posterior oropharyngeal erythema  Eyes:      General:         Right eye: No discharge  Left eye: No discharge  Conjunctiva/sclera: Conjunctivae normal    Cardiovascular:      Rate and Rhythm: Normal rate and regular rhythm  Heart sounds: Normal heart sounds  No murmur heard  No friction rub  No gallop  Pulmonary:      Effort: Pulmonary effort is normal  No respiratory distress  Breath sounds: Normal breath sounds  No wheezing or rales  Abdominal:      General: Bowel sounds are normal  There is no distension  Palpations: Abdomen is soft  Tenderness: There is no abdominal tenderness  There is no guarding  Musculoskeletal:         General: Normal range of motion  Cervical back: Normal range of motion and neck supple  Neurological:      Mental Status: He is alert         Miladys Cowan MD  "

## 2023-05-02 ENCOUNTER — TELEPHONE (OUTPATIENT)
Dept: FAMILY MEDICINE CLINIC | Facility: CLINIC | Age: 88
End: 2023-05-02

## 2023-05-02 NOTE — TELEPHONE ENCOUNTER
Patient's spouse, Heather Blair ([phone]) called to request assistance in finding a gastroenterologist for patient  Reports all providers they contacted within [facility] have long wait times  Asks if provider has recommendations or can assist with getting appointment sooner  Requests call back with information

## 2023-05-03 ENCOUNTER — CONSULT (OUTPATIENT)
Dept: GASTROENTEROLOGY | Facility: MEDICAL CENTER | Age: 88
End: 2023-05-03

## 2023-05-03 VITALS
BODY MASS INDEX: 19.92 KG/M2 | SYSTOLIC BLOOD PRESSURE: 124 MMHG | DIASTOLIC BLOOD PRESSURE: 77 MMHG | HEART RATE: 76 BPM | WEIGHT: 138.8 LBS | TEMPERATURE: 97.4 F

## 2023-05-03 DIAGNOSIS — R63.4 WEIGHT LOSS: ICD-10-CM

## 2023-05-03 NOTE — PROGRESS NOTES
[facility] - Outpatient Consultation  [place] 80 y o  male MRN: [id number]  Encounter: [id number]          ASSESSMENT AND PLAN:      1  Weight loss  -Patient has no alarming GI symptoms  Agree with CT scan with IV contrast to evaluate for occult malignancy  Wife was  Very nervous after learning multiple friends had pancreatic cancer  I explained to the wife regarding CT scan with IV contrast, CT scan with IV contrast is the first choice to screen for pancreatic cancer at this point  We will continue to follow if CT showed any GI abnormalities  - Ambulatory Referral to Gastroenterology    ______________________________________________________________________    HPI: 40-year-old man referred by primary care doctor for evaluation of unintentional weight loss  Patient denies dysphagia, abdominal pain, nausea or vomiting, diarrhea or constipation  He had history of partial left colectomy due to complication from diverticulitis many years ago  He reported regular formed bowel movement  He denies blood in the stool  His last colonoscopy was long time ago due to his age  He denies family history of GI cancer  REVIEW OF SYSTEMS:    CONSTITUTIONAL: Denies any fever, chills, rigors, and weight loss  HEENT: No earache or tinnitus  Denies hearing loss or visual disturbances  CARDIOVASCULAR: No chest pain or palpitations  RESPIRATORY: Denies any cough, hemoptysis, shortness of breath or dyspnea on exertion  GASTROINTESTINAL: As noted in the History of Present Illness  GENITOURINARY: No problems with urination  Denies any hematuria or dysuria  NEUROLOGIC: No dizziness or vertigo, denies headaches  MUSCULOSKELETAL: Denies any muscle or joint pain  SKIN: Denies skin rashes or itching  ENDOCRINE: Denies excessive thirst  Denies intolerance to heat or cold  PSYCHOSOCIAL: Denies depression or anxiety  Denies any recent memory loss         Historical Information   Past Medical History:   Diagnosis Date    Abnormal loss of weight     last assessed: 4/24/13    Diverticulitis     Diverticulosis     Type 2 diabetes mellitus ([facility] [address] )      Past Surgical History:   Procedure Laterality Date    APPENDECTOMY      Age 12    CATARACT EXTRACTION      right 5/2002, left 12/2003    COLON SURGERY      for diverticulitis    COLONOSCOPY      done 5/5/2009, mild diverticulosis noted, recheck in 3 years    [facility] INJECTION LEFT HIP (NON ARTHROGRAM)  11/1/2022    FL INJECTION RIGHT HIP (NON ARTHROGRAM)  11/1/2022    INNER EAR SURGERY Left     Cochlear device implantation; resolved: Sep 2012    SHOULDER SURGERY  05/2007    right rotator cuff     Social History   Social History     Substance and Sexual Activity   Alcohol Use No    Comment: social drinker as per [employer]     Social History     Substance and Sexual Activity   Drug Use No     Social History     Tobacco Use   Smoking Status Never   Smokeless Tobacco Never     Family History   Problem Relation Age of Onset    Coronary artery disease Sister     Diabetes Sister     Diabetes Daughter        Meds/Allergies       Current Outpatient Medications:     levothyroxine 75 mcg tablet    sildenafil (VIAGRA) 50 MG tablet    fluticasone (FLONASE) 50 mcg/act nasal spray    loratadine-pseudoephedrine (CLARITIN-D 24-HOUR)  mg per 24 hr tablet    No Known Allergies        Objective     Blood pressure 124/77, pulse 76, temperature (!) 97 4 °F (36 3 °C), temperature source Tympanic, weight 63 kg (138 lb 12 8 oz)  Body mass index is 19 92 kg/m²  PHYSICAL EXAM:      General Appearance:   Alert, cooperative, no distress   HEENT:   Normocephalic, atraumatic, anicteric      Neck:  Supple, symmetrical, trachea midline   Lungs:   Clear to auscultation bilaterally; no rales, rhonchi or wheezing; respirations unlabored    Heart[de-identified]   Regular rate and rhythm; no murmur, rub, or gallop     Abdomen:   Soft, non-tender, non-distended; normal bowel sounds; no masses, no organomegaly    Genitalia:   Deferred    Rectal:   Deferred    Extremities:  No cyanosis, clubbing or edema    Pulses:  2+ and symmetric    Skin:  No jaundice, rashes, or lesions    Lymph nodes:  No palpable cervical lymphadenopathy        Lab Results:   No visits with results within 1 Day(s) from this visit  Latest known visit with results is:   Lab on 03/23/2023   Component Date Value    Sodium 03/23/2023 139     Potassium 03/23/2023 4 1     Chloride 03/23/2023 104     CO2 03/23/2023 30     ANION GAP 03/23/2023 5     BUN 03/23/2023 18     Creatinine 03/23/2023 0 94     Glucose, Fasting 03/23/2023 129 (H)     Calcium 03/23/2023 9 3     AST 03/23/2023 14     ALT 03/23/2023 11     Alkaline Phosphatase 03/23/2023 56     Total Protein 03/23/2023 6 3 (L)     Albumin 03/23/2023 3 8     Total Bilirubin 03/23/2023 0 66     eGFR 03/23/2023 72     Hemoglobin A1C 03/23/2023 6 8 (H)     EAG 03/23/2023 148     TSH 3RD GENERATON 03/23/2023 1 698     Creatinine, Ur 03/23/2023 110 0     Microalbum  ,U,Random 03/23/2023 <5 0     Microalb Creat Ratio 03/23/2023 <5          Radiology Results:   XR chest pa & lateral    Result Date: 4/19/2023  Narrative: CHEST INDICATION:   R05 3: Chronic cough  COMPARISON:  CXR 8/30/2012, chest CT 2/12/2007  EXAM PERFORMED/VIEWS:  XR CHEST PA & LATERAL  DUAL ENERGY SUBTRACTION  FINDINGS: Cardiomediastinal silhouette normal  No acute disease  Benign linear atelectasis or scar in the right midlung  Benign calcified right apical pleural-parenchymal scar  No effusion or pneumothorax  Upper abdomen normal  Bones normal for age  Impression: No acute cardiopulmonary disease  Workstation performed: [id number]     VAS MAZIN & waveform analysis, multiple levels    Result Date: 4/17/2023  Narrative:  [facility] REPORT [facility]IN[facility]: Indications: Patient presents with pain in legs when at rest, walks a mile every day and has no problems   Clinical Right Pressure:  124/ mm Hg, Left Pressure:  119/ mm Hg  FINDINGS:  Segment       Rig  Left                          P    P  Peroneal      162  149  Post  Tibial  154  143  Ankle         162  149  Metatarsal    181  166  Great Toe     119  110     CONCLUSION:  Impression RIGHT LOWER LIMB Ankle/Brachial Index: 1 31 within normal limits PPG/PVR Tracings are normal   Triphasic waveforms at the ankle  Metatarsal Pressure 181 mmHg Great Toe Pressure: 119 mmHg, within the healing range  LEFT LOWER LIMB Ankle/Brachial Index: 1 2 within normal limits PPG/PVR Tracings are normal   Triphasic waveforms at the ankle  Metatarsal Pressure 166 mmHg Great Toe Pressure: 110 mmHg, within the healing range    SIGNATURE: Electronically Signed by: Kody Velazquez MD on 2023-04-17 07:26:09 PM

## 2023-05-05 ENCOUNTER — HOSPITAL ENCOUNTER (OUTPATIENT)
Dept: CT IMAGING | Facility: HOSPITAL | Age: 88
Discharge: HOME | End: 2023-05-05

## 2023-05-05 DIAGNOSIS — R05.3 CHRONIC COUGH: ICD-10-CM

## 2023-05-05 DIAGNOSIS — R63.4 WEIGHT LOSS: ICD-10-CM

## 2023-05-05 RX ADMIN — IOHEXOL 85 ML: 350 INJECTION, SOLUTION INTRAVENOUS at 07:28

## 2023-05-09 ENCOUNTER — TELEPHONE (OUTPATIENT)
Dept: FAMILY MEDICINE CLINIC | Facility: CLINIC | Age: 88
End: 2023-05-09

## 2023-05-09 DIAGNOSIS — N13.30 HYDRONEPHROSIS OF LEFT KIDNEY: ICD-10-CM

## 2023-05-09 DIAGNOSIS — J40 BRONCHITIS: Primary | ICD-10-CM

## 2023-05-09 RX ORDER — AZITHROMYCIN 250 MG/1
TABLET, FILM COATED ORAL
Qty: 6 TABLET | Refills: 0 | Status: SHIPPED | OUTPATIENT
Start: 2023-05-09 | End: 2023-05-13

## 2023-05-17 DIAGNOSIS — R91.8 ABNORMAL CT SCAN OF LUNG: Primary | ICD-10-CM

## 2023-05-30 ENCOUNTER — TELEPHONE (OUTPATIENT)
Dept: UROLOGY | Facility: MEDICAL CENTER | Age: 88
End: 2023-05-30

## 2023-05-30 NOTE — TELEPHONE ENCOUNTER
New Patient    What is the reason for the patient’s appointment?:Hydronephrosis of left kidney    What office location does the patient prefer?:  [facility]  Does patient have Imaging/Lab Results:    Have patient records been requested?:  If No, are the records showing in Epic:       INSURANCE:  Do we accept the patient's insurance or is the patient Self-Pay?:    Insurance Provider: Medicare   Plan Type/Number:  Member ID#:       HISTORY:   Has the patient had any previous Urologist(s)?: no     Was the patient seen in the ED?:NO     Has the patient had any outside testing done?:No     Does the patient have a personal history of cancer?:  No

## 2023-05-31 ENCOUNTER — LAB (OUTPATIENT)
Dept: LAB | Facility: MEDICAL CENTER | Age: 88
End: 2023-05-31
Payer: MEDICARE

## 2023-05-31 ENCOUNTER — OFFICE VISIT (OUTPATIENT)
Dept: UROLOGY | Facility: CLINIC | Age: 88
End: 2023-05-31

## 2023-05-31 VITALS
HEART RATE: 78 BPM | HEIGHT: 70 IN | DIASTOLIC BLOOD PRESSURE: 56 MMHG | BODY MASS INDEX: 20.62 KG/M2 | WEIGHT: 144 LBS | SYSTOLIC BLOOD PRESSURE: 100 MMHG

## 2023-05-31 DIAGNOSIS — R35.1 BENIGN PROSTATIC HYPERPLASIA WITH NOCTURIA: ICD-10-CM

## 2023-05-31 DIAGNOSIS — N52.8 OTHER MALE ERECTILE DYSFUNCTION: ICD-10-CM

## 2023-05-31 DIAGNOSIS — Z12.5 PROSTATE CANCER SCREENING: ICD-10-CM

## 2023-05-31 DIAGNOSIS — R97.20 ELEVATED PSA: Primary | ICD-10-CM

## 2023-05-31 DIAGNOSIS — N40.1 BENIGN PROSTATIC HYPERPLASIA WITH NOCTURIA: ICD-10-CM

## 2023-05-31 DIAGNOSIS — N13.30 HYDRONEPHROSIS OF LEFT KIDNEY: Primary | ICD-10-CM

## 2023-05-31 LAB
BACTERIA UR QL AUTO: NORMAL /HPF
BILIRUB UR QL STRIP: NEGATIVE
CLARITY UR: CLEAR
COLOR UR: NORMAL
GLUCOSE UR STRIP-MCNC: NEGATIVE MG/DL
HGB UR QL STRIP.AUTO: NEGATIVE
KETONES UR STRIP-MCNC: NEGATIVE MG/DL
LEUKOCYTE ESTERASE UR QL STRIP: NEGATIVE
NITRITE UR QL STRIP: NEGATIVE
NON-SQ EPI CELLS URNS QL MICRO: NORMAL /HPF
PH UR STRIP.AUTO: 5.5 [PH]
PROT UR STRIP-MCNC: NEGATIVE MG/DL
PSA SERPL-MCNC: 31.01 NG/ML (ref 0–4)
RBC #/AREA URNS AUTO: NORMAL /HPF
SP GR UR STRIP.AUTO: 1.01 (ref 1–1.03)
UROBILINOGEN UR STRIP-ACNC: <2 MG/DL
WBC #/AREA URNS AUTO: NORMAL /HPF

## 2023-05-31 PROCEDURE — 81001 URINALYSIS AUTO W/SCOPE: CPT | Performed by: NURSE PRACTITIONER

## 2023-05-31 PROCEDURE — 36415 COLL VENOUS BLD VENIPUNCTURE: CPT

## 2023-05-31 PROCEDURE — 87086 URINE CULTURE/COLONY COUNT: CPT | Performed by: NURSE PRACTITIONER

## 2023-05-31 PROCEDURE — G0103 PSA SCREENING: HCPCS

## 2023-05-31 RX ORDER — TADALAFIL 5 MG/1
5 TABLET ORAL DAILY
Qty: 30 TABLET | Refills: 6 | Status: SHIPPED | OUTPATIENT
Start: 2023-05-31

## 2023-05-31 NOTE — ASSESSMENT & PLAN NOTE
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

## 2023-05-31 NOTE — PROGRESS NOTES
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  · Send urine micro and culture  · US of kidney and bladder in 6 months  · Follow up 6 months    Benign prostatic hyperplasia with nocturia  · Noted on ct  · AUA 12  · Would avoid tamsulosin due to dizziness and lightheadedness  · Would like to avoid proscar due to sexual side effects  · Begin cialis 5mg daily  · Follow up 6 months    Prostate cancer screening  · Check PSA we will call with results    Male erectile dysfunction, unspecified  · Discontinue sildenafil  · Begin daily Cialis to help with BPH symptoms as well as erectile dysfunction  · Follow-up 6 months      CINDY Joe    History of Present Illness     Lynn Moffett is a 80 y o  new patient who presents with his wife  He has been having weight loss  This prompted a ct of the chest, abdomen and pelvis which revealed an unchanged rotated left kidney with new mild left hydronephrosis possibly related to a mild UPJ obstruction  His creatinine 0 94  No history of kidney stones  His prostate was noted to be enlarged  He has scattered bladder diverticula  His wife is concerned about cancer  He denies family hx of prostate cancer but he did not know his father and his mother never told him anything  There is no PSA testing on file  He has BPH and gets up about 3-4 times to urinate  He does not drink a lot of liquids      Laboratory     Lab Results   Component Value Date    BUN 18 03/23/2023    CREATININE 0 94 03/23/2023       No components found for: \"GFR\"    Lab Results   Component Value Date    CALCIUM 9 3 03/23/2023     03/23/2023    CO2 30 03/23/2023    GLUCOSE 136 08/14/2015    K 4 1 03/23/2023     08/14/2015       Lab Results   Component Value Date    HCT 40 7 09/07/2022    HGB 13 4 09/07/2022    MCV 93 09/07/2022     09/07/2022    WBC 6 95 09/07/2022       No results found for: \"PSA\"    No results found for this or any " previous visit (from the past 1 hour(s))  @RESULT(URINEMICROSCOPIC)@    @RESULT(URINECULTURE)@    Radiology     CT CHEST, ABDOMEN AND PELVIS WITH IV CONTRAST 5/5/2023     INDICATION:   R63 4: Abnormal weight loss      COMPARISON: CT, dated July 31, 2016      TECHNIQUE: CT examination of the chest, abdomen and pelvis was performed  Multiplanar 2D reformatted images were created from the source data      Radiation dose length product (DLP) for this visit:  557 mGy-cm   This examination, like all CT scans performed in the [facility], was performed utilizing techniques to minimize radiation dose exposure, including the use of iterative   reconstruction and automated exposure control      IV Contrast:  85 mL of iohexol (OMNIPAQUE)  Enteric Contrast: Enteric contrast was administered      FINDINGS:     CHEST     LUNGS: Scattered areas of lingular juxtapleural tree-in-bud nodularity  Additionally, there are tiny small pulmonary nodules with associated mild groundglass opacification within the superior left lower lobe (series 6, image 57 and 60)     Right middle lobe basilar scarring with associated traction bronchiectasis      No dense lung consolidations  Scattered benign calcific granulomas  Mild by apical pleural-parenchymal scarring  Trachea is normal      PLEURA:  Unremarkable      HEART/GREAT VESSELS: Heart is unremarkable for patient's age  No thoracic aortic aneurysm      MEDIASTINUM AND PAM:  Unremarkable      CHEST WALL AND LOWER NECK:  Unremarkable      ABDOMEN     LIVER/BILIARY TREE:  Unremarkable      GALLBLADDER:  No calcified gallstones  No pericholecystic inflammatory change      SPLEEN: Calcified granulomata are noted in the spleen  No suspicious splenic mass      PANCREAS:  Unremarkable      ADRENAL GLANDS:  Unremarkable      KIDNEYS/URETERS: Redemonstrated mildly rotated bilateral kidneys  No solid renal mass   Mild left hydronephrosis, new from the comparison study in 2016      STOMACH AND BOWEL: Sigmoid colectomy  Descending colonic diverticulosis  Remainder of colon is normal  Small bowel is normal  Questionable antral wall thickening versus underdistention (series 2, image 139), more notable since a prior study in 2016  Remainder of stomach is normal      APPENDIX:  No findings to suggest appendicitis      ABDOMINOPELVIC CAVITY:  No ascites  No pneumoperitoneum  No lymphadenopathy      VESSELS:  Unremarkable for patient's age      PELVIS     REPRODUCTIVE ORGANS: Prostate is enlarged      URINARY BLADDER: Scattered bladder diverticula      ABDOMINAL WALL/INGUINAL REGIONS:  Unremarkable      OSSEOUS STRUCTURES:  No acute fracture or destructive osseous lesion  Spinal degenerative changes are noted      IMPRESSION:     Chest:  1  Scattered small areas of tree-in-bud nodularity overall favor an infectious or inflammatory etiology  Consider short interval follow-up to document resolution  2   Right middle lobe basilar scarring with associated traction bronchiectasis      Abdomen/pelvis:  1  Mild gastric antral wall thickening versus underdistention  Correlate with endoscopy as clinically indicated  2   Unchanged rotated left kidney with new mild left hydronephrosis since a comparison study in 2016, possibly related to mild ureteropelvic junction obstruction  Review of Systems     Review of Systems   Constitutional: Negative for activity change, appetite change, chills, fatigue, fever and unexpected weight change  HENT: Positive for hearing loss  Negative for facial swelling  Eyes: Negative for discharge  Respiratory: Negative  Negative for cough and shortness of breath  Cardiovascular: Negative for chest pain and leg swelling  Gastrointestinal: Negative  Negative for abdominal distention, abdominal pain, constipation, diarrhea, nausea and vomiting  Endocrine: Negative  Genitourinary: Negative    Negative for decreased urine volume, difficulty urinating, dysuria, enuresis, flank pain, frequency, genital sores, hematuria and urgency  Weak stream and nocturia x 3-4   Musculoskeletal: Negative for back pain and myalgias  Leg cramps   Skin: Negative for pallor and rash  Allergic/Immunologic: Negative  Negative for immunocompromised state  Neurological: Positive for dizziness and light-headedness  Negative for facial asymmetry and speech difficulty  Psychiatric/Behavioral: Negative for agitation and confusion  AUA SYMPTOM SCORE    Flowsheet Row Most Recent Value   AUA SYMPTOM SCORE    How often have you had a sensation of not emptying your bladder completely after you finished urinating? 2 (P)     How often have you had to urinate again less than two hours after you finished urinating? 2 (P)     How often have you found you stopped and started again several times when you urinate? 0 (P)     How often have you found it difficult to postpone urination? 1 (P)     How often have you had a weak urinary stream? 2 (P)     How often have you had to push or strain to begin urination? 0 (P)     How many times did you most typically get up to urinate from the time you went to bed at night until the time you got up in the morning? 4 (P)     Quality of Life: If you were to spend the rest of your life with your urinary condition just the way it is now, how would you feel about that? 2 (P)     AUA SYMPTOM SCORE 11 (P)             Allergies     No Known Allergies    Physical Exam     Physical Exam  Vitals reviewed  Constitutional:       General: He is not in acute distress  Appearance: Normal appearance  He is not ill-appearing, toxic-appearing or diaphoretic  HENT:      Head: Normocephalic and atraumatic  Eyes:      General: No scleral icterus  Cardiovascular:      Rate and Rhythm: Normal rate  Pulmonary:      Effort: Pulmonary effort is normal  No respiratory distress  Abdominal:      General: Abdomen is flat  There is no distension  "Palpations: Abdomen is soft  Tenderness: There is no abdominal tenderness  There is no right CVA tenderness, left CVA tenderness, guarding or rebound  Genitourinary:     Penis: Circumcised  No hypospadias, erythema, tenderness, discharge, swelling or lesions  Testes:         Right: Mass, tenderness or swelling not present  Right testis is descended  Left: Mass, tenderness or swelling not present  Left testis is descended  Epididymis:      Right: Not inflamed  No tenderness  Left: Not inflamed  No tenderness  Comments: Prostate smooth nontender without appreciable nodules or indurations approximately 50 g  Musculoskeletal:         General: No swelling  Cervical back: Normal range of motion  Skin:     General: Skin is warm and dry  Coloration: Skin is not jaundiced or pale  Findings: No rash  Neurological:      General: No focal deficit present  Mental Status: He is alert and oriented to person, place, and time  Gait: Gait normal    Psychiatric:         Mood and Affect: Mood normal          Behavior: Behavior normal          Thought Content:  Thought content normal          Judgment: Judgment normal          Vital Signs     Vitals:    05/31/23 0929   BP: 100/56   BP Location: Left arm   Patient Position: Sitting   Cuff Size: Adult   Pulse: 78   Weight: 65 3 kg (144 lb)   Height: 5' 10\" (1 778 m)       Current Medications       Current Outpatient Medications:   •  levothyroxine 75 mcg tablet, TAKE 1 TABLET(75 MCG) BY MOUTH DAILY, Disp: 90 tablet, Rfl: 3  •  loratadine-pseudoephedrine (CLARITIN-D 24-HOUR)  mg per 24 hr tablet, Take 1 tablet by mouth daily, Disp: , Rfl:   •  tadalafil (CIALIS) 5 MG tablet, Take 1 tablet (5 mg total) by mouth in the morning, Disp: 30 tablet, Rfl: 6  •  fluticasone (FLONASE) 50 mcg/act nasal spray, 1 spray into each nostril daily (Patient not taking: Reported on 5/3/2023), Disp: 16 g, Rfl: 1    Active Problems " Patient Active Problem List   Diagnosis   • Hypothyroidism   • 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, without long-term current use of insulin ([facility] )   • Loss of hearing   • Primary osteoarthritis of left hip   • Primary osteoarthritis of both knees   • Male erectile dysfunction, unspecified   • Vertigo   • Balance disorder   • RBBB (right bundle branch block)   • PVD (peripheral vascular disease) ([facility])   • Pancreatic cyst   • Left hip pain   • Hammertoe   • Seborrheic keratoses   • Allergic rhinitis   • Diabetes mellitus with peripheral vascular disease ([facility])   • Onychomycosis   • Nail hypertrophy   • Stage 3a chronic kidney disease ([facility])   • Callus   • Mild protein-calorie malnutrition ([facility])   • Chronic cough   • Postnasal drip   • Weight loss   • Benign prostatic hyperplasia with nocturia   • Hydronephrosis of left kidney   • Prostate cancer screening       Past Medical History     Past Medical History:   Diagnosis Date   • Abnormal loss of weight     last assessed: 4/24/13   • Diverticulitis    • Diverticulosis    • Type 2 diabetes mellitus ([facility] )        Surgical History     Past Surgical History:   Procedure Laterality Date   • APPENDECTOMY      Age 12   • CATARACT EXTRACTION      right 5/2002, left 12/2003   • COLON SURGERY      for diverticulitis   • COLONOSCOPY      done 5/5/2009, mild diverticulosis noted, recheck in 3 years   • FL INJECTION LEFT HIP (NON ARTHROGRAM)  11/1/2022   • FL INJECTION RIGHT HIP (NON ARTHROGRAM)  11/1/2022   • INNER EAR SURGERY Left     Cochlear device implantation; resolved: Sep 2012   • SHOULDER SURGERY  05/2007    right rotator cuff       Family History     Family History   Problem Relation Age of Onset   • Coronary artery disease Sister    • Diabetes Sister    • Diabetes Daughter        Social History     Social History     Social History     Tobacco Use   Smoking Status Never   Smokeless Tobacco Never       Past Surgical History:   Procedure Laterality Date   • APPENDECTOMY      Age 12   • CATARACT EXTRACTION      right 5/2002, left 12/2003   • COLON SURGERY      for diverticulitis   • COLONOSCOPY      done 5/5/2009, mild diverticulosis noted, recheck in 3 years   • FL INJECTION LEFT HIP (NON ARTHROGRAM)  11/1/2022   • FL INJECTION RIGHT HIP (NON ARTHROGRAM)  11/1/2022   • INNER EAR SURGERY Left     Cochlear device implantation; resolved: Sep 2012   • SHOULDER SURGERY  05/2007    right rotator cuff         The following portions of the patient's history were reviewed and updated as appropriate: allergies, current medications, past family history, past medical history, past social history, past surgical history and problem list    Please note :  Voice dictation software has been used to create this document  There may be inadvertent transcription errors      [id number]  [facility]

## 2023-05-31 NOTE — ASSESSMENT & PLAN NOTE
· Noted on ct  · AUA 12  · Would avoid tamsulosin due to dizziness and lightheadedness  · Would like to avoid proscar due to sexual side effects  · Begin cialis 5mg daily  · Follow up 6 months

## 2023-05-31 NOTE — ASSESSMENT & PLAN NOTE
· Discontinue sildenafil  · Begin daily Cialis to help with BPH symptoms as well as erectile dysfunction  · Follow-up 6 months

## 2023-05-31 NOTE — RESULT ENCOUNTER NOTE
Please let patient know his PSA came back elevated at 31 01   I recommend we repeat this in 2 weeks    If the PSA remains elevated I am going to recommend a prostate biopsy

## 2023-05-31 NOTE — PATIENT INSTRUCTIONS
BLADDER HEALTH    WHAT IS CONSIDERED NORMAL? The average bladder can hold about 2 cups of urine before it needs to be emptied  The normal range of voiding urine is 6 to 8 times during a 24 hour period  As we get older, our bladder capacity can get smaller and we may need to pass urine more frequently but usually not more than every 2 hours  Urine should flow easily without discomfort in a good, steady stream until the bladder is empty  No pushing or straining is necessary to empty the bladder  An urge is a signal that you feel as the bladder stretches to fill with urine  Urges can be felt even if the bladder is not full  Urges are not commands to go to the toilet, merely a signal and can be controlled  WHAT ARE GOOD BLADDER HABITS? Take your time when emptying your bladder  Don’t strain or push to empty your bladder  Make sure you empty your bladder completely each time you pass urine  Do not rush the process  Consistently ignoring the urge to go (waiting more than 4 hours between toileting) or urinating too infrequently may be convenient but not healthy for your bladder  Avoid going to the toilet “just in case” or more often than every 2 hours  It is usually not necessary to go when you feel the first urge  Try to go only when your bladder is full  Urgency and frequency of urination can be improved by retraining the bladder and spacing your fluid intake throughout the day  Practice good toilet habits  Don’t let your bladder control your life  TIPS TO MAINTAIN GOOD BLADDER HABITS  Maintain a good fluid intake  Depending on your body size and environment, drink 6 -8 cups (8 oz each) of fluid per day unless otherwise advised by your doctor  Not enough fluid creates a foul odor and dark color of the urine  Limit the amount of caffeine (coffee, cola, chocolate or tea) and citrus foods that you consume as these foods can be associated with increased sensation of urinary urgency and frequency  "    Limit the amount of alcohol you drink  Alcohol increases urine production and makes it difficult for the brain to coordinate bladder control  Avoid constipation by maintaining a balanced diet of dietary fiber  Cigarette smoking is also irritating to the bladder surface and is associated with bladder cancer  In addition, the coughing associated with smoking may lead to increased incontinent episodes because of the increased pressure  HOW DIET CAN AFFECT YOUR BLADDER  Although there is no particular \"diet\" that can cure bladder control, there are certain dietary suggestions you can use to help control the problem  There are 2 points to consider when evaluating how your habits and diet may affect your bladder:    Foods and fluids can irritate the bladder  Some foods and beverages are thought to contribute to bladder leakage and irritability  However their effect on the bladder is not completely understood and you may want to see if eliminating one or all of these items improves your bladder control  If you are unable to give them up completely, it is recommended that you use the following items in moderation:  Acidic beverages and foods (orange juice, grapefruit juice, lemonade etc)  Alcoholic beverages  Vinegar  Coffee (regular and decaf)  Tea (regular and decaf)  Caffeinated beverages  Carbonated beverages          Drinking enough and the right kinds of fluids  Many people with bladder control issues decrease their intake of liquids in hope that they will need to urinate less frequently or have less urinary leakage  You should not restrict fluids to control your bladder  While a decrease in liquid intake does result in a decrease in the volume of urine, the smaller amount of urine may be more highly concentrated  Highly concentrated, dark yellow urine is irritating to the bladder surface and may actually cause you to go to the bathroom more frequently        It also encourages the growth " of bacteria, which may lead to infections resulting in incontinence  Substitutions for Bladder Irritants: water is always the best beverage choice  Grape and apple juice are thirst quenchers are good selections and are not as irritating to the bladder  Low acid fruits:  Pears, apricots, papaya, watermelon  For coffee drinkers: KAVA®, Postum®, Fouzia®, Kaffree Alyson®  For tea drinkers:  non-citrus or herbal and sun brewed tea    Enlarged Prostate (BPH)   WHAT YOU NEED TO KNOW:   What do I need to know about an enlarged prostate (BPH)? An enlarged prostate (BPH) is a common condition in older adults  BPH develops because the number of prostate cells increases (hyperplasia) or the cells get bigger (hypertrophy)  The prostate wraps around the urethra  An enlarged prostate can press on the urethra  This may cause problems with storing urine or emptying your bladder completely  What are the signs and symptoms of BPH? Urinating 8 or more times each day    A feeling of not fully emptying your bladder when you urinate    An urgent need to urinate that you could not put off, or urinating again within 2 hours    Being woken from sleep because you needed to urinate    Trouble starting your urine flow, or a need to push or strain to get it to start    Urine that stops and starts several times when you urinate    A weak urine stream, or dribbling after you urinate    How is BPH diagnosed? A digital rectal exam  is used to check the size of your prostate  Your healthcare provider will insert a gloved finger into your rectum  The provider will be able to feel your prostate  The size of your prostate may be checked with ultrasound pictures  Urine tests  may show infection, or strength and amount of urine flow  You may need to record how often and how much you urinate for 24 hours  Blood tests  may show kidney problems or your PSA level  PSA is a substance produced by your prostate   PSA levels increase when you have BPH  A postvoid residual volume test  is used to measure the amount of urine left in your bladder after you urinate  How is BPH treated? Watchful waiting  means you do not receive treatment right away  Your signs and symptoms will be monitored over time to see if they get worse  You may be asked to keep a record and bring it to follow-up visits  This record may include when you urinate, how easy or difficult it was, and any changes in urination  Medicines  may be used to relax the muscles in your prostate and bladder  This may help you urinate more easily  You may also need medicine that helps shrink, or slow the growth of your prostate  A procedure  may be used to place a stent into your urethra to hold it open  A stent is a short, tiny mesh tube  A prostatic urethral lift, or UroLift, may be used to hold the prostate away from the urethra  This makes the urethra wider so urine can pass through more easily  Surgery  may be used to relieve your symptoms if other treatments do not work  Extra tissue that is causing your symptoms may be destroyed  All or part of your prostate may be removed during another type of surgery  What can I do to manage my symptoms? Urinate on a regular schedule  This will train your bladder to hold urine longer  A larger amount of urine may make it easier to urinate  Drink less liquid during the day  Do not have liquid for several hours before you go to bed at night  Do not drink large amounts of any liquid at one time  Limit alcohol and caffeine  These can irritate your bladder and make your symptoms worse  Eat less salt  Salt can cause fluid buildup and make it harder to urinate  Examples of salty foods are chips, cured meats, and canned soups  Do not use table salt  Elevate your legs if you have swelling  Elevate (raise) your legs above the level of your heart  This can relieve swelling caused by fluid buildup   You may not have to get up in the night to urinate  Exercise regularly  Exercise can help improve your symptoms  Ask your healthcare provider what a healthy weight for you is  Aim to get at least 30 minutes of exercise on most days of the week  When should I call my doctor? You see blood in your urine  You are not able to urinate  Your bladder feels very full and painful  You have new or worsening symptoms  You have a fever  You have questions or concerns about your condition or care  CARE AGREEMENT:   You have the right to help plan your care  Learn about your health condition and how it may be treated  Discuss treatment options with your healthcare providers to decide what care you want to receive  You always have the right to refuse treatment  The above information is an  only  It is not intended as medical advice for individual conditions or treatments  Talk to your doctor, nurse or pharmacist before following any medical regimen to see if it is safe and effective for you  © Copyright Abraham Porter 2022 Information is for End User's use only and may not be sold, redistributed or otherwise used for commercial purposes  Erectile Dysfunction   WHAT YOU NEED TO KNOW:   What is erectile dysfunction (ED)?  ED, or impotence, is when you cannot get or keep an erection for sexual activity  What causes ED? Conditions that lead to nerve problems, such as a spinal cord injury, diabetes, or stroke    Hormone imbalances, such as low testosterone, high prolactin, or a thyroid disorder    Medical conditions that decrease blood flow, such as high blood pressure and arteriosclerosis    Multiple sclerosis or Parkinson disease    Medicines, such as those used to treat depression and high blood pressure    Injury to the testicles from radiation therapy to the testicles    What increases my risk for ED?    Obesity    Diabetes that is not controlled or heart disease    Smoking, or drug or alcohol abuse    An enlarged prostate    Increasing age    Spine or groin surgeries    Stress, depression, relationship problems, and anxiety    How is ED diagnosed? Your healthcare provider will ask about your symptoms, medical history, and medicines  He or she will examine your abdomen, penis, and testicles  A rectal exam may also be done to check for an enlarged prostate  Blood and urine tests are done to check for medical conditions that may have caused your ED  You may also need tests to check your blood flow and nerve function  How is ED treated? Treatment depends on the cause of your ED  You may need any of the following:  ED medicines  help you have an erection  These medicines are taken before you have sex  Follow instructions on how to use these medicines  You may have a life-threatening reaction if you mix ED medicines with medicines that contain nitrates  Medicines with nitrates include nitroglycerin and other heart medicines  Testosterone  may be given to increase the levels in your blood and improve your ED  You may need to use a skin cream or wear a patch  Testosterone is also given as an injection  Penis injections  may be done to help improve your blood flow  A vacuum device  is a tube that is placed over the penis  A hand pump is connected to the tube and acts as a vacuum  This may help increase blood flow to the penis  Therapy  may be needed to treat emotional or relationship problems that may be causing your ED  Surgery  may be recommended if other treatments do not work  Surgery includes a penile implant or prosthesis  Surgery may also be done to improve blood flow  Ask for more information about surgeries for ED  How can I decrease my risk for ED? Do not smoke  Smoking can increase your risk for ED  Nicotine and other chemicals in cigarettes and cigars can also cause lung damage  Ask your healthcare provider for information if you currently smoke and need help to quit   E-cigarettes or smokeless tobacco still contain nicotine  Control your blood sugar levels if you have diabetes  Over time, high blood sugar levels can increase your risk for ED  Limit alcohol  Men should limit alcohol to 2 drinks a day  A drink of alcohol is 12 ounces of beer, 5 ounces of wine, or 1½ ounces of liquor  Manage your medical conditions  Eat a variety of healthy foods and stay physically active  Take your medicines as directed  They can help control conditions that may cause ED  Manage stress  Learn ways to relax, such as deep breathing, meditation, and listening to music  Do not use illegal drugs  They increase your risk for ED  When should I call my doctor? You have chest pain, dizziness, or nausea after you take ED medicines or during or after sex  You have an erection for more than 4 hours after you take your ED medicine  You see blood in your urine  You have changes in your vision, headaches, or back pain after you take ED medicine  You have a painful erection  You have questions or concerns about your condition or care  CARE AGREEMENT:   You have the right to help plan your care  Learn about your health condition and how it may be treated  Discuss treatment options with your healthcare providers to decide what care you want to receive  You always have the right to refuse treatment  The above information is an  only  It is not intended as medical advice for individual conditions or treatments  Talk to your doctor, nurse or pharmacist before following any medical regimen to see if it is safe and effective for you  © Copyright Dexter Huerta 2022 Information is for End User's use only and may not be sold, redistributed or otherwise used for commercial purposes  Tadalafil (By mouth)   Tadalafil ([id number])  Treats erectile dysfunction and the symptoms of benign prostatic hyperplasia (enlarged prostate)   Also treats pulmonary arterial hypertension (high blood pressure in the lungs) to improve your exercise ability  Brand Name(s): Adcirca, Alyq, Cialis   There may be other brand names for this medicine  When This Medicine Should Not Be Used: This medicine is not right for everyone  Do not use it if you had an allergic reaction to tadalafil  How to Use This Medicine:   Tablet  Take your medicine as directed  Your dose may need to be changed several times to find what works best for you  Swallow the tablet whole  Do not split, break, chew, or crush it  Use only the brand of medicine your doctor prescribed  Other brands may not work the same way  Read and follow the patient instructions that come with this medicine  Talk to your doctor or pharmacist if you have any questions  Missed dose: Take a dose as soon as you remember  If it is almost time for your next dose, wait until then and take a regular dose  Do not take extra medicine to make up for a missed dose  Store the medicine in a closed container at room temperature, away from heat, moisture, and direct light  Drugs and Foods to Avoid:   Ask your doctor or pharmacist before using any other medicine, including over-the-counter medicines, vitamins, and herbal products  Do not use this medicine if you are also taking riociguat or a nitrate medicine  You may use a nitrate medicine at least 48 hours after your last dose of tadalafil  Do not take other medicines that contain tadalafil or similar medicines, including sildenafil or vardenafil  Some foods and medicines can affect how tadalafil works   Tell your doctor if you are using any of the following:  Bosentan, carbamazepine, erythromycin, indinavir, itraconazole, ketoconazole, phenobarbital, phenytoin, rifampin, ritonavir  Blood pressure medicine (including alfuzosin, amlodipine, bendroflumethiazide, candesartan, doxazosin, enalapril, losartan, metoprolol, tamsulosin, terazosin)  Medicine to treat prostate problems  Do not have 5 or more alcohol-containing drinks in a short period of time while you are using this medicine  Do not eat grapefruit or drink grapefruit juice while you are using this medicine  Warnings While Using This Medicine:   Tell your doctor if you are pregnant or breastfeeding, or if you have kidney disease, liver disease, lung or breathing problems, diabetes, high cholesterol, cancer (including leukemia, multiple myeloma), sickle cell anemia, bleeding problems, stomach ulcer, problems with your penis, or eye problems  Tell your doctor if you have angina or chest pain during sex, heart disease, heart rhythm problems, high or low blood pressure, or a history of heart attack or stroke  Also tell your doctor if you smoke or drink alcohol  Tell any doctor who treats you that you are using tadalafil  This medicine may cause the following problems:   Low blood pressure (especially if taken with other medicines that lower blood pressure)  Painful or prolonged erection  Hearing or vision problems  Your doctor will do lab tests at regular visits to check on the effects of this medicine  Keep all appointments  Keep all medicine out of the reach of children  Never share your medicine with anyone  Possible Side Effects While Using This Medicine:   Call your doctor right away if you notice any of these side effects:   Allergic reaction: Itching or hives, swelling in your face or hands, swelling or tingling in your mouth or throat, chest tightness, trouble breathing  Blistering, peeling, or red skin rash  Chest pain, trouble breathing  Lightheadedness, fainting  Painful erection or an erection that lasts longer than 4 hours with or without pain  Sudden decrease in hearing or hearing loss, ringing in the ears, dizziness  Sudden loss of vision  If you notice these less serious side effects, talk with your doctor:   Back or muscle pain  Headache  Stuffy or runny nose  Upset stomach, nausea  Warmth or redness in your face, neck, arms, or upper chest  If you notice other side effects that you think are caused by this medicine, tell your doctor  Call your doctor for medical advice about side effects  You may report side effects to FDA at 5-533-KRX-8755  © Copyright Lue Quick 2022 Information is for End User's use only and may not be sold, redistributed or otherwise used for commercial purposes  The above information is an  only  It is not intended as medical advice for individual conditions or treatments  Talk to your doctor, nurse or pharmacist before following any medical regimen to see if it is safe and effective for you

## 2023-06-01 ENCOUNTER — TELEPHONE (OUTPATIENT)
Dept: UROLOGY | Facility: CLINIC | Age: 88
End: 2023-06-01

## 2023-06-01 LAB — BACTERIA UR CULT: NORMAL

## 2023-06-01 NOTE — TELEPHONE ENCOUNTER
Called patient and patient's wife, Juvenal Nettles answered the phone   Per communication consent I was able to tell the pt's wife that his urine testing did not show any signs of blood or infection          ----- Message from [facility] [employer] sent at 6/1/2023  1:41 PM EDT -----  Please let patient know his urine testing did not show any signs of blood or infection

## 2023-06-14 ENCOUNTER — APPOINTMENT (OUTPATIENT)
Dept: LAB | Facility: HOSPITAL | Age: 88
End: 2023-06-14
Payer: MEDICARE

## 2023-06-14 DIAGNOSIS — R97.20 ELEVATED PSA: ICD-10-CM

## 2023-06-14 LAB — PSA SERPL-MCNC: 25.48 NG/ML (ref 0–4)

## 2023-06-14 PROCEDURE — 36415 COLL VENOUS BLD VENIPUNCTURE: CPT

## 2023-06-14 PROCEDURE — 84153 ASSAY OF PSA TOTAL: CPT

## 2023-07-21 ENCOUNTER — APPOINTMENT (OUTPATIENT)
Dept: LAB | Facility: HOSPITAL | Age: 88
End: 2023-07-21
Payer: MEDICARE

## 2023-07-21 DIAGNOSIS — I51.9 MYXEDEMA HEART DISEASE: ICD-10-CM

## 2023-07-21 DIAGNOSIS — N18.31 CHRONIC KIDNEY DISEASE (CKD) STAGE G3A/A1, MODERATELY DECREASED GLOMERULAR FILTRATION RATE (GFR) BETWEEN 45-59 ML/MIN/1.73 SQUARE METER AND ALBUMINURIA CREATININE RATIO LESS THAN 30 MG/G (HCC): ICD-10-CM

## 2023-07-21 DIAGNOSIS — E03.9 MYXEDEMA HEART DISEASE: ICD-10-CM

## 2023-07-21 LAB
ALBUMIN SERPL BCP-MCNC: 3.6 G/DL (ref 3.5–5)
ALP SERPL-CCNC: 54 U/L (ref 34–104)
ALT SERPL W P-5'-P-CCNC: 15 U/L (ref 7–52)
ANION GAP SERPL CALCULATED.3IONS-SCNC: 5 MMOL/L
AST SERPL W P-5'-P-CCNC: 19 U/L (ref 13–39)
BASOPHILS # BLD AUTO: 0.04 THOUSANDS/ÂΜL (ref 0–0.1)
BASOPHILS NFR BLD AUTO: 1 % (ref 0–1)
BILIRUB SERPL-MCNC: 0.73 MG/DL (ref 0.2–1)
BUN SERPL-MCNC: 15 MG/DL (ref 5–25)
CALCIUM SERPL-MCNC: 8.9 MG/DL (ref 8.4–10.2)
CHLORIDE SERPL-SCNC: 102 MMOL/L (ref 96–108)
CO2 SERPL-SCNC: 28 MMOL/L (ref 21–32)
CREAT SERPL-MCNC: 1.01 MG/DL (ref 0.6–1.3)
EOSINOPHIL # BLD AUTO: 0.13 THOUSAND/ÂΜL (ref 0–0.61)
EOSINOPHIL NFR BLD AUTO: 2 % (ref 0–6)
ERYTHROCYTE [DISTWIDTH] IN BLOOD BY AUTOMATED COUNT: 12.3 % (ref 11.6–15.1)
EST. AVERAGE GLUCOSE BLD GHB EST-MCNC: 148 MG/DL
GFR SERPL CREATININE-BSD FRML MDRD: 66 ML/MIN/1.73SQ M
GLUCOSE SERPL-MCNC: 134 MG/DL (ref 65–140)
HBA1C MFR BLD: 6.8 %
HCT VFR BLD AUTO: 41.4 % (ref 36.5–49.3)
HGB BLD-MCNC: 13.3 G/DL (ref 12–17)
IMM GRANULOCYTES # BLD AUTO: 0.03 THOUSAND/UL (ref 0–0.2)
IMM GRANULOCYTES NFR BLD AUTO: 0 % (ref 0–2)
LYMPHOCYTES # BLD AUTO: 2.11 THOUSANDS/ÂΜL (ref 0.6–4.47)
LYMPHOCYTES NFR BLD AUTO: 31 % (ref 14–44)
MCH RBC QN AUTO: 28.9 PG (ref 26.8–34.3)
MCHC RBC AUTO-ENTMCNC: 32.1 G/DL (ref 31.4–37.4)
MCV RBC AUTO: 90 FL (ref 82–98)
MONOCYTES # BLD AUTO: 0.57 THOUSAND/ÂΜL (ref 0.17–1.22)
MONOCYTES NFR BLD AUTO: 8 % (ref 4–12)
NEUTROPHILS # BLD AUTO: 3.98 THOUSANDS/ÂΜL (ref 1.85–7.62)
NEUTS SEG NFR BLD AUTO: 58 % (ref 43–75)
NRBC BLD AUTO-RTO: 0 /100 WBCS
PLATELET # BLD AUTO: 203 THOUSANDS/UL (ref 149–390)
PMV BLD AUTO: 9.5 FL (ref 8.9–12.7)
POTASSIUM SERPL-SCNC: 4.1 MMOL/L (ref 3.5–5.3)
PROT SERPL-MCNC: 6.2 G/DL (ref 6.4–8.4)
RBC # BLD AUTO: 4.6 MILLION/UL (ref 3.88–5.62)
SODIUM SERPL-SCNC: 135 MMOL/L (ref 135–147)
TSH SERPL DL<=0.05 MIU/L-ACNC: 1.44 UIU/ML (ref 0.45–4.5)
WBC # BLD AUTO: 6.86 THOUSAND/UL (ref 4.31–10.16)

## 2023-07-21 PROCEDURE — 83036 HEMOGLOBIN GLYCOSYLATED A1C: CPT

## 2023-07-21 PROCEDURE — 80053 COMPREHEN METABOLIC PANEL: CPT

## 2023-07-21 PROCEDURE — 84443 ASSAY THYROID STIM HORMONE: CPT

## 2023-07-21 PROCEDURE — 85025 COMPLETE CBC W/AUTO DIFF WBC: CPT

## 2023-07-21 PROCEDURE — 36415 COLL VENOUS BLD VENIPUNCTURE: CPT

## 2023-07-25 ENCOUNTER — OFFICE VISIT (OUTPATIENT)
Dept: OBGYN CLINIC | Facility: HOSPITAL | Age: 88
End: 2023-07-25
Payer: MEDICARE

## 2023-07-25 ENCOUNTER — TELEPHONE (OUTPATIENT)
Dept: FAMILY MEDICINE CLINIC | Facility: CLINIC | Age: 88
End: 2023-07-25

## 2023-07-25 VITALS
SYSTOLIC BLOOD PRESSURE: 113 MMHG | HEART RATE: 83 BPM | BODY MASS INDEX: 20.62 KG/M2 | HEIGHT: 70 IN | WEIGHT: 144 LBS | DIASTOLIC BLOOD PRESSURE: 66 MMHG

## 2023-07-25 DIAGNOSIS — G89.29 CHRONIC PAIN OF BOTH KNEES: ICD-10-CM

## 2023-07-25 DIAGNOSIS — M25.561 CHRONIC PAIN OF BOTH KNEES: ICD-10-CM

## 2023-07-25 DIAGNOSIS — M25.562 CHRONIC PAIN OF BOTH KNEES: ICD-10-CM

## 2023-07-25 DIAGNOSIS — M17.0 PRIMARY OSTEOARTHRITIS OF BOTH KNEES: Primary | ICD-10-CM

## 2023-07-25 PROCEDURE — 20610 DRAIN/INJ JOINT/BURSA W/O US: CPT | Performed by: ORTHOPAEDIC SURGERY

## 2023-07-25 PROCEDURE — 99213 OFFICE O/P EST LOW 20 MIN: CPT | Performed by: ORTHOPAEDIC SURGERY

## 2023-07-25 RX ORDER — BETAMETHASONE SODIUM PHOSPHATE AND BETAMETHASONE ACETATE 3; 3 MG/ML; MG/ML
12 INJECTION, SUSPENSION INTRA-ARTICULAR; INTRALESIONAL; INTRAMUSCULAR; SOFT TISSUE
Status: COMPLETED | OUTPATIENT
Start: 2023-07-25 | End: 2023-07-25

## 2023-07-25 RX ORDER — BUPIVACAINE HYDROCHLORIDE 2.5 MG/ML
2 INJECTION, SOLUTION INFILTRATION; PERINEURAL
Status: COMPLETED | OUTPATIENT
Start: 2023-07-25 | End: 2023-07-25

## 2023-07-25 RX ORDER — LIDOCAINE HYDROCHLORIDE 10 MG/ML
2 INJECTION, SOLUTION INFILTRATION; PERINEURAL
Status: COMPLETED | OUTPATIENT
Start: 2023-07-25 | End: 2023-07-25

## 2023-07-25 RX ADMIN — BUPIVACAINE HYDROCHLORIDE 2 ML: 2.5 INJECTION, SOLUTION INFILTRATION; PERINEURAL at 09:15

## 2023-07-25 RX ADMIN — LIDOCAINE HYDROCHLORIDE 2 ML: 10 INJECTION, SOLUTION INFILTRATION; PERINEURAL at 09:15

## 2023-07-25 RX ADMIN — BETAMETHASONE SODIUM PHOSPHATE AND BETAMETHASONE ACETATE 12 MG: 3; 3 INJECTION, SUSPENSION INTRA-ARTICULAR; INTRALESIONAL; INTRAMUSCULAR; SOFT TISSUE at 09:15

## 2023-07-25 NOTE — TELEPHONE ENCOUNTER
Left message----- Message from [address] sent at 7/24/2023  1:27 PM EDT -----  Recent blood work looks good. Thyroid level normal, Hemoglobin A1c 6.8.

## 2023-07-25 NOTE — PROGRESS NOTES
Assessment:   Diagnosis ICD-10-CM Associated Orders   1. Primary osteoarthritis of both knees  M17.0 Large joint arthrocentesis: bilateral knee      2. Chronic pain of both knees  M25.561 Large joint arthrocentesis: bilateral knee    M25.562     G89.29           Plan:  • Diagnosis, treatment options and associated risks were discussed with the patient including no treatment, nonsurgical treatment and potential for surgical intervention. The patient was given the opportunity to ask questions regarding each. • He was offered, accepted, performed injections of cortisone (RCC) to both knees today for symptomatic relief. He tolerated both injections well. • Ice and postinjection protocol advised. • Weightbearing and activities as tolerated. To do next visit:  Return in about 3 months (around 10/25/2023) for re-check. The above stated was discussed in layman's terms and the patient expressed understanding. All questions were answered to the patient's satisfaction. Scribe Attestation    I,:  Gaston Lui am acting as a scribe while in the presence of the attending physician.:       I,:  Colt Vasquez MD personally performed the services described in this documentation    as scribed in my presence.:             Subjective: Demetrio Dougherty is a 80 y.o. male who presents today for repeat evaluation of his bilateral knees, due to return of pain. He has known advanced osteoarthritis of both knees. Both knees have increased knee pain with weightbearing activities. He has stiffness getting up after prolonged sedentary positions. He just returned from a  trip which he did a fair amount of walking. He had previous visco injections with less relief than intended. His last set of cortisone injections provided better and longer relief than previous injections. He walks his dog daily. He also golfs once a week, sometimes twice a week  Pain scale 7/10.     Review of systems negative unless otherwise specified in HPI  Review of Systems    Past Medical History:   Diagnosis Date   • Abnormal loss of weight     last assessed: 4/24/13   • Diverticulitis    • Diverticulosis    • Type 2 diabetes mellitus ([address])        Past Surgical History:   Procedure Laterality Date   • APPENDECTOMY      Age 12   • CATARACT EXTRACTION      right 5/2002, left 12/2003   • COLON SURGERY      for diverticulitis   • COLONOSCOPY      done 5/5/2009, mild diverticulosis noted, recheck in 3 years   • FL INJECTION LEFT HIP (NON ARTHROGRAM)  11/1/2022   • FL INJECTION RIGHT HIP (NON ARTHROGRAM)  11/1/2022   • INNER EAR SURGERY Left     Cochlear device implantation; resolved: Sep 2012   • SHOULDER SURGERY  05/2007    right rotator cuff       Family History   Problem Relation Age of Onset   • Coronary artery disease Sister    • Diabetes Sister    • Diabetes Daughter        Social History     Occupational History   • Not on file   Tobacco Use   • Smoking status: Never   • Smokeless tobacco: Never   Vaping Use   • Vaping Use: Never used   Substance and Sexual Activity   • Alcohol use: Yes     Comment: social drinker as per [employer]   • Drug use: No   • Sexual activity: Yes         Current Outpatient Medications:   •  fluticasone (FLONASE) 50 mcg/act nasal spray, 1 spray into each nostril daily (Patient not taking: Reported on 5/3/2023), Disp: 16 g, Rfl: 1  •  levothyroxine 75 mcg tablet, TAKE 1 TABLET(75 MCG) BY MOUTH DAILY, Disp: 90 tablet, Rfl: 3  •  loratadine-pseudoephedrine (CLARITIN-D 24-HOUR)  mg per 24 hr tablet, Take 1 tablet by mouth daily, Disp: , Rfl:   •  tadalafil (CIALIS) 5 MG tablet, Take 1 tablet (5 mg total) by mouth in the morning, Disp: 30 tablet, Rfl: 6    No Known Allergies         Vitals:    07/25/23 0909   BP: 113/66   Pulse: 83       Objective:                    Right Knee Exam     Muscle Strength   The patient has normal right knee strength.     Tenderness   The patient is experiencing tenderness in the medial joint line. Range of Motion   Extension: 5   Flexion: 120 (With crepitation and stiffness)     Other   Erythema: absent  Sensation: normal  Effusion: no effusion present      Left Knee Exam     Muscle Strength   The patient has normal left knee strength. Tenderness   The patient is experiencing tenderness in the medial joint line. Range of Motion   Extension: 0   Flexion: 120 (With crepitation and stiffness)     Other   Erythema: absent  Sensation: normal  Effusion: no effusion present    Comments: Both knees remain in mild varus alignment with bony enlargement medially. Diagnostics, reviewed and taken today if performed as documented:    None performed        Procedures, if performed today:    Large joint arthrocentesis: bilateral knee  Universal Protocol:  Consent: Verbal consent obtained. Risks and benefits: risks, benefits and alternatives were discussed  Consent given by: patient  Time out: Immediately prior to procedure a "time out" was called to verify the correct patient, procedure, equipment, support staff and site/side marked as required.   Timeout called at: 7/25/2023 9:32 AM.  Patient understanding: patient states understanding of the procedure being performed  Site marked: the operative site was marked  Patient identity confirmed: verbally with patient    Supporting Documentation  Indications: pain and diagnostic evaluation   Procedure Details  Location: knee - bilateral knee  Preparation: Patient was prepped and draped in the usual sterile fashion  Needle size: 22 G  Ultrasound guidance: no    Medications (Right): 2 mL bupivacaine 0.25 %; 12 mg betamethasone acetate-betamethasone sodium phosphate 6 (3-3) mg/mL; 2 mL lidocaine 1 %Medications (Left): 2 mL bupivacaine 0.25 %; 12 mg betamethasone acetate-betamethasone sodium phosphate 6 (3-3) mg/mL; 2 mL lidocaine 1 %   Patient tolerance: patient tolerated the procedure well with no immediate complications  Dressing:  Sterile dressing applied              Portions of the record may have been created with voice recognition software. Occasional wrong word or "sound a like" substitutions may have occurred due to the inherent limitations of voice recognition software. Read the chart carefully and recognize, using context, where substitutions have occurred.

## 2023-07-30 PROBLEM — Z12.5 PROSTATE CANCER SCREENING: Status: RESOLVED | Noted: 2023-05-31 | Resolved: 2023-07-30

## 2023-07-31 ENCOUNTER — PROCEDURE VISIT (OUTPATIENT)
Dept: UROLOGY | Facility: AMBULATORY SURGERY CENTER | Age: 88
End: 2023-07-31
Payer: MEDICARE

## 2023-07-31 VITALS
TEMPERATURE: 76 F | SYSTOLIC BLOOD PRESSURE: 122 MMHG | OXYGEN SATURATION: 97 % | BODY MASS INDEX: 20.37 KG/M2 | WEIGHT: 142 LBS | DIASTOLIC BLOOD PRESSURE: 78 MMHG

## 2023-07-31 DIAGNOSIS — R97.20 ELEVATED PSA: Primary | ICD-10-CM

## 2023-07-31 PROCEDURE — 55700 PR PROSTATE NEEDLE BIOPSY ANY APPROACH: CPT | Performed by: UROLOGY

## 2023-07-31 PROCEDURE — 96372 THER/PROPH/DIAG INJ SC/IM: CPT

## 2023-07-31 PROCEDURE — G0416 PROSTATE BIOPSY, ANY MTHD: HCPCS | Performed by: SPECIALIST

## 2023-07-31 PROCEDURE — 76942 ECHO GUIDE FOR BIOPSY: CPT | Performed by: UROLOGY

## 2023-07-31 PROCEDURE — 88344 IMHCHEM/IMCYTCHM EA MLT ANTB: CPT | Performed by: SPECIALIST

## 2023-07-31 RX ORDER — CEFTRIAXONE 1 G/1
1000 INJECTION, POWDER, FOR SOLUTION INTRAMUSCULAR; INTRAVENOUS ONCE
Status: COMPLETED | OUTPATIENT
Start: 2023-07-31 | End: 2023-07-31

## 2023-07-31 RX ADMIN — CEFTRIAXONE 1000 MG: 1 INJECTION, POWDER, FOR SOLUTION INTRAMUSCULAR; INTRAVENOUS at 10:05

## 2023-07-31 NOTE — PROGRESS NOTES
Prostate Biopsy note: The patient returns to the office today to undergo a transrectal ultrasound-guided biopsy of the prostate secondary to an elevated PSA. Risk and benefits of the procedure were discussed. Informed consent was obtained. The patient's prebiopsy preparation was deemed to be adequate. Rocephin IM given. If appropriate blood thinners had been placed on hold. The patient completed an enema as prescribed. The patient was placed in the lateral decubitus position. Digital rectal examination was performed revealing a 30 gram prostate with nodule at right base. Viscous lidocaine jelly was instilled into the rectum. Transrectal ultrasonography was then performed. The prostate measured 29 grams with possible right base and mid abnormality. 5 cc of 2% lidocaine were then injected bilaterally between the junction of the base of the prostate and the seminal vesicles. Ultrasound guidance was utilized to place the needle into proper position for the administration of the local anesthetic. A standard 12 core biopsy was then performed. Overall the patient tolerated the procedure and there were no complications.

## 2023-08-02 PROCEDURE — 88344 IMHCHEM/IMCYTCHM EA MLT ANTB: CPT | Performed by: SPECIALIST

## 2023-08-02 PROCEDURE — G0416 PROSTATE BIOPSY, ANY MTHD: HCPCS | Performed by: SPECIALIST

## 2023-08-08 DIAGNOSIS — N52.8 OTHER MALE ERECTILE DYSFUNCTION: ICD-10-CM

## 2023-08-08 DIAGNOSIS — R35.1 BENIGN PROSTATIC HYPERPLASIA WITH NOCTURIA: ICD-10-CM

## 2023-08-08 DIAGNOSIS — N40.1 BENIGN PROSTATIC HYPERPLASIA WITH NOCTURIA: ICD-10-CM

## 2023-08-08 RX ORDER — TADALAFIL 5 MG/1
5 TABLET ORAL DAILY
Qty: 30 TABLET | Refills: 6 | Status: SHIPPED | OUTPATIENT
Start: 2023-08-08

## 2023-08-10 ENCOUNTER — HOSPITAL ENCOUNTER (OUTPATIENT)
Dept: CT IMAGING | Facility: HOSPITAL | Age: 88
Discharge: HOME | End: 2023-08-10
Payer: MEDICARE

## 2023-08-10 DIAGNOSIS — R91.8 ABNORMAL CT SCAN OF LUNG: ICD-10-CM

## 2023-08-10 PROCEDURE — G1004 CDSM NDSC: HCPCS

## 2023-08-10 PROCEDURE — 71260 CT THORAX DX C+: CPT

## 2023-08-10 RX ADMIN — IOHEXOL 85 ML: 350 INJECTION, SOLUTION INTRAVENOUS at 09:49

## 2023-08-11 ENCOUNTER — OFFICE VISIT (OUTPATIENT)
Dept: UROLOGY | Facility: AMBULATORY SURGERY CENTER | Age: 88
End: 2023-08-11
Payer: MEDICARE

## 2023-08-11 VITALS
DIASTOLIC BLOOD PRESSURE: 80 MMHG | BODY MASS INDEX: 20.04 KG/M2 | HEIGHT: 70 IN | RESPIRATION RATE: 18 BRPM | WEIGHT: 140 LBS | HEART RATE: 82 BPM | SYSTOLIC BLOOD PRESSURE: 118 MMHG | OXYGEN SATURATION: 99 %

## 2023-08-11 DIAGNOSIS — C61 PROSTATE CANCER (HCC): Primary | ICD-10-CM

## 2023-08-11 PROCEDURE — 99215 OFFICE O/P EST HI 40 MIN: CPT | Performed by: UROLOGY

## 2023-08-11 NOTE — PROGRESS NOTES
8/11/2023    Lupis Lopes  1935  [id number]        Assessment  Grade 3 prostate cancer, high-volume    Plan  We reviewed his biopsy results which reveal positive biopsies in 10 out of 12. He had cores Parul 4+3 = 7 grade 3, as well as grade 2 and 1 grade 1 core. These were noted throughout the prostate bilaterally with most aggressive disease on the left. We reviewed the grading and staging of prostate cancer in detail. She has done research as well. We discussed staging. He did have a CT a few months ago, but at this point I recommend PSMA PET/CT to definitively rule out metastasis. This will help to determine appropriate treatment for him. We discussed treatment options including observation, surgery, combined androgen deprivation and radiation therapy. I explained that he is not a candidate for surgery based on age. She was a bit disappointed that this is not an option, however explained this is more on age and life expectancy at his age. May be candidate for combined therapy. Observation is an option, however in light of his high PSA and pending PET scan results they may wish to be more aggressive. Also recommending radiation oncology consultation simultaneously. As long as there are not distant metastasis, I think combined therapy would be appropriate for him with single dose deprivation followed by definitive radiation therapy. He did ask about second opinion and this is certainly reasonable. .  They will consider this as well. All questions answered and they are comfortable with this plan. History of Present Illness  Sunny Sandhu is a 80 y.o. male who was being evaluated for weight loss. A CT scan  was performed of chest abdomen pelvis and no obvious abnormality was identified other than potential pulmonary inflammation or infection. PSA was then performed to further evaluate and this was noted to be significantly elevated at 31, and repeat 25. Examination revealed nodularity. He underwent an office-based prostate biopsy on 7/31 and returns today with his wife to discuss results. He is doing well without complaint. He has mild chronic urinary symptoms. Previously prescribed Cialis daily dose has not yet begun this.           Review of Systems  Review of Systems      Past Medical History  Past Medical History:   Diagnosis Date   • Abnormal loss of weight     last assessed: 4/24/13   • Diverticulitis    • Diverticulosis    • Type 2 diabetes mellitus ([address])        Past Social History  Past Surgical History:   Procedure Laterality Date   • APPENDECTOMY      Age 12   • CATARACT EXTRACTION      right 5/2002, left 12/2003   • COLON SURGERY      for diverticulitis   • COLONOSCOPY      done 5/5/2009, mild diverticulosis noted, recheck in 3 years   • FL INJECTION LEFT HIP (NON ARTHROGRAM)  11/1/2022   • FL INJECTION RIGHT HIP (NON ARTHROGRAM)  11/1/2022   • INNER EAR SURGERY Left     Cochlear device implantation; resolved: Sep 2012   • SHOULDER SURGERY  05/2007    right rotator cuff       Past Family History  Family History   Problem Relation Age of Onset   • Coronary artery disease Sister    • Diabetes Sister    • Diabetes Daughter        Past Social history  Social History     Socioeconomic History   • Marital status: /Civil Union     Spouse name: Not on file   • Number of children: Not on file   • Years of education: Not on file   • Highest education level: Not on file   Occupational History   • Not on file   Tobacco Use   • Smoking status: Never   • Smokeless tobacco: Never   Vaping Use   • Vaping Use: Never used   Substance and Sexual Activity   • Alcohol use: Yes     Comment: social drinker as per [employer]   • Drug use: No   • Sexual activity: Yes   Other Topics Concern   • Not on file   Social History Narrative   • Not on file     Social Determinants of Health     Financial Resource Strain: Low Risk  (3/28/2023)    Overall Financial Resource Strain (CARDIA)    • Difficulty of Paying Living Expenses: Not hard at all   Food Insecurity: Not on file   Transportation Needs: No Transportation Needs (3/28/2023)    PRAPARE - Transportation    • Lack of Transportation (Medical): No    • Lack of Transportation (Non-Medical): No   Physical Activity: Not on file   Stress: Not on file   Social Connections: Not on file   Intimate Partner Violence: Not on file   Housing Stability: Not on file     Social History     Tobacco Use   Smoking Status Never   Smokeless Tobacco Never       Current Medications  Current Outpatient Medications   Medication Sig Dispense Refill   • levothyroxine 75 mcg tablet TAKE 1 TABLET(75 MCG) BY MOUTH DAILY 90 tablet 3   • loratadine-pseudoephedrine (CLARITIN-D 24-HOUR)  mg per 24 hr tablet Take 1 tablet by mouth daily     • tadalafil (CIALIS) 5 MG tablet Take 1 tablet (5 mg total) by mouth in the morning 30 tablet 6   • fluticasone (FLONASE) 50 mcg/act nasal spray 1 spray into each nostril daily (Patient not taking: Reported on 5/3/2023) 16 g 1     No current facility-administered medications for this visit. Allergies  No Known Allergies    Past Medical History, Social History, Family History, medications and allergies were reviewed.     Vitals  Vitals:    08/11/23 1456   BP: 118/80   BP Location: Left arm   Patient Position: Sitting   Cuff Size: Standard   Pulse: 82   Resp: 18   SpO2: 99%   Weight: 63.5 kg (140 lb)   Height: 5' 10" (1.778 m)       Physical Exam  Physical Exam      Results  Lab Results   Component Value Date    PSA 25.48 (H) 06/14/2023    PSA 31.01 (H) 05/31/2023     Lab Results   Component Value Date    GLUCOSE 136 08/14/2015    CALCIUM 8.9 07/21/2023     08/14/2015    K 4.1 07/21/2023    CO2 28 07/21/2023     07/21/2023    BUN 15 07/21/2023    CREATININE 1.01 07/21/2023     Lab Results   Component Value Date    WBC 6.86 07/21/2023    HGB 13.3 07/21/2023    HCT 41.4 07/21/2023    MCV 90 07/21/2023     07/21/2023

## 2023-08-14 ENCOUNTER — TELEPHONE (OUTPATIENT)
Dept: UROLOGY | Facility: AMBULATORY SURGERY CENTER | Age: 88
End: 2023-08-14

## 2023-08-14 NOTE — TELEPHONE ENCOUNTER
Appointment scheduled for 9/22      Provider note:    Return for PSMA PET/CT, Rad onc consult, f/u after poss lupron.

## 2023-09-01 ENCOUNTER — HOSPITAL ENCOUNTER (OUTPATIENT)
Dept: NUCLEAR MEDICINE | Facility: HOSPITAL | Age: 88
End: 2023-09-01
Payer: MEDICARE

## 2023-09-01 DIAGNOSIS — C61 PROSTATE CANCER (HCC): ICD-10-CM

## 2023-09-01 PROCEDURE — 78815 PET IMAGE W/CT SKULL-THIGH: CPT

## 2023-09-01 PROCEDURE — G1004 CDSM NDSC: HCPCS

## 2023-09-01 PROCEDURE — A9595 HB PIFLUFOLASTAT F-18, DIAGNOSTIC, 1 MILLICURIE: HCPCS

## 2023-09-06 ENCOUNTER — CLINICAL SUPPORT (OUTPATIENT)
Dept: RADIATION ONCOLOGY | Facility: CLINIC | Age: 88
End: 2023-09-06
Attending: RADIOLOGY

## 2023-09-06 ENCOUNTER — RADIATION ONCOLOGY CONSULT (OUTPATIENT)
Dept: RADIATION ONCOLOGY | Facility: CLINIC | Age: 88
End: 2023-09-06
Attending: RADIOLOGY
Payer: MEDICARE

## 2023-09-06 ENCOUNTER — TELEPHONE (OUTPATIENT)
Dept: RADIATION ONCOLOGY | Facility: CLINIC | Age: 88
End: 2023-09-06

## 2023-09-06 VITALS
HEIGHT: 70 IN | HEART RATE: 80 BPM | SYSTOLIC BLOOD PRESSURE: 107 MMHG | DIASTOLIC BLOOD PRESSURE: 68 MMHG | OXYGEN SATURATION: 98 % | TEMPERATURE: 97.8 F | BODY MASS INDEX: 20.64 KG/M2 | WEIGHT: 144.18 LBS

## 2023-09-06 DIAGNOSIS — C61 PROSTATE CANCER (HCC): Primary | ICD-10-CM

## 2023-09-06 PROCEDURE — 99211 OFF/OP EST MAY X REQ PHY/QHP: CPT | Performed by: RADIOLOGY

## 2023-09-06 NOTE — TELEPHONE ENCOUNTER
Patient has been seen for RT consult by Dr. Angelia Aggarwal. He is requesting hormones, fiducial markers and [employer]. Next follow up appointment with Dr. Ricki Ruiz is 9/22/23. His ADT will need to be long term as he has a positive pelvic lymph node.

## 2023-09-06 NOTE — PROGRESS NOTES
Consultation - Radiation Oncology     IVP:[id number] : 1935  Encounter: [id number]  Patient Information: Samuel Tuttle  Chief Complaint   Patient presents with   • Consult     Cancer Staging   Prostate cancer [facility])  Staging form: Prostate, AJCC 8th Edition  - Clinical stage from 2023: Stage RUBY (cT1c, cN1, cM0, PSA: 31, Grade Group: 3) - Signed by Erik Sy MD on 2023  Histopathologic type: Adenocarcinoma, NOS  Stage prefix: Initial diagnosis  Prostate specific antigen (PSA) range: 20 or greater  Josephine primary pattern: 4  Parul secondary pattern: 3  Josephine score: 7  Histologic grading system: 5 grade system  Location of positive needle core biopsies: Both sides         History of Present Illness   Daisy Prakash is a 80y.o. year old male who presents for discussion of RT for recently diagnosed Parul 7 (4+3) prostate cancer, referred by Dr. Braulio Gustafson.  Additional medical problems include DM2, hypothyroidism,PVD, RBBB, Stage 3a CKD.     Initial visit with urology May 31, 2023 for possible hydronephrosis and mild UPJ obstruction that was seen on CT scan. Scan also noted BPH. Clinical examination revealed no nodules in the prostate. UA with C&S ordered as well as PSA screen. Initial PSA was elevated as well as 2 week repeat. Biopsy was recommended.     PSA TREND    PSA, Total   Latest Ref Rng 0.00 - 4.00 ng/mL   2023 31.01 (H)    2023 25.48 (H)       23  Tissue Exam - First biopsy  A. Prostate, right lateral base:  - Histologic Type: Acinar adenocarcinoma  - Parul score: 3 + 4 = 7  - Involvement: Involving 10% and 10% of  2 of 2 cores.   - Grade group: II  - Percentage of pattern 4: approximately 10%  - Perineural invasion: Absent     -  Multiplex immunohistochemical stain performed with appropriate controls shows malignant glands with loss of basal layer cells staining for p63 and high molecular weight keratin with luminal racemase expression, supporting the diagnosis.     B. Prostate, right medial base:  - Histologic Type: Acinar adenocarcinoma  - Parul score: 3 + 4 = 7  - Involvement: Involving 95% of 1 of 2 core. - Grade group: II  - Percentage of pattern 4: 5-10%  - Perineural invasion: Absent     -  Multiplex immunohistochemical stain performed with appropriate controls on block B2 shows benign glands with intact basal layer cells staining for p63 and high molecular weight keratin with negative luminal racemase expression, supporting the diagnosis.     C. Prostate, right lateral mid:  - Histologic Type: Acinar adenocarcinoma  - Parul score: 3 + 4 = 7  - Involvement: Involving 90% of 1 of 1 core. - Grade group: II  - Percentage of pattern 4: 15%  - Perineural invasion: Absent     D. Prostate, right medial mis:  - Histologic Type: Acinar adenocarcinoma  - [place] score: 3 + 4 = 7  - Involvement: Involving 60% of  1 of 1 core. - Grade group: II  - Percentage of pattern 4: 30%  - Perineural invasion: Absent     E. Prostate, right lateral apex:  - Histologic Type: Acinar adenocarcinoma  - [place] score: 3 + 4 = 7  - Involvement: Involving 95% of  1 of 1 core. - Grade group: II  - Percentage of pattern 4: 15%  - Perineural invasion: Absent     F. Prostate, right medial apex:  - Histologic Type: Acinar adenocarcinoma  - [place] score: 3 + 4 = 7  - Involvement: Involving 50% of 1 of 1 core. - Grade group: II  - Percentage of pattern 4: 25%  - Perineural invasion: Absent     G. Prostate, left lateral base:  - Benign prostatic tissue.  - Negative for malignancy. -  Multiplex immunohistochemical stain performed with appropriate controls shows benign glands with intact basal layer cells staining for p63 and high molecular weight keratin with negative luminal racemase expression, supporting the diagnosis.     H.  Prostate, left medial base:  - Benign prostatic tissue.  - Negative for malignancy.      -  Multiplex immunohistochemical stain performed with appropriate controls shows benign glands with intact basal layer cells staining for p63 and high molecular weight keratin with negative luminal racemase expression, supporting the diagnosis.     I. Prostate, left lateral mid:  - Histologic Type: Acinar adenocarcinoma  - Parul score: 3 + 3 = 6  - Involvement: Involving 20% of 1 of 1 core. - Grade group: I  - Perineural invasion: Absent     J. Prostate, left medial mid:  - Benign prostatic tissue.  - Negative for malignancy.      -  Multiplex immunohistochemical stain performed with appropriate controls shows benign glands with intact basal layer cells staining for p63 and high molecular weight keratin with negative luminal racemase expression, supporting the diagnosis.     K. Prostate, left lateral apex:  - Histologic Type: Acinar adenocarcinoma  - [place] score: 4 + 3 = 7  - Involvement: Involving 95% discontinuously of  1 of 1 core. - Grade group: III  - Percentage of pattern 4: 90%  - Perineural invasion: Absent      L. Prostate, left medial apex :  - Histologic Type: Acinar adenocarcinoma  - Parul score: 4 + 3 = 7  - Involvement: Involving 90% discontinuously of  1 of 1 core. - Grade group: III  - Percentage of pattern 4: 80%  - Perineural invasion: Absent    -  Multiplex immunohistochemical stain performed with appropriate controls shows malignant glands with loss of basal layer cells staining for p63 and high molecular weight keratin with luminal racemase expression, supporting the diagnosis     8/11/23  Dr. Maria Teresa Boswell  Grade 3, 10 of 12 cores positive . Recommend PSMA PET. Referral  to radiation oncology placed.       9/1/23  PSMA PET  IMPRESSION:  1. Heterogeneous multifocal tracer activity involving the prostate gland consistent with intraprostatic PSMA positive malignancy.   2. 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 3. Nonspecific multifocal tracer avid patchy pulmonary opacities involving both lungs most consistent with an inflammatory process. Short interval follow-up with CT of chest in 3 months is recommended to ensure stability and exclude less likely underlying PSMA positive malignancy.     Patient seen for consultation today with his wife who is 76years old. They have been  for 42 years. He was previously  for 28 years and then . He reports about a 10 pound weight loss over the last year due to to a decreased appetite and him overall eating less. He urrently weighs approximately 144 pounds down from 154 pounds. He has had skin cancer in the past with Mohs surgery and follows with Dr. Mary Perez. He denies any previous radiation therapy and no prior chemotherapy. He has stable nocturia 2-3 times per night without any hematuria and no dysuria. He reports a decreased urinary stream at times and has BPH. He has not been sexually active with no erections for several years. He was started on Cialis 5 mg daily in May 2023 for his enlarged prostate/BPH and for erectile dysfunction. Sildenafil was discontinued in May. He denies smoking any tobacco nor drinking any alcohol. He denies any COPD. He remains active walking his dog about 1 mile twice a day. He also plays golf once a week. 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 He was a  the cutting room. There was a lot of noise from the sawing machines. 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 He did not know his father nor his family history. His mother  of dementia at the age of 68. His mother had 4 children all with different men. He has 3 sisters with 2 of them being  1 from breast cancer.   He has 1 sister who is alive at the age of 80 and she is healthy. Patient and his wife have no children together. Although, he did adopt his second wife's son. He has a 30-year-old son and 59-year-old daughters that are healthy. He had a 54-year-old daughter that  from cerebral palsy. He has another daughter in her 46s and she is doing well. Upcomin23 Dr. Sidney Cannon  10/3/23 Dr. Mindy Perez   Oncology History   Prostate cancer [facility])   2023 Initial Diagnosis    Prostate cancer ([address] [facility])     2023 Biopsy    A. Prostate, right lateral base:  - Histologic Type: Acinar adenocarcinoma  - [place] score: 3 + 4 = 7  - Involvement: Involving 10% and 10% of  2 of 2 cores. - Grade group: II  - Percentage of pattern 4: approximately 10%  - Perineural invasion: Absent     -  Multiplex immunohistochemical stain performed with appropriate controls shows malignant glands with loss of basal layer cells staining for p63 and high molecular weight keratin with luminal racemase expression, supporting the diagnosis. B. Prostate, right medial base:  - Histologic Type: Acinar adenocarcinoma  - [place] score: 3 + 4 = 7  - Involvement: Involving 95% of 1 of 2 core. - Grade group: II  - Percentage of pattern 4: 5-10%  - Perineural invasion: Absent      -  Multiplex immunohistochemical stain performed with appropriate controls on block B2 shows benign glands with intact basal layer cells staining for p63 and high molecular weight keratin with negative luminal racemase expression, supporting the diagnosis. C. Prostate, right lateral mid:  - Histologic Type: Acinar adenocarcinoma  - Parul score: 3 + 4 = 7  - Involvement: Involving 90% of 1 of 1 core. - Grade group: II  - Percentage of pattern 4: 15%  - Perineural invasion: Absent     D. Prostate, right medial mis:  - Histologic Type: Acinar adenocarcinoma  - Parul score: 3 + 4 = 7  - Involvement: Involving 60% of  1 of 1 core.   - Grade group: II  - Percentage of pattern 4: 30%  - Perineural invasion: Absent     E. Prostate, right lateral apex:  - Histologic Type: Acinar adenocarcinoma  - Parul score: 3 + 4 = 7  - Involvement: Involving 95% of  1 of 1 core. - Grade group: II  - Percentage of pattern 4: 15%  - Perineural invasion: Absent     F. Prostate, right medial apex:  - Histologic Type: Acinar adenocarcinoma  - [place] score: 3 + 4 = 7  - Involvement: Involving 50% of 1 of 1 core. - Grade group: II  - Percentage of pattern 4: 25%  - Perineural invasion: Absent     G. Prostate, left lateral base:  - Benign prostatic tissue.  - Negative for malignancy. -  Multiplex immunohistochemical stain performed with appropriate controls shows benign glands with intact basal layer cells staining for p63 and high molecular weight keratin with negative luminal racemase expression, supporting the diagnosis. H. Prostate, left medial base:  - Benign prostatic tissue.  - Negative for malignancy. -  Multiplex immunohistochemical stain performed with appropriate controls shows benign glands with intact basal layer cells staining for p63 and high molecular weight keratin with negative luminal racemase expression, supporting the diagnosis. I. Prostate, left lateral mid:  - Histologic Type: Acinar adenocarcinoma  - [place] score: 3 + 3 = 6  - Involvement: Involving 20% of 1 of 1 core. - Grade group: I  - Perineural invasion: Absent     J. Prostate, left medial mid:  - Benign prostatic tissue.  - Negative for malignancy. -  Multiplex immunohistochemical stain performed with appropriate controls shows benign glands with intact basal layer cells staining for p63 and high molecular weight keratin with negative luminal racemase expression, supporting the diagnosis. K. Prostate, left lateral apex:  - Histologic Type: Acinar adenocarcinoma  - [place] score: 4 + 3 = 7  - Involvement: Involving 95% discontinuously of  1 of 1 core.   - Grade group: III  - Percentage of pattern 4: 90%  - Perineural invasion: Absent      L. Prostate, left medial apex :  - Histologic Type: Acinar adenocarcinoma  - Parul score: 4 + 3 = 7  - Involvement: Involving 90% discontinuously of  1 of 1 core. - Grade group: III  - Percentage of pattern 4: 80%  - Perineural invasion: Absent      -  Multiplex immunohistochemical stain performed with appropriate controls shows malignant glands with loss of basal layer cells staining for p63 and high molecular weight keratin with luminal racemase expression, supporting the diagnosis. 9/6/2023 -  Cancer Staged    Staging form: Prostate, AJCC 8th Edition  - Clinical stage from 9/6/2023: Stage RUBY (cT1c, cN1, cM0, PSA: 31, Grade Group: 3) - Signed by Elsi Jaquez MD on 9/6/2023  Histopathologic type:  Adenocarcinoma, NOS  Stage prefix: Initial diagnosis  Prostate specific antigen (PSA) range: 20 or greater  [place] primary pattern: 4  [place] secondary pattern: 3  Parul score: 7  Histologic grading system: 5 grade system  Location of positive needle core biopsies: Both sides             Past Medical History:   Diagnosis Date   • Abnormal loss of weight     last assessed: 4/24/13   • Diverticulitis    • Diverticulosis    • Prostate cancer [facility])    • Skin cancer    • Type 2 diabetes mellitus ([address] [facility])      Past Surgical History:   Procedure Laterality Date   • APPENDECTOMY      Age 12   • CATARACT EXTRACTION      right 5/2002, left 12/2003   • COLON SURGERY      for diverticulitis   • COLONOSCOPY      done 5/5/2009, mild diverticulosis noted, recheck in 3 years   • FL INJECTION LEFT HIP (NON ARTHROGRAM)  11/01/2022   • FL INJECTION RIGHT HIP (NON ARTHROGRAM)  11/01/2022   • INNER EAR SURGERY Left     Cochlear device implantation; resolved: Sep 2012   • MOHS SURGERY Left     left wrist   • SHOULDER SURGERY  05/2007    right rotator cuff       Family History   Problem Relation Age of Onset   • Coronary artery disease Sister    • Diabetes Sister    • Breast cancer Sister 79   • Diabetes Daughter        Social History   Social History     Substance and Sexual Activity   Alcohol Use Yes    Comment: social drinker as per [employer]     Social History     Substance and Sexual Activity   Drug Use No     Social History     Tobacco Use   Smoking Status Never   Smokeless Tobacco Never     Meds/Allergies     Current Outpatient Medications:   •  levothyroxine 75 mcg tablet, TAKE 1 TABLET(75 MCG) BY MOUTH DAILY, Disp: 90 tablet, Rfl: 3  •  loratadine-pseudoephedrine (CLARITIN-D 24-HOUR)  mg per 24 hr tablet, Take 1 tablet by mouth daily, Disp: , Rfl:   •  tadalafil (CIALIS) 5 MG tablet, Take 1 tablet (5 mg total) by mouth in the morning, Disp: 30 tablet, Rfl: 6  •  fluticasone (FLONASE) 50 mcg/act nasal spray, 1 spray into each nostril daily (Patient not taking: Reported on 5/3/2023), Disp: 16 g, Rfl: 1  No Known Allergies    Review of Systems  Constitutional: Positive for fatigue and unexpected weight change. HENT: Positive for dental problem and hearing loss. Cochlear implant left side   Eyes: Negative. Glasses   Respiratory: Negative. Cardiovascular: Negative. Gastrointestinal: Negative. Genitourinary: Negative for dysuria, hematuria and urgency. Nocturia 2-3x. Reports decreased in urine stream   Musculoskeletal: Positive for myalgias (bilateral legs). Allergic/Immunologic: Positive for environmental allergies. Neurological: Positive for numbness (bilateral feet). Hematological: Bruises/bleeds easily. Psychiatric/Behavioral: Positive for sleep disturbance. The patient is nervous/anxious.       Clinical Trial: no     IPSS Questionnaire (AUA-7): Over the past month…     1)  How often have you had a sensation of not emptying your bladder completely after you finish urinating? 0 - Not at all   2)  How often have you had to urinate again less than two hours after you finished urinating?  2 - Less than half the time   3)  How often have you found you stopped and started again several times when you urinated? 5 - Almost always   4) How difficult have you found it to postpone urination? 2 - Less than half the time   5) How often have you had a weak urinary stream?  5 - Almost always   6) How often have you had to push or strain to begin urination? 0 - Not at all   7) How many times did you most typically get up to urinate from the time you went to bed until the time you got up in the morning? 3 - 3 times   Total Score:  17      OBJECTIVE:   /68 (BP Location: Left arm, Patient Position: Sitting, Cuff Size: Standard)   Pulse 80   Temp 97.8 °F (36.6 °C) (Temporal)   Ht 5' 10" (1.778 m)   Wt 65.4 kg (144 lb 2.9 oz)   SpO2 98%   BMI 20.69 kg/m²   Pain Assessment:  0  Performance Status: ECOG/Zubrod/WHO: 1 - Symptomatic but completely ambulatory    Physical Exam  Vitals and nursing note reviewed. Constitutional:       General: He is not in acute distress. Appearance: He is well-developed. He is not diaphoretic. HENT:      Head: Normocephalic and atraumatic. Mouth/Throat:      Pharynx: No oropharyngeal exudate. Eyes:      General: No scleral icterus. Conjunctiva/sclera: Conjunctivae normal.      Pupils: Pupils are equal, round, and reactive to light. Neck:      Thyroid: No thyromegaly. Trachea: No tracheal deviation. Cardiovascular:      Rate and Rhythm: Normal rate and regular rhythm. Heart sounds: Normal heart sounds. Pulmonary:      Effort: Pulmonary effort is normal. No respiratory distress. Breath sounds: Normal breath sounds. No stridor. No wheezing, rhonchi or rales. Chest:      Chest wall: No tenderness. Abdominal:      General: Abdomen is flat. Bowel sounds are normal. There is no distension. Palpations: Abdomen is soft. There is no mass. Tenderness: There is no abdominal tenderness. Hernia: No hernia is present.    Genitourinary:     Comments: Rectal examination deferred. Musculoskeletal:         General: No swelling or tenderness. Normal range of motion. Cervical back: Normal range of motion and neck supple. Lymphadenopathy:      Cervical: No cervical adenopathy. Upper Body:      Right upper body: No supraclavicular adenopathy. Left upper body: No supraclavicular adenopathy. Lower Body: No right inguinal adenopathy. No left inguinal adenopathy. Skin:     General: Skin is warm and dry. Coloration: Skin is not jaundiced or pale. Findings: No erythema or rash. Neurological:      General: No focal deficit present. Mental Status: He is alert and oriented to person, place, and time. Cranial Nerves: No cranial nerve deficit. Sensory: No sensory deficit. Motor: No weakness. Coordination: Coordination normal.   Psychiatric:         Behavior: Behavior normal.         Thought Content: Thought content normal.         Judgment: Judgment normal.       RESULTS  Lab Results  Lab Results   Component Value Date    PSA 25.48 (H) 06/14/2023    PSA 31.01 (H) 05/31/2023     Imaging Studies  NM PET CT skull base to mid thigh    Result Date: 9/5/2023  Narrative: PYLARIFY PSMA PET/CT SCAN INDICATION:  C61: Malignant neoplasm of prostate   , staging MODIFIER: PI COMPARISON: CT of chest dated August 10, 2023 and CT of chest, abdomen, and pelvis dated 5/5/2023 CELL TYPE:  acinar prostatic adenocarcinoma, Parul 6, 7, group 1, 2, 3 (bx: 7/31/23 prostate +) TECHNIQUE:   9.1 mCi Pylarify PSMA administered IV. Multiplanar attenuation corrected and non attenuation corrected PET images were acquired 60 minutes post injection. Contiguous, low dose, axial CT sections were obtained from the vertex through the femurs . Intravenous or oral contrast was not utilized.   This examination, like all CT scans performed in the [facility], was performed utilizing techniques to minimize radiation dose exposure, including the use of iterative reconstruction and automated exposure control. FINDINGS: VISUALIZED BRAIN: No acute abnormalities are seen. HEAD/NECK: There is a physiologic distribution of the radiotracer. No PSMA avid cervical adenopathy is seen. CT images: Unremarkable. CHEST: Asymmetric patchy tracer activity associated with asymmetric patchy opacity involving the right lung apex most consistent with inflammatory tracer activity and likely reflecting fibronodular scarring; attention to this region on short interval follow-up imaging is recommended to ensure stability and exclude underlying right apical malignancy. Additional mild patchy tracer activity is associated with slightly nodular lingular infiltrate (for example image 103), also most suggestive of an inflammatory process with underlying tracer avid malignancy considered less likely. Attention to this region on short interval follow-up imaging is recommended to ensure stability. Additional patchy fairly intense tracer activity associated with right middle lobe opacity with traction bronchiectasis is noted (for example image 116 of series 4) with max SUV of 6.1. Inflammatory tracer activity is favored with underlying tracer avid malignancy considered less likely given the morphologic appearance on CT T. Attention to this region on short interval follow-up imaging is recommended to ensure stability. CT images: Atherosclerotic vascular calcifications including those of the coronary arteries are noted. Calcified left hilar lymph node consistent prior granulomatous disease. Left lower lobe calcified granuloma. ABDOMEN: Significant nonuniform tracer activity involving the distal stomach/small bowel. CT images: Atherosclerotic vascular calcifications are noted. Scattered splenic calcification consistent prior granulomatous disease. Nonspecific mural thickening of the gastric wall which could be related to underdistention with underlying gastric mucosal process not excluded.  Probable duodenal diverticulum. Sigmoid anastomosis. Diverticulosis coli. PELVIS: Intense heterogeneous multifocal tracer activity involving the bilateral aspects of the prostate gland consistent with intraprostatic PSMA positive malignancy with max SUV of approximately 36. Best seen on image 200, is intense focal tracer activity associated with a 6 mm left pelvic soft tissue nodule adjacent to the left external iliac vessels with max SUV of 8.4 most consistent with PSMA positive left pelvic leilani metastatic disease. CT images: Unremarkable. OSSEOUS STRUCTURES: No PSMA avid lesions are seen. CT images: Degenerative changes are noted involving the spine. Impression: 1. Heterogeneous multifocal tracer activity involving the prostate gland consistent with intraprostatic PSMA positive malignancy. 2. 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 3. Nonspecific multifocal tracer avid patchy pulmonary opacities involving both lungs most consistent with an inflammatory process. Short interval follow-up with CT of chest in 3 months is recommended to ensure stability and exclude less likely underlying PSMA positive malignancy. Please see above for details and additional findings. The study was marked in EPIC for significant notification. The study was marked in Epic for follow-up. Workstation performed: [id number]     CT chest w contrast    Result Date: 8/17/2023  Narrative: CT CHEST WITH IV CONTRAST INDICATION:   R91.8: Other nonspecific abnormal finding of lung field. COMPARISON: CT chest abdomen pelvis dated May 5, 2023. TECHNIQUE: CT examination of the chest was performed. Multiplanar 2D reformatted images were created from the source data.  This examination, like all CT scans performed in the [facility], was performed utilizing techniques to minimize radiation dose exposure, including the use of iterative reconstruction and automated exposure control. Radiation dose length product (DLP) for this visit:  218 mGy-cm IV Contrast:  85 mL of iohexol (OMNIPAQUE) FINDINGS: LUNGS: Minimal groundglass opacification in the periphery of the left upper lobe improved since comparison study (series 3 image 145). Again demonstrated are scattered benign granulomas (series 3 image 130). No change in scarring and traction bronchiectasis of the base of right middle lobe. No change in bilateral apical scarring. There is no tracheal or endobronchial lesion. PLEURA:  Unremarkable. HEART/GREAT VESSELS: Heart is unremarkable for patient's age. Mild atherosclerosis of aorta. No thoracic aortic aneurysm. MEDIASTINUM AND PAM:  Unremarkable. CHEST WALL AND LOWER NECK:  Unremarkable. VISUALIZED STRUCTURES IN THE UPPER ABDOMEN:  Unremarkable. OSSEOUS STRUCTURES:  No acute fracture or destructive osseous lesion. Impression: 1. Slight interval improvement in infectious/inflammatory process of the left upper lobe. 2. Tubular bronchiectatic changes and scarring with some peripheral tree-in-bud opacity most notable in the right middle lobe and to a lesser degree in the lingula unchanged from previous examination and most consistent with nontuberculous mycobacterial infection. 3. No acute cardiopulmonary process. Resident: Sukhi Juares, the attending radiologist, have reviewed the images and agree with the final report above. Workstation performed: [id number]     Pathology: See Above    ASSESSMENT  1. Prostate cancer [facility])  Ambulatory referral to Radiation Oncology    Radiation Simulation Treatment        Cancer Staging   Prostate cancer [facility])  Staging form: Prostate, AJCC 8th Edition  - Clinical stage from 9/6/2023: Stage RUBY (cT1c, cN1, cM0, PSA: 31, Grade Group: 3) - Signed by Yanely Bobo MD on 9/6/2023  Histopathologic type:  Adenocarcinoma, NOS  Stage prefix: Initial diagnosis  Prostate specific antigen (PSA) range: 20 or greater  [place] primary pattern: 4  [place] secondary pattern: 3  Parul score: 7  Histologic grading system: 5 grade system  Location of positive needle core biopsies: Both sides      Risk Category: high  Bone Scan: yes - PSMA PET-CT  Androgen Deprivation Therapy: yes - Due to start soon      PLAN/DISCUSSION  Orders Placed This Encounter   Procedures   • Radiation Simulation Treatment          Alana Public is a 80y.o. year old male with clinical stage RUBY, cT1c, cN1, M0 [place] score 4+3=7 prostate adenocarcinoma diagnosed after an elevated PSA of 31.01 May 31, 2023 that was repeated June 14, 2023 and was 25.48. He reports this was his first PSA level. He has a long history of BPH. He had his first prostate biopsies on July 31, 2023 that revealed [place] score 4+3=7 disease in addition to Parul score 3+4=7 and 3+3=6 disease in multiple biopsies bilaterally. There were a total of 10 out of 12 biopsy cores that were positive. Patient was seen by Dr. Lashonda Ann and PSMA PET/CT was ordered and performed on September 1, 2023. This revealed heterogeneous multifocal tracer activity involving the prostate gland consistent with malignancy. 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 There was some nonspecific patchy pulmonary opacities involving both lungs consistent with an inflammatory process. PET/CT images results were reviewed today with the patient and his wife. He did have a chest CT August 10, 2023 to follow-up on CAT scans of the chest, abdomen, and pelvis from May 5, 2023. There was interval improvement in the inflammatory/infectious process in the left upper lobe.  There were tubular bronchiectatic changes and scarring with some peripheral tree-in-bud opacity most notable in the right middle lobe and to a lesser degree in the lingula unchanged from previous examination and most consistent with nontuberculous mycobacterial infection. There is no evidence of any acute cardiopulmonary process as well as no metastatic disease in the chest.  He is having no respiratory symptoms. Dr. Nomi Miner had discussed sending him for pulmonary evaluation pending results of the PET/CT. He does not appear to have any acute process and this is probably not necessary at this time. We discussed his diagnosis including staging with positive lymph nodes within the pelvis with him and his wife. We discussed that all of his disease can be treated with radiation therapy which would include radiation to the whole pelvis along with a boost to the lymph node and his prostate primary. This will be a total of 9 weeks/44 fractions of radiation therapy. We also recommend androgen deprivation therapy 2 to 3 months prior to the start of radiation therapy and then continue this for a total of 2 years because of him having lymph node positive stage IV disease. We discussed the rational for radiation therapy to the acute side effects and the potential chronic complications with him. We discussed placement of fiducial markers for daily image guided radiation therapy to improve the accuracy of treatment and reduce both short-term and long-term side effects. We discussed placement of a SpaceOAR to reduce dose to the rectum and prevent side effects. He understands he is not a surgical candidate due to his age. We did discuss active surveillance which he declines. He will return to the [address] [place] [address] after he receives ADT, fiducial markers, and SpaceOAR.       Gentry Waldrop MD  9/6/2023,2:30 PM      Portions of the record may have been created with voice recognition software.  Occasional wrong word or "sound a like" substitutions may have occurred due to the inherent limitations of voice recognition software.  Read the chart carefully and recognize, using context, where substitutions have occurred.

## 2023-09-06 NOTE — PROGRESS NOTES
Tim Hadley 1935 is a 80 y.o. male Presents for discussion of RT for recently diagnosed Parul 7 (4+3) prostate cancer, referred by Dr. Marya Lassiter.  Additional medical problems include DM2, hypothyroidism,PVD, RBBB, Stage 3a CKD. Initial visit with urology for possible hydronephrosis and mild UPJ obstruction that was seen on CT scan. Scan also noted BPH. UA with C&S ordered as well as PSA screen. Initial PSA was elevated as well as 2 week repeat. Biopsy was recommended    PSA TREND   PSA, Total   Latest Ref Rng 0.00 - 4.00 ng/mL   5/31/2023 31.01 (H)    6/14/2023 25.48 (H)         7/31/23  Tissue Exam  A. Prostate, right lateral base:  - Histologic Type: Acinar adenocarcinoma  - Parul score: 3 + 4 = 7  - Involvement: Involving 10% and 10% of  2 of 2 cores. - Grade group: II  - Percentage of pattern 4: approximately 10%  - Perineural invasion: Absent     -  Multiplex immunohistochemical stain performed with appropriate controls shows malignant glands with loss of basal layer cells staining for p63 and high molecular weight keratin with luminal racemase expression, supporting the diagnosis. B. Prostate, right medial base:  - Histologic Type: Acinar adenocarcinoma  - [place] score: 3 + 4 = 7  - Involvement: Involving 95% of 1 of 2 core. - Grade group: II  - Percentage of pattern 4: 5-10%  - Perineural invasion: Absent      -  Multiplex immunohistochemical stain performed with appropriate controls on block B2 shows benign glands with intact basal layer cells staining for p63 and high molecular weight keratin with negative luminal racemase expression, supporting the diagnosis. C. Prostate, right lateral mid:  - Histologic Type: Acinar adenocarcinoma  - Parul score: 3 + 4 = 7  - Involvement: Involving 90% of 1 of 1 core. - Grade group: II  - Percentage of pattern 4: 15%  - Perineural invasion: Absent     D.  Prostate, right medial mis:  - Histologic Type: Acinar adenocarcinoma  - [place] score: 3 + 4 = 7  - Involvement: Involving 60% of  1 of 1 core. - Grade group: II  - Percentage of pattern 4: 30%  - Perineural invasion: Absent     E. Prostate, right lateral apex:  - Histologic Type: Acinar adenocarcinoma  - Parul score: 3 + 4 = 7  - Involvement: Involving 95% of  1 of 1 core. - Grade group: II  - Percentage of pattern 4: 15%  - Perineural invasion: Absent     F. Prostate, right medial apex:  - Histologic Type: Acinar adenocarcinoma  - [place] score: 3 + 4 = 7  - Involvement: Involving 50% of 1 of 1 core. - Grade group: II  - Percentage of pattern 4: 25%  - Perineural invasion: Absent     G. Prostate, left lateral base:  - Benign prostatic tissue.  - Negative for malignancy. -  Multiplex immunohistochemical stain performed with appropriate controls shows benign glands with intact basal layer cells staining for p63 and high molecular weight keratin with negative luminal racemase expression, supporting the diagnosis. H. Prostate, left medial base:  - Benign prostatic tissue.  - Negative for malignancy. -  Multiplex immunohistochemical stain performed with appropriate controls shows benign glands with intact basal layer cells staining for p63 and high molecular weight keratin with negative luminal racemase expression, supporting the diagnosis. I. Prostate, left lateral mid:  - Histologic Type: Acinar adenocarcinoma  - [place] score: 3 + 3 = 6  - Involvement: Involving 20% of 1 of 1 core. - Grade group: I  - Perineural invasion: Absent     J. Prostate, left medial mid:  - Benign prostatic tissue.  - Negative for malignancy. -  Multiplex immunohistochemical stain performed with appropriate controls shows benign glands with intact basal layer cells staining for p63 and high molecular weight keratin with negative luminal racemase expression, supporting the diagnosis.      K. Prostate, left lateral apex:  - Histologic Type: Acinar adenocarcinoma  - Parul score: 4 + 3 = 7  - Involvement: Involving 95% discontinuously of  1 of 1 core. - Grade group: III  - Percentage of pattern 4: 90%  - Perineural invasion: Absent      L. Prostate, left medial apex :  - Histologic Type: Acinar adenocarcinoma  - [place] score: 4 + 3 = 7  - Involvement: Involving 90% discontinuously of  1 of 1 core. - Grade group: III  - Percentage of pattern 4: 80%  - Perineural invasion: Absent      -  Multiplex immunohistochemical stain performed with appropriate controls shows malignant glands with loss of basal layer cells staining for p63 and high molecular weight keratin with luminal racemase expression, supporting the diagnosis    23  Dr. Jhonny Sharp  Grade 3 10 of 12 cores positive . Recommend PSMA PET. Referral  to radiation oncology placed. 23  PSMA PET  IMPRESSION:  1. Heterogeneous multifocal tracer activity involving the prostate gland consistent with intraprostatic PSMA positive malignancy. 2. 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 3. Nonspecific multifocal tracer avid patchy pulmonary opacities involving both lungs most consistent with an inflammatory process. Short interval follow-up with CT of chest in 3 months is recommended to ensure stability and exclude less likely underlying PSMA positive malignancy. [email or web address] [facility]          Oncology History   Prostate cancer ([address])   2023 Initial Diagnosis    Prostate cancer ([address])     2023 Biopsy    A. Prostate, right lateral base:  - Histologic Type: Acinar adenocarcinoma  - Parul score: 3 + 4 = 7  - Involvement: Involving 10% and 10% of  2 of 2 cores.   - Grade group: II  - Percentage of pattern 4: approximately 10%  - Perineural invasion: Absent     -  Multiplex immunohistochemical stain performed with appropriate controls shows malignant glands with loss of basal layer cells staining for p63 and high molecular weight keratin with luminal racemase expression, supporting the diagnosis. B. Prostate, right medial base:  - Histologic Type: Acinar adenocarcinoma  - [place] score: 3 + 4 = 7  - Involvement: Involving 95% of 1 of 2 core. - Grade group: II  - Percentage of pattern 4: 5-10%  - Perineural invasion: Absent      -  Multiplex immunohistochemical stain performed with appropriate controls on block B2 shows benign glands with intact basal layer cells staining for p63 and high molecular weight keratin with negative luminal racemase expression, supporting the diagnosis. C. Prostate, right lateral mid:  - Histologic Type: Acinar adenocarcinoma  - Parul score: 3 + 4 = 7  - Involvement: Involving 90% of 1 of 1 core. - Grade group: II  - Percentage of pattern 4: 15%  - Perineural invasion: Absent     D. Prostate, right medial mis:  - Histologic Type: Acinar adenocarcinoma  - [place] score: 3 + 4 = 7  - Involvement: Involving 60% of  1 of 1 core. - Grade group: II  - Percentage of pattern 4: 30%  - Perineural invasion: Absent     E. Prostate, right lateral apex:  - Histologic Type: Acinar adenocarcinoma  - Parul score: 3 + 4 = 7  - Involvement: Involving 95% of  1 of 1 core. - Grade group: II  - Percentage of pattern 4: 15%  - Perineural invasion: Absent     F. Prostate, right medial apex:  - Histologic Type: Acinar adenocarcinoma  - [place] score: 3 + 4 = 7  - Involvement: Involving 50% of 1 of 1 core. - Grade group: II  - Percentage of pattern 4: 25%  - Perineural invasion: Absent     G. Prostate, left lateral base:  - Benign prostatic tissue.  - Negative for malignancy. -  Multiplex immunohistochemical stain performed with appropriate controls shows benign glands with intact basal layer cells staining for p63 and high molecular weight keratin with negative luminal racemase expression, supporting the diagnosis.      H. Prostate, left medial base:  - Benign prostatic tissue.  - Negative for malignancy. -  Multiplex immunohistochemical stain performed with appropriate controls shows benign glands with intact basal layer cells staining for p63 and high molecular weight keratin with negative luminal racemase expression, supporting the diagnosis. I. Prostate, left lateral mid:  - Histologic Type: Acinar adenocarcinoma  - [place] score: 3 + 3 = 6  - Involvement: Involving 20% of 1 of 1 core. - Grade group: I  - Perineural invasion: Absent     J. Prostate, left medial mid:  - Benign prostatic tissue.  - Negative for malignancy. -  Multiplex immunohistochemical stain performed with appropriate controls shows benign glands with intact basal layer cells staining for p63 and high molecular weight keratin with negative luminal racemase expression, supporting the diagnosis. K. Prostate, left lateral apex:  - Histologic Type: Acinar adenocarcinoma  - [place] score: 4 + 3 = 7  - Involvement: Involving 95% discontinuously of  1 of 1 core. - Grade group: III  - Percentage of pattern 4: 90%  - Perineural invasion: Absent      L. Prostate, left medial apex :  - Histologic Type: Acinar adenocarcinoma  - [place] score: 4 + 3 = 7  - Involvement: Involving 90% discontinuously of  1 of 1 core. - Grade group: III  - Percentage of pattern 4: 80%  - Perineural invasion: Absent      -  Multiplex immunohistochemical stain performed with appropriate controls shows malignant glands with loss of basal layer cells staining for p63 and high molecular weight keratin with luminal racemase expression, supporting the diagnosis. Review of Systems:  Review of Systems   Constitutional: Positive for fatigue and unexpected weight change. HENT: Positive for dental problem and hearing loss. Cochlear implant left side   Eyes: Negative. Glasses   Respiratory: Negative. Cardiovascular: Negative. Gastrointestinal: Negative. Genitourinary: Negative for dysuria, hematuria and urgency. Nocturia 2-3x. Reports decreased in urine stream   Musculoskeletal: Positive for myalgias (bilateral legs). Allergic/Immunologic: Positive for environmental allergies. Neurological: Positive for numbness (bilateral feet). Hematological: Bruises/bleeds easily. Psychiatric/Behavioral: Positive for sleep disturbance. The patient is nervous/anxious. Clinical Trial: no    IPSS Questionnaire (AUA-7): Over the past month…    1)  How often have you had a sensation of not emptying your bladder completely after you finish urinating? 0 - Not at all   2)  How often have you had to urinate again less than two hours after you finished urinating? 2 - Less than half the time   3)  How often have you found you stopped and started again several times when you urinated? 5 - Almost always   4) How difficult have you found it to postpone urination? 2 - Less than half the time   5) How often have you had a weak urinary stream?  5 - Almost always   6) How often have you had to push or strain to begin urination? 0 - Not at all   7) How many times did you most typically get up to urinate from the time you went to bed until the time you got up in the morning?   3 - 3 times   Total Score:  17       Pain assessment: 0      Prior Radiation: NONE    Teaching  [facility] radiation oncology booklet given and reviewed    MST completed    Implantable Devices (Port, pacemaker, pain stimulator): left cochlear implant    Hip Replacement: none    Health Maintenance   Topic Date Due   • PT PLAN OF CARE  Never done   • Diabetic Foot Exam  08/13/2022   • DM Eye Exam  11/24/2022   • COVID-19 Vaccine (5 - Moderna series) 03/14/2023   • Influenza Vaccine (1) 09/01/2023   • HEMOGLOBIN A1C  01/21/2024   • Fall Risk  03/28/2024   • Depression Screening  03/28/2024   • Medicare Annual Wellness Visit (AWV)  03/28/2024   • BMI: Adult  08/11/2024   • Pneumococcal Vaccine: 65+ Years  Completed   • HIB Vaccine  Aged Out   • IPV Vaccine  Aged Out   • Hepatitis A Vaccine  Aged Out   • Meningococcal ACWY Vaccine  Aged Out   • HPV Vaccine  Aged Out       Past Medical History:   Diagnosis Date   • Abnormal loss of weight     last assessed: 4/24/13   • Diverticulitis    • Diverticulosis    • Type 2 diabetes mellitus ([address])        Past Surgical History:   Procedure Laterality Date   • APPENDECTOMY      Age 12   • CATARACT EXTRACTION      right 5/2002, left 12/2003   • COLON SURGERY      for diverticulitis   • COLONOSCOPY      done 5/5/2009, mild diverticulosis noted, recheck in 3 years   • FL INJECTION LEFT HIP (NON ARTHROGRAM)  11/1/2022   • FL INJECTION RIGHT HIP (NON ARTHROGRAM)  11/1/2022   • INNER EAR SURGERY Left     Cochlear device implantation; resolved: Sep 2012   • SHOULDER SURGERY  05/2007    right rotator cuff       Family History   Problem Relation Age of Onset   • Coronary artery disease Sister    • Diabetes Sister    • Diabetes Daughter        Social History     Tobacco Use   • Smoking status: Never   • Smokeless tobacco: Never   Vaping Use   • Vaping Use: Never used   Substance Use Topics   • Alcohol use: Yes     Comment: social drinker as per [employer]   • Drug use: No          Current Outpatient Medications:   •  fluticasone (FLONASE) 50 mcg/act nasal spray, 1 spray into each nostril daily (Patient not taking: Reported on 5/3/2023), Disp: 16 g, Rfl: 1  •  levothyroxine 75 mcg tablet, TAKE 1 TABLET(75 MCG) BY MOUTH DAILY, Disp: 90 tablet, Rfl: 3  •  loratadine-pseudoephedrine (CLARITIN-D 24-HOUR)  mg per 24 hr tablet, Take 1 tablet by mouth daily, Disp: , Rfl:   •  tadalafil (CIALIS) 5 MG tablet, Take 1 tablet (5 mg total) by mouth in the morning, Disp: 30 tablet, Rfl: 6    No Known Allergies     There were no vitals filed for this visit.

## 2023-09-07 ENCOUNTER — TELEPHONE (OUTPATIENT)
Dept: NUTRITION | Facility: CLINIC | Age: 88
End: 2023-09-07

## 2023-09-07 NOTE — TELEPHONE ENCOUNTER
Received notification by [facility] RN, Jaclyn Yousif. on 9/6/23 that pt has triggered for oncology nutrition care (reason for referral: Malnutrition Screening Tool (MST) Triggers: scored a 2 indicating 2-13# (0.9-6 kg) recent wt loss and is eating poorly due to a decreased appetite. (Date of MST: 9/6/23) and patient request due to wt loss). Yu Johnston today to establish care. No answer. Left voice message with the reason for today's call and this RD’s contact information asking that Susu Mcdermott call back as able/desired.

## 2023-09-08 ENCOUNTER — DOCUMENTATION (OUTPATIENT)
Dept: HEMATOLOGY ONCOLOGY | Facility: CLINIC | Age: 88
End: 2023-09-08

## 2023-09-08 ENCOUNTER — CLINICAL SUPPORT (OUTPATIENT)
Dept: UROLOGY | Facility: AMBULATORY SURGERY CENTER | Age: 88
End: 2023-09-08
Payer: MEDICARE

## 2023-09-08 VITALS
BODY MASS INDEX: 20.23 KG/M2 | WEIGHT: 141 LBS | OXYGEN SATURATION: 100 % | DIASTOLIC BLOOD PRESSURE: 78 MMHG | SYSTOLIC BLOOD PRESSURE: 120 MMHG | HEART RATE: 74 BPM

## 2023-09-08 DIAGNOSIS — C61 PROSTATE CANCER (HCC): Primary | ICD-10-CM

## 2023-09-08 PROCEDURE — 99214 OFFICE O/P EST MOD 30 MIN: CPT | Performed by: UROLOGY

## 2023-09-08 PROCEDURE — 96402 CHEMO HORMON ANTINEOPL SQ/IM: CPT

## 2023-09-08 NOTE — PROGRESS NOTES
9/8/2023    Robert Colin  1935  [id number]        Assessment  Grade 3 prostate cancer, high-volume    Plan  We reviewed the recommendations. Based on age these can be variable, however he is well overall and would like to proceed with aggressive therapy. We discussed the indication, risk, benefits of androgen deprivation therapy and mechanism of action. He understands and is comfortable with this. Lupron 45 mg IM injection given in office today. We discussed that patients with metastatic disease can be on lifelong therapy. However as he only has 1 small area of metastasis, we may choose to proceed with intermittent therapy and evaluate his response to this in conjunction with external beam radiation which will include the pelvic lymph nodes. He is comfortable this plan. We discussed expectorations for future follow-up as well. Plan proceed with SpaceOAR, markers, radiation therapy. Follow-up here in about 6 months with PSA. We will then determine how we will proceed with further androgen deprivation therapy. History of Present Illness  Leonidas Vázquez is a 80 y.o. male evaluated for weight loss. CT scan did not reveal any abnormality other than pulmonary infection. PSA was noted to be elevated at 31, repeat 25. He was found to have nodular prostate and underwent prostate biopsy in the office on 7/31/2023. This revealed widespread grade 3 prostate cancer. He has been to radiation oncology consultation and returns today to discuss this as well as PET scan. PET scan reveals cancer within the prostate as well as a 6 mm left pelvic lymph node. Otherwise no widespread metastasis. He is decided to proceed with combined androgen deprivation and radiation therapy. He has discussed SpaceOAR and marker placement as well. Review of Systems  Review of Systems   Constitutional: Negative. HENT: Negative. Respiratory: Negative. Cardiovascular: Negative. Gastrointestinal: Negative. Genitourinary:        As per HPI   Musculoskeletal: Negative. Skin: Negative. Neurological: Negative. Hematological: Negative.           Past Medical History  Past Medical History:   Diagnosis Date   • Abnormal loss of weight     last assessed: 4/24/13   • Diverticulitis    • Diverticulosis    • Prostate cancer [facility])    • Skin cancer    • Type 2 diabetes mellitus ([facility])        Past Social History  Past Surgical History:   Procedure Laterality Date   • APPENDECTOMY      Age 12   • CATARACT EXTRACTION      right 5/2002, left 12/2003   • COLON SURGERY      for diverticulitis   • COLONOSCOPY      done 5/5/2009, mild diverticulosis noted, recheck in 3 years   • FL INJECTION LEFT HIP (NON ARTHROGRAM)  11/01/2022   • FL INJECTION RIGHT HIP (NON ARTHROGRAM)  11/01/2022   • INNER EAR SURGERY Left     Cochlear device implantation; resolved: Sep 2012   • MOHS SURGERY Left     left wrist   • SHOULDER SURGERY  05/2007    right rotator cuff       Past Family History  Family History   Problem Relation Age of Onset   • Coronary artery disease Sister    • Diabetes Sister    • Breast cancer Sister 79   • Diabetes Daughter        Past Social history  Social History     Socioeconomic History   • Marital status: /Civil Union     Spouse name: Not on file   • Number of children: Not on file   • Years of education: Not on file   • Highest education level: Not on file   Occupational History   • Not on file   Tobacco Use   • Smoking status: Never   • Smokeless tobacco: Never   Vaping Use   • Vaping Use: Never used   Substance and Sexual Activity   • Alcohol use: Yes     Comment: social drinker as per [employer]   • Drug use: No   • Sexual activity: Yes   Other Topics Concern   • Not on file   Social History Narrative   • Not on file     Social Determinants of Health     Financial Resource Strain: Low Risk  (3/28/2023)    Overall Financial Resource Strain (CARDIA)    • Difficulty of Paying Living Expenses: Not hard at all Food Insecurity: Not on file   Transportation Needs: No Transportation Needs (3/28/2023)    PRAPARE - Transportation    • Lack of Transportation (Medical): No    • Lack of Transportation (Non-Medical): No   Physical Activity: Not on file   Stress: Not on file   Social Connections: Not on file   Intimate Partner Violence: Not on file   Housing Stability: Not on file     Social History     Tobacco Use   Smoking Status Never   Smokeless Tobacco Never       Current Medications  Current Outpatient Medications   Medication Sig Dispense Refill   • levothyroxine 75 mcg tablet TAKE 1 TABLET(75 MCG) BY MOUTH DAILY 90 tablet 3   • loratadine-pseudoephedrine (CLARITIN-D 24-HOUR)  mg per 24 hr tablet Take 1 tablet by mouth daily     • tadalafil (CIALIS) 5 MG tablet Take 1 tablet (5 mg total) by mouth in the morning 30 tablet 6   • fluticasone (FLONASE) 50 mcg/act nasal spray 1 spray into each nostril daily (Patient not taking: Reported on 5/3/2023) 16 g 1     Current Facility-Administered Medications   Medication Dose Route Frequency Provider Last Rate Last Admin   • leuprolide (LUPRON DEPOT 6 MONTH KIT) IM injection kit 45 mg  45 mg Intramuscular Once Anabella Joseph MD           Allergies  No Known Allergies    Past Medical History, Social History, Family History, medications and allergies were reviewed.     Vitals  Vitals:    09/08/23 0854   BP: 120/78   BP Location: Left arm   Patient Position: Sitting   Cuff Size: Adult   Pulse: 74   SpO2: 100%   Weight: 64 kg (141 lb)       Physical Exam  Physical Exam      Results  Lab Results   Component Value Date    PSA 25.48 (H) 06/14/2023    PSA 31.01 (H) 05/31/2023     Lab Results   Component Value Date    GLUCOSE 136 08/14/2015    CALCIUM 8.9 07/21/2023     08/14/2015    K 4.1 07/21/2023    CO2 28 07/21/2023     07/21/2023    BUN 15 07/21/2023    CREATININE 1.01 07/21/2023     Lab Results   Component Value Date    WBC 6.86 07/21/2023    HGB 13.3 07/21/2023 HCT 41.4 07/21/2023    MCV 90 07/21/2023     07/21/2023

## 2023-09-08 NOTE — PROGRESS NOTES
Patient received ADT today, 9/8/23. Message sent to urology surgery scheduler to schedule SpaceOAR/Markers. I will follow up on date in the future.

## 2023-09-11 ENCOUNTER — TELEPHONE (OUTPATIENT)
Age: 88
End: 2023-09-11

## 2023-09-11 NOTE — TELEPHONE ENCOUNTER
Anna Harden is a 59year old female. Patient presents with:  Dysuria: FREQ--- X 3 DAYS---    HPI:   C/o freq, burning, urgency x 3 days, no blood, no fever,     Current Outpatient Prescriptions:  Albuterol Sulfate  (90 Base) MCG/ACT Inhalation Aero Received call back from Bassam's wife, Natalie Stuart, stating she would like to schedule an RD appointment for her . Discussed oncology nutrition services available to them. Initial RD consultation scheduled for 9/13/23 at 9 am. Provided RD contact information and encouraged them to reach out as needed. adenopathy,no bruits, no thyromegaly  LUNGS: clear to auscultation, no w/r/r  CARDIO: RRR without murmur, peripheral pulses intact  GI: good BS's,no masses, HSM or tenderness  No CVA tenderness to percussion    Recent Results (from the past 24 hour(s))  -U

## 2023-09-11 NOTE — TELEPHONE ENCOUNTER
Patient of Dr. Ninoska Palomo at [facility]    Patient's wife called stating patient is to scheduled Space Oar. She is having knee  surgery on Monday and she needs to know how soon this will be scheduled for him. She will need arrangements for her . Please review and advise. Patient has a hearing problem so she needs to be called.     Patient wife Katia Fernandez can be reached at [phone]

## 2023-09-11 NOTE — TELEPHONE ENCOUNTER
Pt wife called and requesting call back to schedule procedure     Pt call [phone] Heri Fletcher (wife)   PT wife goes to [facility] in the morning if you could call after 9am

## 2023-09-12 ENCOUNTER — DOCUMENTATION (OUTPATIENT)
Dept: HEMATOLOGY ONCOLOGY | Facility: CLINIC | Age: 88
End: 2023-09-12

## 2023-09-12 ENCOUNTER — TELEPHONE (OUTPATIENT)
Dept: FAMILY MEDICINE CLINIC | Facility: CLINIC | Age: 88
End: 2023-09-12

## 2023-09-12 ENCOUNTER — PATIENT OUTREACH (OUTPATIENT)
Dept: CASE MANAGEMENT | Facility: HOSPITAL | Age: 88
End: 2023-09-12

## 2023-09-12 NOTE — PROGRESS NOTES
Patient is scheduled for SpaceOAR/Markers on 10/24/23. Message sent to RAD ONC office to schedule SIM. I will follow up on SIM date in the near future.

## 2023-09-12 NOTE — TELEPHONE ENCOUNTER
LMOM: Yes, this is Sunita Rosina calling and I'm calling with a question for my , Titus Foster. The question is this. He has a an appointment for his regular six month visit with Doctor Ervin Zuniga on October 3rd and he was given a prescription to get all of his blood work done before that. However, recently he has been diagnosed with prostate cancer, so they did do some blood work but not the whole ball of wax. But I know he's Gerre Ball have to have blood work and EKG and a urinalysis done on October 10th, because on October 24th he has to have this hydrogel put in preparation for his radiation treatments. So we just didn't know if we should be getting the blood work done before he comes to [facility] . I don't know what blood work they get done for this procedure if it's as expensive as what you do for his regular six month check up. So we just didn't know if he's supposed to go for his blood work before he sees Doctor Ciaran Burroughs or what the story is. So my number is [phone]. OK, I'll wait to hear back from you guys. Thank you. Hakeem Hutchinson.    -Please note if orders can be placed or if visit on the 3rd should be extended/ rescheduled in order to prep for radiation treatments.

## 2023-09-12 NOTE — TELEPHONE ENCOUNTER
I spoke to the patient's wife and scheduled his procedure for 10/24/2023 at [facility] with Dr. Adam White.    -instructions given verbally and mailed  -patient aware to be NPO, needs a  and use an enema 1 hour prior to leaving the house morning of procedure  -CBC, CMP, Urine C&S and EKG 2 weeks prior

## 2023-09-12 NOTE — TELEPHONE ENCOUNTER
I reviewed labs recently. Pt already did a lot. Pt can just come without doing the blood work I ordered.

## 2023-09-13 ENCOUNTER — NUTRITION (OUTPATIENT)
Dept: NUTRITION | Facility: CLINIC | Age: 88
End: 2023-09-13

## 2023-09-13 DIAGNOSIS — Z71.3 NUTRITIONAL COUNSELING: Primary | ICD-10-CM

## 2023-09-13 NOTE — PATIENT INSTRUCTIONS
Nutrition Rx & Recommendations:  Diet: high calorie/high protein, CHO controlled diet  Keep a daily food journal (pen/paper, yulia such as [employer]). Nutrition Supplements: try Ensure complete shakes (higher in calories and protein). Aim for at least once/day, can increase to twice daily. May use homemade shakes/smoothies as desired. If using a pre-made shake/bar, choose ones with >300 calories and >10 grams protein (ex. Ensure Complete, Ensure Enlive, Ensure Plus, Boost Plus, Boost Very High Calorie, etc.). Small, frequent meals/snacks may be easier to tolerate than 3 large daily meals. Aim for 5-6 small meals per day (every 2-3 hours). Include protein at all meals/snacks. Include a variety of foods (as tolerated/allowed by diet). Incorporate physical activity as able/allowed. Stay hydrated by sipping fluids of choice/tolerance throughout the day. Liquid nutrition may be better tolerated than solids at times. Alter food choices and eating patterns to accommodate changing needs. Refer to Eating Hints book for other meal/snack ideas and symptom management. For appetite loss: try powdered or liquid nutrition supplements; eating by the clock every 2-3 hours; set a timer to remind yourself to eat, keep snacks nearby; add extra protein & calories to your diet; drink liquids in between meals, choose liquids that add calories; eat a bedtime snack; eat soft, cool or frozen foods; eat a larger meal when you feel well & rested; only sip small amounts of liquids during meals (see pages 10-12 in your Eating Hints book).   For weight loss: monitor your weight at home at least 2x/week, record your weight, start by adding 250-500 extra calories per day, eat 5-6 small scheduled meals every 2-3 hrs, choose foods that are high in protein and calories (see pages 49-53 in your Eating Hints book), drink liquids with calories for example: milkshakes/smoothies/juice/soup/whole milk/chocolate milk, cook with protein fortified milk (see recipe on page 36 in your Eating Hints book), consider ready-to-drink oral nutrition supplements such as Ensure Plus, Ensure Enlive, Boost Plus, or Boost Very High Calorie, avoid "diet" and "light" foods when possible, avoid drinking too much with meals, contact your dietitian with any continued weight loss over the course of 1 week. For more info see pages 35-37 in your Eating Hints book. Weigh yourself regularly. If you notice weight loss, make an effort to increase your daily food/calorie intake. If you continue to notice loss after these efforts, reach out to your dietitian to establish a plan to stabilize weight. Try Fairlife milk  Calorie/protein dense snack ideas:  Peanut butter and crackers  1/2 peanut butter sandwich  Chicken/tuna/egg salad  Hard boiled eggs  Trail mix or handful of nuts  Ensure   Chocolate milk  Cream based soup    Follow Up Plan: will reach out after 10/24 once they have a RT schedule.  They know to reach out sooner if needed  Recommend Referral to Other Providers: none at this time

## 2023-09-13 NOTE — PROGRESS NOTES
Outpatient Oncology Nutrition Consultation   Type of Consult: Initial Consult  Care Location: Office Visit, wife Marisabel Turner was present as well    Reason for referral: Received notification by [facility] RN, Maritza Gaytan on 9/6/23 that pt has triggered for oncology nutrition care (reason for referral: Malnutrition Screening Tool (MST) Triggers: scored a 2 indicating 2-13# (0.9-6 kg) recent wt loss and is eating poorly due to a decreased appetite. (Date of MST: 9/6/23) and patient request due to wt loss). Nutrition Assessment:   Oncology Diagnosis & Treatments: dx with prostate cancer 7/2023  -plan to start RT, [facility] planned for end of October    -hx of skin cancer w/ Mohs surgery . No chemo/RT  Oncology History   Prostate cancer ([address])   7/31/2023 Initial Diagnosis    Prostate cancer ([address])     7/31/2023 Biopsy    A. Prostate, right lateral base:  - Histologic Type: Acinar adenocarcinoma  - Parul score: 3 + 4 = 7  - Involvement: Involving 10% and 10% of  2 of 2 cores. - Grade group: II  - Percentage of pattern 4: approximately 10%  - Perineural invasion: Absent     -  Multiplex immunohistochemical stain performed with appropriate controls shows malignant glands with loss of basal layer cells staining for p63 and high molecular weight keratin with luminal racemase expression, supporting the diagnosis. B. Prostate, right medial base:  - Histologic Type: Acinar adenocarcinoma  - Parul score: 3 + 4 = 7  - Involvement: Involving 95% of 1 of 2 core. - Grade group: II  - Percentage of pattern 4: 5-10%  - Perineural invasion: Absent      -  Multiplex immunohistochemical stain performed with appropriate controls on block B2 shows benign glands with intact basal layer cells staining for p63 and high molecular weight keratin with negative luminal racemase expression, supporting the diagnosis.      C. Prostate, right lateral mid:  - Histologic Type: Acinar adenocarcinoma  - [place] score: 3 + 4 = 7  - Involvement: Involving 90% of 1 of 1 core. - Grade group: II  - Percentage of pattern 4: 15%  - Perineural invasion: Absent     D. Prostate, right medial mis:  - Histologic Type: Acinar adenocarcinoma  - [place] score: 3 + 4 = 7  - Involvement: Involving 60% of  1 of 1 core. - Grade group: II  - Percentage of pattern 4: 30%  - Perineural invasion: Absent     E. Prostate, right lateral apex:  - Histologic Type: Acinar adenocarcinoma  - [place] score: 3 + 4 = 7  - Involvement: Involving 95% of  1 of 1 core. - Grade group: II  - Percentage of pattern 4: 15%  - Perineural invasion: Absent     F. Prostate, right medial apex:  - Histologic Type: Acinar adenocarcinoma  - Parul score: 3 + 4 = 7  - Involvement: Involving 50% of 1 of 1 core. - Grade group: II  - Percentage of pattern 4: 25%  - Perineural invasion: Absent     G. Prostate, left lateral base:  - Benign prostatic tissue.  - Negative for malignancy. -  Multiplex immunohistochemical stain performed with appropriate controls shows benign glands with intact basal layer cells staining for p63 and high molecular weight keratin with negative luminal racemase expression, supporting the diagnosis. H. Prostate, left medial base:  - Benign prostatic tissue.  - Negative for malignancy. -  Multiplex immunohistochemical stain performed with appropriate controls shows benign glands with intact basal layer cells staining for p63 and high molecular weight keratin with negative luminal racemase expression, supporting the diagnosis. I. Prostate, left lateral mid:  - Histologic Type: Acinar adenocarcinoma  - [place] score: 3 + 3 = 6  - Involvement: Involving 20% of 1 of 1 core. - Grade group: I  - Perineural invasion: Absent     J. Prostate, left medial mid:  - Benign prostatic tissue.  - Negative for malignancy.       -  Multiplex immunohistochemical stain performed with appropriate controls shows benign glands with intact basal layer cells staining for p63 and high molecular weight keratin with negative luminal racemase expression, supporting the diagnosis. K. Prostate, left lateral apex:  - Histologic Type: Acinar adenocarcinoma  - [place] score: 4 + 3 = 7  - Involvement: Involving 95% discontinuously of  1 of 1 core. - Grade group: III  - Percentage of pattern 4: 90%  - Perineural invasion: Absent      L. Prostate, left medial apex :  - Histologic Type: Acinar adenocarcinoma  - [place] score: 4 + 3 = 7  - Involvement: Involving 90% discontinuously of  1 of 1 core. - Grade group: III  - Percentage of pattern 4: 80%  - Perineural invasion: Absent      -  Multiplex immunohistochemical stain performed with appropriate controls shows malignant glands with loss of basal layer cells staining for p63 and high molecular weight keratin with luminal racemase expression, supporting the diagnosis. 9/6/2023 -  Cancer Staged    Staging form: Prostate, AJCC 8th Edition  - Clinical stage from 9/6/2023: Stage RUBY (cT1c, cN1, cM0, PSA: 31, Grade Group: 3) - Signed by Lucio Sandhoff, MD on 9/6/2023  Histopathologic type:  Adenocarcinoma, NOS  Stage prefix: Initial diagnosis  Prostate specific antigen (PSA) range: 20 or greater  Bryson primary pattern: 4  Bryson secondary pattern: 3  Parul score: 7  Histologic grading system: 5 grade system  Location of positive needle core biopsies: Both sides         Past Medical & Surgical Hx:   Patient Active Problem List   Diagnosis   • Hypothyroidism   • 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, without long-term current use of insulin ([facility])   • Loss of hearing   • Primary osteoarthritis of left hip   • Primary osteoarthritis of both knees   • Male erectile dysfunction, unspecified   • Vertigo   • Balance disorder   • RBBB (right bundle branch block)   • PVD (peripheral vascular disease) ([facility])   • Pancreatic cyst   • Left hip pain   • Hammertoe   • Seborrheic keratoses   • Allergic rhinitis   • Diabetes mellitus with peripheral vascular disease ([address] [facility])   • Onychomycosis   • Nail hypertrophy   • Stage 3a chronic kidney disease (HCC)   • Callus   • Mild protein-calorie malnutrition (HCC)   • Chronic cough   • Postnasal drip   • Weight loss   • Benign prostatic hyperplasia with nocturia   • Hydronephrosis of left kidney   • Prostate cancer [facility])     Past Medical History:   Diagnosis Date   • Abnormal loss of weight     last assessed: 4/24/13   • Diverticulitis    • Diverticulosis    • Prostate cancer [facility])    • Skin cancer    • Type 2 diabetes mellitus ([address] [facility])      Past Surgical History:   Procedure Laterality Date   • APPENDECTOMY      Age 12   • CATARACT EXTRACTION      right 5/2002, left 12/2003   • COLON SURGERY      for diverticulitis   • COLONOSCOPY      done 5/5/2009, mild diverticulosis noted, recheck in 3 years   • FL INJECTION LEFT HIP (NON ARTHROGRAM)  11/01/2022   • FL INJECTION RIGHT HIP (NON ARTHROGRAM)  11/01/2022   • INNER EAR SURGERY Left     Cochlear device implantation; resolved: Sep 2012   • MOHS SURGERY Left     left wrist   • SHOULDER SURGERY  05/2007    right rotator cuff       Review of Medications:   Vitamins, Supplements and Herbals: Med List Reviewed & pt is only taking: MVI one a day (mens 50+)    Current Outpatient Medications:   •  fluticasone (FLONASE) 50 mcg/act nasal spray, 1 spray into each nostril daily (Patient not taking: Reported on 5/3/2023), Disp: 16 g, Rfl: 1  •  levothyroxine 75 mcg tablet, TAKE 1 TABLET(75 MCG) BY MOUTH DAILY, Disp: 90 tablet, Rfl: 3  •  loratadine-pseudoephedrine (CLARITIN-D 24-HOUR)  mg per 24 hr tablet, Take 1 tablet by mouth daily, Disp: , Rfl:   •  tadalafil (CIALIS) 5 MG tablet, Take 1 tablet (5 mg total) by mouth in the morning, Disp: 30 tablet, Rfl: 6    Most Recent Lab Results:   Lab Results   Component Value Date    WBC 6.86 07/21/2023    NEUTROABS 3.98 07/21/2023    IRON 119 08/14/2015    CHOLESTEROL 175 07/06/2020    TRIG 80 07/06/2020    HDL 34 (L) 07/06/2020 LDLCALC 125 (H) 07/06/2020    ALT 15 07/21/2023    AST 19 07/21/2023    ALB 3.6 07/21/2023     08/14/2015     05/01/2015    SODIUM 135 07/21/2023    SODIUM 139 03/23/2023    K 4.1 07/21/2023    K 4.1 03/23/2023     07/21/2023    BUN 15 07/21/2023    BUN 18 03/23/2023    CREATININE 1.01 07/21/2023    CREATININE 0.94 03/23/2023    EGFR 66 07/21/2023    GLUCOSE 136 08/14/2015    GLUF 129 (H) 03/23/2023    GLUF 154 (H) 09/07/2022    GLUC 134 07/21/2023    HGBA1C 6.8 (H) 07/21/2023    HGBA1C 6.8 (H) 03/23/2023    HGBA1C 7.0 (H) 09/07/2022    CALCIUM 8.9 07/21/2023       Anthropometric Measurements:   Height: 71"  Ht Readings from Last 1 Encounters:   09/06/23 5' 10" (1.778 m)     Wt Readings from Last 20 Encounters:   09/08/23 64 kg (141 lb)   09/06/23 65.4 kg (144 lb 2.9 oz)   08/11/23 63.5 kg (140 lb)   07/31/23 64.4 kg (142 lb)   07/25/23 65.3 kg (144 lb)   05/31/23 65.3 kg (144 lb)   05/03/23 63 kg (138 lb 12.8 oz)   04/25/23 65.8 kg (145 lb)   04/25/23 65.3 kg (144 lb)   03/28/23 65.3 kg (144 lb)   03/10/23 64 kg (141 lb)   01/24/23 68.5 kg (151 lb)   10/18/22 69.4 kg (153 lb)   09/26/22 69.1 kg (152 lb 6.4 oz)   06/22/22 67.1 kg (148 lb)   03/23/22 68.4 kg (150 lb 12.8 oz)   03/10/22 68.6 kg (151 lb 3.2 oz)   12/28/21 66.4 kg (146 lb 6.4 oz)   12/23/21 68.5 kg (151 lb)   09/23/21 65.8 kg (145 lb)       Weight History:   • Usual Weight: 154#  • Varian: n/a  • Home Scale: plan to get a new scale    Oncology Nutrition-Anthropometrics    Flowsheet Row Nutrition from 9/13/2023 in [facility] On[facility] Diet[facility] Services   Patient age (years): 80 years   Patient (male) height (in): 70 in   Current weight (lbs): 141 lbs   Current weight to be used for anthropometric calculations (kg) 64.1 kg   BMI: 19.7   IBW male 172 lb   IBW (kg) male 78.2 kg   IBW % (male) 82 %   Adjusted BW (male): 164.3 lbs   Adjusted BW in kg (male): 74.7 kg   % weight change after 1 month: 0.7 %   Weight change after 1 month (lbs) 1 lbs   % weight change after 6 months: 0 %   Weight change after 6 months (lbs) 0 lbs   % weight change after 1 year: -7.5 %   Weight change after 1 year (lbs) -11.4 lbs          Nutrition-Focused Physical Findings: none observed    Food/Nutrition-Related History & Client/Social History:    Current Nutrition Impact Symptoms:  [] Nausea  [x] Reduced Appetite  [] Acid Reflux    [] Vomiting  [x] Unintended Wt Loss  [] Malabsorption    [] Diarrhea  [] Unintended Wt Gain  [] Dumping Syndrome    [] Constipation  [] Thick Mucous/Secretions  [] Abdominal Pain    [] Dysgeusia (Altered Taste)  [] Xerostomia (Dry Mouth)  [] Gas    [] Dysosmia (Altered Smell)  [] Thrush  [] Difficulty Chewing    [] Oral Mucositis (Sore Mouth)  [] Fatigue  [x] Hyperglycemia   Lab Results   Component Value Date    HGBA1C 6.8 (H) 07/21/2023      [] Odynophagia  [] Esophagitis  [] Other:    [] Dysphagia  [x] Early Satiety  [] No Problems Eating      Food Allergies & Intolerances: no    Current Diet: Regular Diet, No Restrictions  Current Nutrition Intake: Less than usual   Appetite: Fair , Poor  Nutrition Route: PO  Oral Care: has dentures, washed daily  Activity level: plays golf, gardening. Very busy and active during the day    24 Hr Diet Recall:   Breakfast: cup of tea w/ milk and sugar, 4 small biscuit cookies OR piece of toast with butter  Snack: nothing  Lunch: sometimes skips, but yesterday had leftover pasta (rigatoni and sausage)  Snack: grapes  Dinner: went out to the diner - chop steak, corn, some salad bar and cup of soup  Snack: cup and tea and a donut.  Sometimes will have bowl of ice cream ( a few times a week)     Beverages: water (8oz x2), whole or chocolate milk (8oz x1), tea (8oz x2)  Supplements:   • Boost High Protein (8 oz, 240 kcal, 20 g pro) maybe once/day, sometimes skips      Oncology Nutrition-Estimated Needs    Flowsheet Row Nutrition from 9/13/2023 in [address] [facility] On[facility] Dietitian Services Weight type used Actual weight   Weight in kilograms (kg) used for estimated needs 64.1 kg   Energy needs formula:  30-35 kcal/kg   Energy needs based on 30 kcal/k kcal   Energy needs based on 35 kcal/k kcal   Protein needs formula: 1.2-1.5 g/kg   Protein needs based on 1.2 g/k g   Protein needs based on 1.5 g/kg 96 g   Fluid needs formula: 30-35 mL/kg   Fluid needs based on 30 mL/kg 1923 mL   Fluid needs in ounces 65 oz   Fluid needs based on 35 mL/kg 2244 mL   Fluid needs in ounces 76 oz           Discussion & Intervention:   Bradly See was evaluated today for an initial RD consultation regarding unintended weight loss and poor po intake. Bradly See is planned to undergo tx for prostate cancer. Met with Bradly See and his wife Rafy Arias today. Bradly See states he's always been thin, but he used to weigh around 154# and is now down to 141#. He reports a decrease in appetite. He feels like he can't eat as much and often skips meals. He is still very active. He was dx with pre-DM. We reviewed calorie and protein needs, hydration needs, importance of adequate nutrition and weight maintenance during cancer tx. He is likely starting tx at the end of October or November. Reviewed 24 hour recall, which revealed an inadequate po intake, and discussed ways to increase kcal, protein, and fluid intakes and optimize nutrient intake. Also reviewed the importance of wt management throughout the tx process and the role of a high kcal/ high protein diet and carbohydrate controlled diet in managing wt and overall health.   Based on today's assessment, discussion included: MNT for: wt loss, early satiety, CHO controlled diet, protein, hydration, decreased appetite, how to modify foods for anticipated nutrition impact symptoms pt may experience during CA tx, a high kcal/protein diet & food choices to include at all meals & snacks (Examples of high kcal foods: cheese, full-fat dairy products, nut butter, plant-based fats, coconut oil/milk, avocado, butter, cream soup, etc. Examples of high protein foods: eggs, chicken, fish, beans/legumes, nuts/nut butters, bone broth, etc.) , spreading carbohydrate intake throughout the day & counting carbohydrates for adequate glycemic control, fortifying foods for added kcal and protein (examples include: adding cheese to foods such as eggs, mashed potatoes, casseroles, etc.; Making oatmeal with whole milk rather than water; Making fortified mashed potatoes with cream, butter, dry milk powder, plain [place] yogurt, and cheese.), adequate hydration & fluid choices, sipping on calorie containing beverages (examples include: adding whole milk or cream to coffee, oral nutrition supplements, juice, electrolyte replacement beverages, milk, etc.), eating smaller more frequent meals every 2-3 hours (5-6 small meals/day), choosing higher kcal/protein oral nutrition supplements and increasing oral nutrition supplements, adding extra fat to foods while cooking such as butter, plant-based oil, coconut oil/milk, avocado, nut butters, etc. and balancing energy intake with physical activity. Moving forward, Dilip Davidson was encouraged to increase kcal, protein, and fluid intakes . Materials Provided: [facility] Eating Hints book, Ensure samples, Ensure Coupons, Oncology Nutrition Services Brochure and Oncology Nutrition Class Flyer  All questions and concerns addressed during today’s visit. Dilip Davidson has RD contact information. Nutrition Diagnosis:   • Inadequate Energy Intake related to physiological causes, disease state and treatment related issues as evidenced by food recall, wt loss and discussion with pt and/or family. • Increased Nutrient Needs (kcal & pro) related to increased demand for nutrients and disease state as evidenced by cancer dx and pt to undergo tx for cancer.   Monitoring & Evaluation:   Goals:  · weight maintenance/stabilization  · adequate nutrition impact symptom management  · pt to meet >/=75% estimated nutrition needs daily    · Progress Towards Goals: Initiated    Nutrition Rx & Recommendations:  · Diet: high calorie/high protein, CHO controlled diet  · Keep a daily food journal (pen/paper, yulia such as [employer]dom). · Nutrition Supplements: try Ensure complete shakes (higher in calories and protein). Aim for at least once/day, can increase to twice daily. May use homemade shakes/smoothies as desired. If using a pre-made shake/bar, choose ones with >300 calories and >10 grams protein (ex. Ensure Complete, Ensure Enlive, Ensure Plus, Boost Plus, Boost Very High Calorie, etc.). · Small, frequent meals/snacks may be easier to tolerate than 3 large daily meals. Aim for 5-6 small meals per day (every 2-3 hours). · Include protein at all meals/snacks. · Include a variety of foods (as tolerated/allowed by diet). · Incorporate physical activity as able/allowed. · Stay hydrated by sipping fluids of choice/tolerance throughout the day. · Liquid nutrition may be better tolerated than solids at times. · Alter food choices and eating patterns to accommodate changing needs. · Refer to Eating Hints book for other meal/snack ideas and symptom management. · For appetite loss: try powdered or liquid nutrition supplements; eating by the clock every 2-3 hours; set a timer to remind yourself to eat, keep snacks nearby; add extra protein & calories to your diet; drink liquids in between meals, choose liquids that add calories; eat a bedtime snack; eat soft, cool or frozen foods; eat a larger meal when you feel well & rested; only sip small amounts of liquids during meals (see pages 10-12 in your Eating Hints book).   · For weight loss: monitor your weight at home at least 2x/week, record your weight, start by adding 250-500 extra calories per day, eat 5-6 small scheduled meals every 2-3 hrs, choose foods that are high in protein and calories (see pages 49-53 in your Eating Hints book), drink liquids with calories for example: milkshakes/smoothies/juice/soup/whole milk/chocolate milk, cook with protein fortified milk (see recipe on page 36 in your Eating Hints book), consider ready-to-drink oral nutrition supplements such as Ensure Plus, Ensure Enlive, Boost Plus, or Boost Very High Calorie, avoid "diet" and "light" foods when possible, avoid drinking too much with meals, contact your dietitian with any continued weight loss over the course of 1 week. For more info see pages 35-37 in your Eating Hints book. · Weigh yourself regularly. If you notice weight loss, make an effort to increase your daily food/calorie intake. If you continue to notice loss after these efforts, reach out to your dietitian to establish a plan to stabilize weight. · Try Fairlife milk  · Calorie/protein dense snack ideas:  · Peanut butter and crackers  · 1/2 peanut butter sandwich  · Chicken/tuna/egg salad  · Hard boiled eggs  · Trail mix or handful of nuts  · Ensure   · Chocolate milk  · Cream based soup    Follow Up Plan: will reach out after 10/24 once they have a RT schedule.  They know to reach out sooner if needed  Recommend Referral to Other Providers: none at this time

## 2023-09-18 ENCOUNTER — DOCUMENTATION (OUTPATIENT)
Dept: HEMATOLOGY ONCOLOGY | Facility: CLINIC | Age: 88
End: 2023-09-18

## 2023-09-19 ENCOUNTER — PATIENT OUTREACH (OUTPATIENT)
Dept: CASE MANAGEMENT | Facility: HOSPITAL | Age: 88
End: 2023-09-19

## 2023-09-26 ENCOUNTER — RA CDI HCC (OUTPATIENT)
Dept: OTHER | Facility: HOSPITAL | Age: 88
End: 2023-09-26

## 2023-09-26 ENCOUNTER — PATIENT OUTREACH (OUTPATIENT)
Dept: CASE MANAGEMENT | Facility: HOSPITAL | Age: 88
End: 2023-09-26

## 2023-09-26 NOTE — PROGRESS NOTES
[address] [facility] coding opportunities          Chart Reviewed number of suggestions sent to Provider: 4     Patients Insurance     Medicare Insurance: Medicare        E11.51  E11.22  [id number]  E11.36

## 2023-10-10 ENCOUNTER — APPOINTMENT (OUTPATIENT)
Dept: LAB | Facility: HOSPITAL | Age: 88
End: 2023-10-10
Payer: MEDICARE

## 2023-10-10 DIAGNOSIS — C61 MALIGNANT NEOPLASM OF PROSTATE (HCC): ICD-10-CM

## 2023-10-10 DIAGNOSIS — Z01.810 PRE-OPERATIVE CARDIOVASCULAR EXAMINATION: ICD-10-CM

## 2023-10-10 DIAGNOSIS — R39.89 SUSPECTED UTI: ICD-10-CM

## 2023-10-10 DIAGNOSIS — Z01.812 PRE-OPERATIVE LABORATORY EXAMINATION: ICD-10-CM

## 2023-10-10 LAB
ALBUMIN SERPL BCP-MCNC: 3.7 G/DL (ref 3.5–5)
ALP SERPL-CCNC: 45 U/L (ref 34–104)
ALT SERPL W P-5'-P-CCNC: 18 U/L (ref 7–52)
ANION GAP SERPL CALCULATED.3IONS-SCNC: 6 MMOL/L
AST SERPL W P-5'-P-CCNC: 19 U/L (ref 13–39)
ATRIAL RATE: 77 BPM
BASOPHILS # BLD AUTO: 0.03 THOUSANDS/ÂΜL (ref 0–0.1)
BASOPHILS NFR BLD AUTO: 0 % (ref 0–1)
BILIRUB SERPL-MCNC: 0.8 MG/DL (ref 0.2–1)
BUN SERPL-MCNC: 27 MG/DL (ref 5–25)
CALCIUM SERPL-MCNC: 9.7 MG/DL (ref 8.4–10.2)
CHLORIDE SERPL-SCNC: 102 MMOL/L (ref 96–108)
CO2 SERPL-SCNC: 29 MMOL/L (ref 21–32)
CREAT SERPL-MCNC: 0.96 MG/DL (ref 0.6–1.3)
EOSINOPHIL # BLD AUTO: 0.2 THOUSAND/ÂΜL (ref 0–0.61)
EOSINOPHIL NFR BLD AUTO: 2 % (ref 0–6)
ERYTHROCYTE [DISTWIDTH] IN BLOOD BY AUTOMATED COUNT: 12.9 % (ref 11.6–15.1)
GFR SERPL CREATININE-BSD FRML MDRD: 70 ML/MIN/1.73SQ M
GLUCOSE P FAST SERPL-MCNC: 153 MG/DL (ref 65–99)
HCT VFR BLD AUTO: 39.5 % (ref 36.5–49.3)
HGB BLD-MCNC: 13.1 G/DL (ref 12–17)
IMM GRANULOCYTES # BLD AUTO: 0.04 THOUSAND/UL (ref 0–0.2)
IMM GRANULOCYTES NFR BLD AUTO: 1 % (ref 0–2)
LEFT EYE DIABETIC RETINOPATHY: NORMAL
LYMPHOCYTES # BLD AUTO: 2.46 THOUSANDS/ÂΜL (ref 0.6–4.47)
LYMPHOCYTES NFR BLD AUTO: 30 % (ref 14–44)
MCH RBC QN AUTO: 29.8 PG (ref 26.8–34.3)
MCHC RBC AUTO-ENTMCNC: 33.2 G/DL (ref 31.4–37.4)
MCV RBC AUTO: 90 FL (ref 82–98)
MONOCYTES # BLD AUTO: 0.63 THOUSAND/ÂΜL (ref 0.17–1.22)
MONOCYTES NFR BLD AUTO: 8 % (ref 4–12)
NEUTROPHILS # BLD AUTO: 4.83 THOUSANDS/ÂΜL (ref 1.85–7.62)
NEUTS SEG NFR BLD AUTO: 59 % (ref 43–75)
NRBC BLD AUTO-RTO: 0 /100 WBCS
P AXIS: 76 DEGREES
PLATELET # BLD AUTO: 239 THOUSANDS/UL (ref 149–390)
PMV BLD AUTO: 9.4 FL (ref 8.9–12.7)
POTASSIUM SERPL-SCNC: 4.3 MMOL/L (ref 3.5–5.3)
PR INTERVAL: 158 MS
PROT SERPL-MCNC: 6.6 G/DL (ref 6.4–8.4)
QRS AXIS: 94 DEGREES
QRSD INTERVAL: 142 MS
QT INTERVAL: 438 MS
QTC INTERVAL: 495 MS
RBC # BLD AUTO: 4.4 MILLION/UL (ref 3.88–5.62)
RIGHT EYE DIABETIC RETINOPATHY: NORMAL
SODIUM SERPL-SCNC: 137 MMOL/L (ref 135–147)
T WAVE AXIS: 29 DEGREES
VENTRICULAR RATE: 77 BPM
WBC # BLD AUTO: 8.19 THOUSAND/UL (ref 4.31–10.16)

## 2023-10-10 PROCEDURE — 87086 URINE CULTURE/COLONY COUNT: CPT

## 2023-10-10 PROCEDURE — 80053 COMPREHEN METABOLIC PANEL: CPT

## 2023-10-10 PROCEDURE — 36415 COLL VENOUS BLD VENIPUNCTURE: CPT

## 2023-10-10 PROCEDURE — 93010 ELECTROCARDIOGRAM REPORT: CPT | Performed by: INTERNAL MEDICINE

## 2023-10-10 PROCEDURE — 85025 COMPLETE CBC W/AUTO DIFF WBC: CPT

## 2023-10-11 LAB — BACTERIA UR CULT: NORMAL

## 2023-10-17 ENCOUNTER — OFFICE VISIT (OUTPATIENT)
Dept: FAMILY MEDICINE CLINIC | Facility: CLINIC | Age: 88
End: 2023-10-17
Payer: MEDICARE

## 2023-10-17 ENCOUNTER — TELEPHONE (OUTPATIENT)
Dept: ADMINISTRATIVE | Facility: OTHER | Age: 88
End: 2023-10-17

## 2023-10-17 VITALS
RESPIRATION RATE: 16 BRPM | BODY MASS INDEX: 20.01 KG/M2 | DIASTOLIC BLOOD PRESSURE: 74 MMHG | HEIGHT: 70 IN | HEART RATE: 50 BPM | WEIGHT: 139.8 LBS | OXYGEN SATURATION: 99 % | TEMPERATURE: 97.4 F | SYSTOLIC BLOOD PRESSURE: 118 MMHG

## 2023-10-17 DIAGNOSIS — R93.89 ABNORMAL CT OF THE CHEST: ICD-10-CM

## 2023-10-17 DIAGNOSIS — R42 VERTIGO: ICD-10-CM

## 2023-10-17 DIAGNOSIS — E11.40 CONTROLLED TYPE 2 DIABETES MELLITUS WITH DIABETIC NEUROPATHY, WITHOUT LONG-TERM CURRENT USE OF INSULIN (HCC): ICD-10-CM

## 2023-10-17 DIAGNOSIS — C61 PROSTATE CANCER (HCC): Primary | ICD-10-CM

## 2023-10-17 DIAGNOSIS — Z23 ENCOUNTER FOR IMMUNIZATION: ICD-10-CM

## 2023-10-17 DIAGNOSIS — E03.9 ACQUIRED HYPOTHYROIDISM: ICD-10-CM

## 2023-10-17 PROBLEM — F33.9 DEPRESSION, RECURRENT (HCC): Status: ACTIVE | Noted: 2023-10-17

## 2023-10-17 LAB — SL AMB POCT HEMOGLOBIN AIC: 7.3 (ref ?–6.5)

## 2023-10-17 PROCEDURE — 99214 OFFICE O/P EST MOD 30 MIN: CPT | Performed by: FAMILY MEDICINE

## 2023-10-17 PROCEDURE — 90662 IIV NO PRSV INCREASED AG IM: CPT

## 2023-10-17 PROCEDURE — 83036 HEMOGLOBIN GLYCOSYLATED A1C: CPT | Performed by: FAMILY MEDICINE

## 2023-10-17 PROCEDURE — G0008 ADMIN INFLUENZA VIRUS VAC: HCPCS

## 2023-10-17 RX ORDER — MECLIZINE HYDROCHLORIDE 25 MG/1
25 TABLET ORAL EVERY 8 HOURS PRN
Qty: 30 TABLET | Refills: 0 | Status: SHIPPED | OUTPATIENT
Start: 2023-10-17

## 2023-10-17 NOTE — LETTER
Diabetic Eye Exam Form    Date Requested: 10/17/23  Patient: Lenard Pearce  Patient : 1935   Referring Provider: Kenny Huang MD      DIABETIC Eye Exam Date _______________________________      Type of Exam MUST be documented for Diabetic Eye Exams. Please CHECK ONE. Retinal Exam       Dilated Retinal Exam       OCT       Optomap-Iris Exam      Fundus Photography       Left Eye - Please check Retinopathy or No Retinopathy        Exam did show retinopathy    Exam did not show retinopathy       Right Eye - Please check Retinopathy or No Retinopathy       Exam did show retinopathy    Exam did not show retinopathy       Comments __________________________________________________________    Practice Providing Exam ______________________________________________    Exam Performed By (print name) _______________________________________      Provider Signature ___________________________________________________      These reports are needed for  compliance. Please fax this completed form and a copy of the Diabetic Eye Exam report to our office located at [address] [place] [address] as soon as possible via Fax [phone] attention Chester Kid: Phone [phone]  We thank you for your assistance in treating our mutual patient.

## 2023-10-17 NOTE — TELEPHONE ENCOUNTER
----- Message from [facility] sent at 10/17/2023 11:05 AM EDT -----  Regarding: Care Gap Request  10/17/23 11:05 AM    Hello, our patient Arsh Ellsworth has had Diabetic Eye Exam completed/performed. Please assist in updating the patient chart by making an External outreach to Dr Rupinder Vasquez at [facility] facility located in  [place] [address] in [facility]. The date of service is 10/10/2023.     Thank you,  Sasha Ayoub  [facility]

## 2023-10-17 NOTE — TELEPHONE ENCOUNTER
Upon review of the In Basket request and the patient's chart, initial outreach has been made via fax to facility. Please see Contacts section for details.      Thank you  Yana De La O

## 2023-10-17 NOTE — PROGRESS NOTES
Name: Mingo Celis      : 1935      MRN: [id number]  Encounter Provider: Treva Leal MD  Encounter Date: 10/17/2023   Encounter department: [address] [place] [address],[address]     1. Prostate cancer [facility])  Comments:  FU urology and hem/onc. 2. Abnormal CT of the chest  Assessment & Plan:  2023 PET done. Recheck CT chest in 3 months. Order done. Orders:  -     CT chest w contrast; Future; Expected date: 2023    3. Vertigo  Assessment & Plan:  Give meclizine 25mg prn. SE educated pt. Orders:  -     meclizine (ANTIVERT) 25 mg tablet; Take 1 tablet (25 mg total) by mouth every 8 (eight) hours as needed for dizziness    4. Encounter for immunization  -     influenza vaccine, high-dose, PF 0.7 mL (FLUZONE HIGH-DOSE)    5. 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 Orders:  -     POCT hemoglobin A1c  -     CBC; Future; Expected date: 2024  -     Comprehensive metabolic panel; Future; Expected date: 2024  -     Hemoglobin A1C; Future; Expected date: 2024  -     Albumin / creatinine urine ratio; Future; Expected date: 2024  -     TSH, 3rd generation with Free T4 reflex; Future; Expected date: 2024    6. Acquired hypothyroidism  Assessment & Plan:  2023 TSH normal. Continue levothyroxine 75mcg daily. Orders:  -     CBC; Future; Expected date: 2024  -     Comprehensive metabolic panel; Future; Expected date: 2024  -     Hemoglobin A1C; Future; Expected date: 2024  -     Albumin / creatinine urine ratio; Future; Expected date: 2024  -     TSH, 3rd generation with Free T4 reflex; Future; Expected date: 2024           Give flu shot today. Subjective      HPI    Pt is here with his wife. Prostate cancer---FU urology and hem/onc.    2023 PET showed "1. Heterogeneous multifocal tracer activity involving the prostate gland consistent with intraprostatic PSMA positive malignancy. 2. 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 3. Nonspecific multifocal tracer avid patchy pulmonary opacities involving both lungs most consistent with an inflammatory process. Short interval follow-up with CT of chest in 3 months is recommended to ensure stability and exclude less likely   underlying PSMA positive malignancy."    Chronic cough for years. Pt has postnasal drip. Does not use claritin and flonase because they affect his appetite. Does not want to see pulmonary now. Will recheck CT chest in 3 months. DM---Today HgA1C 7.3 controlled. Diet control. Denies hypoglycemia. 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 FU ophthalmologist Dr. Sheree Larios in [facility]. Last visit was last week. FU podiatry Dr. Chintan Cunha in [facility] Q 6 weeks. Hypothyroidism---He is on levothyroxine 75mcg daily. 7/2023 TSH normal.      Live with wife. Does all ADL's. Decreased appetite recently. Denies recent falls. Denies depression. Review of Systems   Constitutional:  Positive for unexpected weight change. Negative for appetite change, chills and fever. HENT:  Negative for congestion, ear pain, sinus pain and sore throat. Eyes:  Negative for discharge and itching. Respiratory:  Negative for apnea, cough, chest tightness, shortness of breath and wheezing. Cardiovascular:  Negative for chest pain, palpitations and leg swelling. Gastrointestinal:  Negative for abdominal pain, anal bleeding, constipation, diarrhea, nausea and vomiting. Endocrine: Negative for cold intolerance, heat intolerance and polyuria. Genitourinary:  Negative for difficulty urinating and dysuria. Musculoskeletal:  Negative for arthralgias, back pain and myalgias.    Skin: Negative for rash. Neurological:  Negative for dizziness and headaches. Psychiatric/Behavioral:  Negative for agitation. Current Outpatient Medications on File Prior to Visit   Medication Sig    levothyroxine 75 mcg tablet TAKE 1 TABLET(75 MCG) BY MOUTH DAILY    tadalafil (CIALIS) 5 MG tablet Take 1 tablet (5 mg total) by mouth in the morning    [DISCONTINUED] fluticasone (FLONASE) 50 mcg/act nasal spray 1 spray into each nostril daily (Patient not taking: Reported on 5/3/2023)    [DISCONTINUED] loratadine-pseudoephedrine (CLARITIN-D 24-HOUR)  mg per 24 hr tablet Take 1 tablet by mouth daily (Patient not taking: Reported on 10/17/2023)       Objective     /74   Pulse (!) 50   Temp (!) 97.4 °F (36.3 °C) (Temporal)   Resp 16   Ht 5' 10" (1.778 m)   Wt 63.4 kg (139 lb 12.8 oz)   SpO2 99%   BMI 20.06 kg/m²     Physical Exam  Constitutional:       Appearance: He is well-developed. HENT:      Head: Normocephalic and atraumatic. Eyes:      General:         Right eye: No discharge. Left eye: No discharge. Conjunctiva/sclera: Conjunctivae normal.   Cardiovascular:      Rate and Rhythm: Normal rate and regular rhythm. Pulses: no weak pulses          Dorsalis pedis pulses are 2+ on the right side and 2+ on the left side. Heart sounds: Normal heart sounds. No murmur heard. No friction rub. No gallop. Pulmonary:      Effort: Pulmonary effort is normal. No respiratory distress. Breath sounds: Normal breath sounds. No wheezing or rales. Abdominal:      General: Bowel sounds are normal. There is no distension. Palpations: Abdomen is soft. Tenderness: There is no abdominal tenderness. There is no guarding. Musculoskeletal:      Cervical back: Normal range of motion and neck supple. No tenderness. Right lower leg: No edema. Left lower leg: No edema.         Feet:    Feet:      Right foot:      Skin integrity: No ulcer, skin breakdown, erythema, warmth, callus or dry skin. Left foot:      Skin integrity: No ulcer, skin breakdown, erythema, warmth, callus or dry skin. Lymphadenopathy:      Cervical: No cervical adenopathy. Neurological:      Mental Status: He is alert. Patient's shoes and socks removed. Right Foot/Ankle   Right Foot Inspection  Skin Exam: skin normal and skin intact. No dry skin, no warmth, no callus, no erythema, no maceration, no abnormal color, no pre-ulcer, no ulcer and no callus. Sensory   Monofilament testing: absent    Vascular  The right DP pulse is 2+. Left Foot/Ankle  Left Foot Inspection  Skin Exam: skin normal and skin intact. No dry skin, no warmth, no erythema, no maceration, normal color, no pre-ulcer, no ulcer and no callus. Sensory   Monofilament testing: absent    Vascular  The left DP pulse is 2+.      Assign Risk Category  No deformity present  Loss of protective sensation  No weak pulses  Risk: 1          Marlene Joy MD

## 2023-10-19 NOTE — TELEPHONE ENCOUNTER
Upon review of the In Basket request we were able to locate, review, and update the patient chart as requested for Diabetic Eye Exam.    Any additional questions or concerns should be emailed to the Practice Liaisons via the appropriate education email address, please do not reply via In Basket.     Thank you  Zahraa Zheng

## 2023-10-20 ENCOUNTER — PATIENT OUTREACH (OUTPATIENT)
Dept: HEMATOLOGY ONCOLOGY | Facility: CLINIC | Age: 88
End: 2023-10-20

## 2023-10-20 NOTE — PROGRESS NOTES
Outreach made to patient. I called and LM introducing myself and explaining my role. I asked patient to return my call to go over assessment questions together. I will wait for a returned phone call from patient.

## 2023-10-23 ENCOUNTER — PATIENT OUTREACH (OUTPATIENT)
Dept: HEMATOLOGY ONCOLOGY | Facility: CLINIC | Age: 88
End: 2023-10-23

## 2023-10-23 ENCOUNTER — ANESTHESIA EVENT (OUTPATIENT)
Dept: GASTROENTEROLOGY | Facility: HOSPITAL | Age: 88
End: 2023-10-23
Payer: MEDICARE

## 2023-10-23 DIAGNOSIS — C61 PROSTATE CANCER (HCC): Primary | ICD-10-CM

## 2023-10-23 NOTE — PROGRESS NOTES
Patients wife called and LM starting she was returning my call. I was unable to  phone at that time. Outreach made to patients wife and introduced myself and explained my role further. We went over general assessment questions together. We also dicussed his treatment timeline. They do not have any other questions or concerns at this time but know I remain available if any arise.

## 2023-10-24 ENCOUNTER — ANESTHESIA (OUTPATIENT)
Dept: GASTROENTEROLOGY | Facility: HOSPITAL | Age: 88
End: 2023-10-24
Payer: MEDICARE

## 2023-10-24 ENCOUNTER — TELEPHONE (OUTPATIENT)
Dept: GENETICS | Facility: CLINIC | Age: 88
End: 2023-10-24

## 2023-10-24 ENCOUNTER — HOSPITAL ENCOUNTER (OUTPATIENT)
Facility: HOSPITAL | Age: 88
Setting detail: OUTPATIENT SURGERY
Discharge: HOME | End: 2023-10-24
Attending: UROLOGY | Admitting: UROLOGY
Payer: MEDICARE

## 2023-10-24 VITALS
DIASTOLIC BLOOD PRESSURE: 69 MMHG | TEMPERATURE: 96.6 F | HEART RATE: 70 BPM | OXYGEN SATURATION: 100 % | SYSTOLIC BLOOD PRESSURE: 108 MMHG | RESPIRATION RATE: 18 BRPM

## 2023-10-24 PROCEDURE — NC001 PR NO CHARGE: Performed by: UROLOGY

## 2023-10-24 PROCEDURE — A4648 IMPLANTABLE TISSUE MARKER: HCPCS | Performed by: UROLOGY

## 2023-10-24 PROCEDURE — 55876 PLACE RT DEVICE/MARKER PROS: CPT | Performed by: UROLOGY

## 2023-10-24 PROCEDURE — 55874 TPRNL PLMT BIODEGRDABL MATRL: CPT | Performed by: UROLOGY

## 2023-10-24 PROCEDURE — C1889 IMPLANT/INSERT DEVICE, NOC: HCPCS | Performed by: UROLOGY

## 2023-10-24 DEVICE — STERILE PLACEMENT NEEDLES (17GA ETW X 20CM) WITH BONE WAX AND (1.2 X 3MM) SOFT TISSUE GOLD MARKER [3]
Type: IMPLANTABLE DEVICE | Site: PERIANAL | Status: FUNCTIONAL
Brand: FIDUCIAL MARKER KIT

## 2023-10-24 DEVICE — SPACEOAR SYSTEMS
Type: IMPLANTABLE DEVICE | Site: PERIANAL | Status: FUNCTIONAL
Brand: SPACEOAR VUE™ SYSTEM - 10ML

## 2023-10-24 RX ORDER — BUPIVACAINE HYDROCHLORIDE 5 MG/ML
INJECTION, SOLUTION EPIDURAL; INTRACAUDAL AS NEEDED
Status: DISCONTINUED | OUTPATIENT
Start: 2023-10-24 | End: 2023-10-24 | Stop reason: HOSPADM

## 2023-10-24 RX ORDER — CEFAZOLIN SODIUM 2 G/50ML
2000 SOLUTION INTRAVENOUS ONCE
Status: COMPLETED | OUTPATIENT
Start: 2023-10-24 | End: 2023-10-24

## 2023-10-24 RX ORDER — SODIUM CHLORIDE 9 MG/ML
INJECTION, SOLUTION INTRAVENOUS CONTINUOUS PRN
Status: DISCONTINUED | OUTPATIENT
Start: 2023-10-24 | End: 2023-10-24

## 2023-10-24 RX ORDER — LIDOCAINE HYDROCHLORIDE 20 MG/ML
INJECTION, SOLUTION EPIDURAL; INFILTRATION; INTRACAUDAL; PERINEURAL AS NEEDED
Status: DISCONTINUED | OUTPATIENT
Start: 2023-10-24 | End: 2023-10-24 | Stop reason: HOSPADM

## 2023-10-24 RX ORDER — ACETAMINOPHEN 325 MG/1
975 TABLET ORAL ONCE
Status: COMPLETED | OUTPATIENT
Start: 2023-10-24 | End: 2023-10-24

## 2023-10-24 RX ORDER — PROPOFOL 10 MG/ML
INJECTION, EMULSION INTRAVENOUS AS NEEDED
Status: DISCONTINUED | OUTPATIENT
Start: 2023-10-24 | End: 2023-10-24

## 2023-10-24 RX ORDER — LIDOCAINE HYDROCHLORIDE 10 MG/ML
INJECTION, SOLUTION EPIDURAL; INFILTRATION; INTRACAUDAL; PERINEURAL AS NEEDED
Status: DISCONTINUED | OUTPATIENT
Start: 2023-10-24 | End: 2023-10-24

## 2023-10-24 RX ORDER — PROPOFOL 10 MG/ML
INJECTION, EMULSION INTRAVENOUS CONTINUOUS PRN
Status: DISCONTINUED | OUTPATIENT
Start: 2023-10-24 | End: 2023-10-24

## 2023-10-24 RX ADMIN — SODIUM CHLORIDE: 0.9 INJECTION, SOLUTION INTRAVENOUS at 12:01

## 2023-10-24 RX ADMIN — LIDOCAINE HYDROCHLORIDE 30 MG: 10 INJECTION, SOLUTION EPIDURAL; INFILTRATION; INTRACAUDAL; PERINEURAL at 12:12

## 2023-10-24 RX ADMIN — ACETAMINOPHEN 650 MG: 325 TABLET, FILM COATED ORAL at 13:34

## 2023-10-24 RX ADMIN — CEFAZOLIN SODIUM 2000 MG: 2 SOLUTION INTRAVENOUS at 11:49

## 2023-10-24 RX ADMIN — PROPOFOL 80 MG: 10 INJECTION, EMULSION INTRAVENOUS at 12:12

## 2023-10-24 RX ADMIN — PROPOFOL 100 MCG/KG/MIN: 10 INJECTION, EMULSION INTRAVENOUS at 12:12

## 2023-10-24 NOTE — TELEPHONE ENCOUNTER
I called Germania Peoples to schedule a new patient appointment with the Cancer Risk and Genetics Program.      Outcome:  Patient is not interested in a genetics appointment at this time, I will close the referral.

## 2023-10-24 NOTE — INTERVAL H&P NOTE
H&P reviewed. After examining the patient I find no changes in the patients condition since the H&P had been written. Vitals:    10/24/23 1045   BP: 137/66   Pulse: 84   Resp: 18   Temp: (!) 96.4 °F (35.8 °C)   SpO2: 98%   Procedure and risks reviewed with the patient in the ambulatory unit. Consent form signed.

## 2023-10-24 NOTE — DISCHARGE INSTR - AVS FIRST PAGE
Rest and drink plenty of fluids. It is normal to have blood in the urine and some rectal bleeding. Blood in the semen will occur when sexually active- this can last several months depending on how sexually active you are. Use tylenol or ibuprofen for pain. An ice pack to the perineum can also be helpful. Call the office or seek medical attention for fever, heavy urinary or rectal bleeding, or difficulty voiding.  If you are on anticoagulation this can be resumed once urine has cleared or upon the instruction of your cardiologist or internist.
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
## 2023-10-24 NOTE — ANESTHESIA POSTPROCEDURE EVALUATION
Post-Op Assessment Note    CV Status:  Stable  Pain Score: 0    Pain management: adequate     Mental Status:  Alert, awake and sleepy   Hydration Status:  Euvolemic   PONV Controlled:  Controlled   Airway Patency:  Patent      Post Op Vitals Reviewed: Yes      Staff: CRNA, Anesthesiologist   Comments: Report given to recovering RN, VSS. Pt states he is comfortable        There were no known notable events for this encounter.     BP   123/66   Temp     Pulse  68   Resp   16   SpO2   96

## 2023-10-24 NOTE — H&P
HISTORY AND PHYSICAL  ? ? Patient Name: Fran Alvarez  Patient MRN: [id number]  Attending Provider: Hector Finch MD  Service: Urology  Chief Complaint    Prostate cancer    HPI   Fran Alvarez is a 80 y.o. male with prostate cancer. He has elected radiation therapy. I plan insertion of fiducial markers and SpaceOAR. Potential risks and complications discussed, and informed consent was given by the patient. Medications  Meds/Allergies        Prior to Admission Medications   Prescriptions Last Dose Informant Patient Reported?  Taking?   levothyroxine 75 mcg tablet 10/24/2023 Self No Yes   Sig: TAKE 1 TABLET(75 MCG) BY MOUTH DAILY   meclizine (ANTIVERT) 25 mg tablet   No No   Sig: Take 1 tablet (25 mg total) by mouth every 8 (eight) hours as needed for dizziness   tadalafil (CIALIS) 5 MG tablet  Self No No   Sig: Take 1 tablet (5 mg total) by mouth in the morning      Facility-Administered Medications: None       Current Facility-Administered Medications:     ceFAZolin (ANCEF) IVPB (premix in dextrose) 2,000 mg 50 mL, 2,000 mg, Intravenous, Once, Hector Finch MD, Last Rate: 100 mL/hr at 10/24/23 1149, 2,000 mg at 10/24/23 1149  Review of Systems  10 point review of systems negative except as noted in HPI  Allergies  No Known Allergies  PMH  Past Medical History:   Diagnosis Date    Abnormal loss of weight     last assessed: 4/24/13    Diverticulitis     Diverticulosis     Prostate cancer ([facility])     Skin cancer     Type 2 diabetes mellitus ([facility])      Past surgical history  Past Surgical History:   Procedure Laterality Date    APPENDECTOMY      Age 12    CATARACT EXTRACTION      right 5/2002, left 12/2003    COLON SURGERY      for diverticulitis    COLONOSCOPY      done 5/5/2009, mild diverticulosis noted, recheck in 3 years    FL INJECTION LEFT HIP (NON ARTHROGRAM)  11/01/2022    FL INJECTION RIGHT HIP (NON ARTHROGRAM)  11/01/2022    INNER EAR SURGERY Left     Cochlear device implantation; resolved: Sep 2012    MOHS SURGERY Left     left wrist    SHOULDER SURGERY  05/2007    right rotator cuff     Social history  Social History     Tobacco Use    Smoking status: Never    Smokeless tobacco: Never   Vaping Use    Vaping Use: Never used   Substance Use Topics    Alcohol use: Yes     Comment: social drinker as per [employer]    Drug use: No     ?  Physical Exam    .vs  /66   Pulse 84   Temp (!) 96.4 °F (35.8 °C) (Tympanic)   Resp 18   SpO2 98%   General appearance: alert and oriented, in no acute distress  Head: Normocephalic, without obvious abnormality, atraumatic  Neck: no JVD and supple, symmetrical, trachea midline  Back: symmetric, no curvature. ROM normal. No CVA tenderness.   Lungs: clear to auscultation bilaterally  Heart: regular rate and rhythm  Abdomen: soft, non-tender; bowel sounds normal; no masses,  no organomegaly  Extremities: extremities normal, warm and well-perfused; no cyanosis, clubbing, or edema  Neurologic: Grossly normal  Leonarda Walker MD

## 2023-10-25 ENCOUNTER — TELEPHONE (OUTPATIENT)
Dept: NUTRITION | Facility: CLINIC | Age: 88
End: 2023-10-25

## 2023-10-25 NOTE — TELEPHONE ENCOUNTER
Reached out to Louis to [facility] about scheduling a follow up appt. Spoke with wife, Vic Lundborg. She reports Louis is getting his simulation on 11/1, and will start RT 1-2 weeks after. Elaine Fitting reports Louis is doing OK- he is having difficulty chewing d/t loose fitting dentures so he's been eating a softer diet. She hasn't been able to cook as much d/t recent surgery. Discussed some ideas for easy/prepared meals for Bassam. He is drinking Ensure complete shakes once/day around lunch time. She is concerned with his protein at breakfast, as sometimes he only eats oatmeal or cream of wheat. Recommended she add some protein powder to this to increase the protein. Offered to reach back out in about 2 weeks once RT schedule is made to schedule follow up appt. Vic Lundborg was appreciative of this and was encouraged to reach out to RD in the meantime as needed.

## 2023-10-31 ENCOUNTER — OFFICE VISIT (OUTPATIENT)
Dept: OBGYN CLINIC | Facility: HOSPITAL | Age: 88
End: 2023-10-31
Payer: MEDICARE

## 2023-10-31 VITALS
SYSTOLIC BLOOD PRESSURE: 107 MMHG | HEIGHT: 70 IN | HEART RATE: 86 BPM | WEIGHT: 142 LBS | BODY MASS INDEX: 20.33 KG/M2 | DIASTOLIC BLOOD PRESSURE: 70 MMHG

## 2023-10-31 DIAGNOSIS — M17.0 PRIMARY OSTEOARTHRITIS OF BOTH KNEES: Primary | ICD-10-CM

## 2023-10-31 PROCEDURE — 20610 DRAIN/INJ JOINT/BURSA W/O US: CPT | Performed by: ORTHOPAEDIC SURGERY

## 2023-10-31 PROCEDURE — 99213 OFFICE O/P EST LOW 20 MIN: CPT | Performed by: ORTHOPAEDIC SURGERY

## 2023-10-31 RX ORDER — BETAMETHASONE SODIUM PHOSPHATE AND BETAMETHASONE ACETATE 3; 3 MG/ML; MG/ML
12 INJECTION, SUSPENSION INTRA-ARTICULAR; INTRALESIONAL; INTRAMUSCULAR; SOFT TISSUE
Status: COMPLETED | OUTPATIENT
Start: 2023-10-31 | End: 2023-10-31

## 2023-10-31 RX ORDER — LIDOCAINE HYDROCHLORIDE 10 MG/ML
2 INJECTION, SOLUTION INFILTRATION; PERINEURAL
Status: COMPLETED | OUTPATIENT
Start: 2023-10-31 | End: 2023-10-31

## 2023-10-31 RX ORDER — BUPIVACAINE HYDROCHLORIDE 2.5 MG/ML
2 INJECTION, SOLUTION INFILTRATION; PERINEURAL
Status: COMPLETED | OUTPATIENT
Start: 2023-10-31 | End: 2023-10-31

## 2023-10-31 RX ADMIN — LIDOCAINE HYDROCHLORIDE 2 ML: 10 INJECTION, SOLUTION INFILTRATION; PERINEURAL at 10:15

## 2023-10-31 RX ADMIN — BUPIVACAINE HYDROCHLORIDE 2 ML: 2.5 INJECTION, SOLUTION INFILTRATION; PERINEURAL at 10:15

## 2023-10-31 RX ADMIN — BETAMETHASONE SODIUM PHOSPHATE AND BETAMETHASONE ACETATE 12 MG: 3; 3 INJECTION, SUSPENSION INTRA-ARTICULAR; INTRALESIONAL; INTRAMUSCULAR; SOFT TISSUE at 10:15

## 2023-10-31 NOTE — PROGRESS NOTES
Assessment/Plan:     Mr. Salas Lugo presents today for repeat evaluation of bilateral knee pain in the setting of known bilateral knee osteoarthritis. Bilateral knee corticosteroid injections provided today. The patient tolerated the procedure well. Postinjection instructions provided. Rest, ice, compression, elevation as needed for symptom control. NSAIDs as needed for symptom exacerbation. Return to care in 3 months        Subjective: Duran Navarro is a 80 y.o. male presents today for repeat evaluation of bilateral knee pain in the setting of known bilateral knee osteoarthritis. He last saw us 7/25/2023 at which point bilateral knee steroid injections were provided. He tolerated these well. He reports continued relief from these injections and requests additional steroid injections today. His pain is rated as 7 out of 10, worse with weightbearing, made better with rest.  He does report stiffness if in a prolonged seated position. Objective:         General :   alert and oriented, in no acute distress   Gait: Normal. The patient can bear weight on the injured extremity. Bilateral Lower Extremity  Knee Effusion:  None. Ecchymosis:  none   Knee ROM:  0 to 100 degrees with subpatellar   crepitance. Patella:  Patella does track normally. Tenderness: medial joint line and lateral joint line   Stability:  Medial collateral ligament: negative  Lateral collateral ligament: negative     Sensation:   intact to light touch   Pulses: normal DP and PT pulses     Imaging  No new imaging obtained today. Large joint arthrocentesis: bilateral knee  Universal Protocol:  Consent: Verbal consent obtained. Risks and benefits: risks, benefits and alternatives were discussed  Consent given by: patient  Time out: Immediately prior to procedure a "time out" was called to verify the correct patient, procedure, equipment, support staff and site/side marked as required.   Patient understanding: patient states understanding of the procedure being performed  Site marked: the operative site was marked  Patient identity confirmed: verbally with patient  Supporting Documentation  Indications: pain   Procedure Details  Location: knee - bilateral knee  Needle size: 22 G  Ultrasound guidance: no  Approach: anterolateral    Medications (Right): 2 mL bupivacaine 0.25 %; 2 mL lidocaine 1 %; 12 mg betamethasone acetate-betamethasone sodium phosphate 6 (3-3) mg/mLMedications (Left): 2 mL bupivacaine 0.25 %; 2 mL lidocaine 1 %; 12 mg betamethasone acetate-betamethasone sodium phosphate 6 (3-3) mg/mL   Patient tolerance: patient tolerated the procedure well with no immediate complications  Dressing:  Sterile dressing applied

## 2023-11-01 ENCOUNTER — APPOINTMENT (OUTPATIENT)
Dept: RADIATION ONCOLOGY | Facility: CLINIC | Age: 88
End: 2023-11-01
Payer: MEDICARE

## 2023-11-01 PROCEDURE — 77334 RADIATION TREATMENT AID(S): CPT | Performed by: RADIOLOGY

## 2023-11-02 ENCOUNTER — PATIENT OUTREACH (OUTPATIENT)
Dept: HEMATOLOGY ONCOLOGY | Facility: CLINIC | Age: 88
End: 2023-11-02

## 2023-11-02 NOTE — PROGRESS NOTES
Outreach made to patient. I spoke to patients wife, she states that they do not have any other questions or concerns at this time. Wife knows to reach out if any arise in the future.

## 2023-11-13 ENCOUNTER — HOSPITAL ENCOUNTER (OUTPATIENT)
Dept: ULTRASOUND IMAGING | Facility: MEDICAL CENTER | Age: 88
Discharge: HOME | End: 2023-11-13
Payer: MEDICARE

## 2023-11-13 DIAGNOSIS — N13.30 HYDRONEPHROSIS OF LEFT KIDNEY: ICD-10-CM

## 2023-11-13 PROCEDURE — 76775 US EXAM ABDO BACK WALL LIM: CPT

## 2023-11-13 NOTE — LETTER
[address] [place] [address]  [address] [address]      November 21, 2023    MRN: [id number]     Phone: [phone]     Dear Mr. Kavya Garza recently had a(n) Ultrasound performed on 11/13/2023 at  [address] [place] [address] that was requested by Romario Cosby[address]. The study was reviewed by a radiologist, which is a physician who specializes in medical imaging. The radiologist issued a report describing his or her findings. In that report there was a finding that the radiologist felt warranted further discussion with your health care provider and that discussion would be beneficial to you. The results were sent to [address] on 11/16/2023  3:30 PM. We recommend that you contact [address] at [phone] or set up an appointment to discuss the results of the imaging test. If you have already [facility] [address] [place] [address] regarding the results of your study, you can disregard this letter. This letter is not meant to alarm you, but intended to encourage you to follow-up on your results with the provider that sent you for the imaging study. In addition, we have enclosed answers to frequently asked questions by other patients who have also received a letter to review results with their health care provider (see page two). Thank you for choosing [address] [place] [address] for your medical imaging needs. FREQUENTLY ASKED QUESTIONS    Why am I receiving this letter? [address] requires us to notify patients who have findings on imaging exams that may require more testing or follow-up with a health professional within the next 3 months.         How serious is the finding on the imaging test?  This letter is sent to all patients who may need follow-up or more testing within the next 3 months. Receiving this letter does not necessarily mean you have a life-threatening imaging finding or disease. Recommendations in the radiologist’s imaging report are general in nature and it is up to your healthcare provider to say whether those recommendations make sense for your situation. You are strongly encouraged to talk to your health care provider about the results and ask whether additional steps need to be taken. Where can I get a copy of the final report for my recent radiology exam?  To get a full copy of the report you can access your records online at [email or web address] or please contact [facility] Medical Records Department at [phone] Monday through Friday between 8 am and 6 pm.         What do I need to do now? Please contact your health care provider who requested the imaging study to discuss what further actions (if any) are needed. You may have already heard from (your ordering provider) in regard to this test in which case you can disregard this letter. NOTICE IN ACCORDANCE WITH THE [place] “PATIENT TEST RESULT INFORMATION ACT OF 2018”    You are receiving this notice as a result of a determination by your diagnostic imaging service that further discussions of your test results are warranted and would be beneficial to you. The complete results of your test or tests have been or will be sent to the health care practitioner that ordered the test or tests. It is recommended that you contact your health care practitioner to discuss your results as soon as possible.

## 2023-11-15 ENCOUNTER — APPOINTMENT (OUTPATIENT)
Dept: RADIATION ONCOLOGY | Facility: CLINIC | Age: 88
End: 2023-11-15
Payer: MEDICARE

## 2023-11-16 PROCEDURE — 77300 RADIATION THERAPY DOSE PLAN: CPT | Performed by: RADIOLOGY

## 2023-11-16 PROCEDURE — 77338 DESIGN MLC DEVICE FOR IMRT: CPT | Performed by: RADIOLOGY

## 2023-11-16 PROCEDURE — 77301 RADIOTHERAPY DOSE PLAN IMRT: CPT | Performed by: RADIOLOGY

## 2023-11-16 NOTE — RESULT ENCOUNTER NOTE
Please let patient know his ultrasound of the kidney and bladder did not reveal any hydronephrosis. His bladder did not have any lesions. He had urinary bladder wall thickening likely due to bladder outlet obstruction versus acute cystitis. Please evaluate patient for any symptoms of UTI. If he is having any urinary symptoms please have him do urine micro and culture.  otherwise he can follow-up as scheduled in December

## 2023-11-17 ENCOUNTER — TELEPHONE (OUTPATIENT)
Dept: UROLOGY | Facility: CLINIC | Age: 88
End: 2023-11-17

## 2023-11-17 DIAGNOSIS — N52.9 ERECTILE DYSFUNCTION, UNSPECIFIED ERECTILE DYSFUNCTION TYPE: Primary | ICD-10-CM

## 2023-11-17 RX ORDER — TADALAFIL 20 MG/1
20 TABLET ORAL DAILY PRN
Qty: 10 TABLET | Refills: 5 | Status: SHIPPED | OUTPATIENT
Start: 2023-11-17

## 2023-11-17 NOTE — TELEPHONE ENCOUNTER
----- Message from Steph Ochoa Dr sent at 11/16/2023  4:03 PM EST -----  Please let patient know his ultrasound of the kidney and bladder did not reveal any hydronephrosis. His bladder did not have any lesions. He had urinary bladder wall thickening likely due to bladder outlet obstruction versus acute cystitis. Please evaluate patient for any symptoms of UTI. If he is having any urinary symptoms please have him do urine micro and culture.  otherwise he can follow-up as scheduled in December

## 2023-11-19 ENCOUNTER — APPOINTMENT (OUTPATIENT)
Dept: RADIATION ONCOLOGY | Facility: CLINIC | Age: 88
End: 2023-11-19
Payer: MEDICARE

## 2023-11-19 PROCEDURE — 77385 HB NTSTY MODUL RAD TX DLVR SMPL: CPT | Performed by: RADIOLOGY

## 2023-11-19 PROCEDURE — 77427 RADIATION TX MANAGEMENT X5: CPT | Performed by: RADIOLOGY

## 2023-11-19 PROCEDURE — 77014 CHG CT GUIDANCE RADIATION THERAPY FLDS PLACEMENT: CPT | Performed by: RADIOLOGY

## 2023-11-20 ENCOUNTER — PATIENT OUTREACH (OUTPATIENT)
Dept: CASE MANAGEMENT | Facility: HOSPITAL | Age: 88
End: 2023-11-20

## 2023-11-20 ENCOUNTER — TELEPHONE (OUTPATIENT)
Dept: NUTRITION | Facility: CLINIC | Age: 88
End: 2023-11-20

## 2023-11-20 PROCEDURE — 77385 HB NTSTY MODUL RAD TX DLVR SMPL: CPT | Performed by: RADIOLOGY

## 2023-11-20 PROCEDURE — 77014 CHG CT GUIDANCE RADIATION THERAPY FLDS PLACEMENT: CPT | Performed by: RADIOLOGY

## 2023-11-20 NOTE — TELEPHONE ENCOUNTER
The patient's wife, Tatiana Edwards, called back. Informed the pt and his wife that his ultrasound of the kidney and bladder did not reveal any hydronephrosis. His bladder did not have any lesions. He had urinary bladder wall thickening likely due to bladder outlet obstruction versus acute cystitis. Patient's wife stated that she hasn't noticed any changes urologically. She asked the patient if he has had any fevers, chills, frequency, burning with urination, or hematuria. Patient denied any symptoms. Informed the pt's wife that the patient can f/u as scheduled in Dec. Patient's wife confirmed understanding.

## 2023-11-20 NOTE — TELEPHONE ENCOUNTER
Called Bassam to touch [facility] about scheduling an RD follow up appt now that radiation tx schedule is made. No answer. Left VM explaining reason for call and encouraging him to call back if he would yuko to schedule an appointment.

## 2023-11-21 PROCEDURE — 77014 CHG CT GUIDANCE RADIATION THERAPY FLDS PLACEMENT: CPT | Performed by: RADIOLOGY

## 2023-11-21 PROCEDURE — 77385 HB NTSTY MODUL RAD TX DLVR SMPL: CPT | Performed by: RADIOLOGY

## 2023-11-22 PROCEDURE — 77385 HB NTSTY MODUL RAD TX DLVR SMPL: CPT | Performed by: INTERNAL MEDICINE

## 2023-11-27 ENCOUNTER — PATIENT OUTREACH (OUTPATIENT)
Dept: CASE MANAGEMENT | Facility: HOSPITAL | Age: 88
End: 2023-11-27

## 2023-11-27 PROCEDURE — 77336 RADIATION PHYSICS CONSULT: CPT | Performed by: RADIOLOGY

## 2023-11-27 PROCEDURE — 77385 HB NTSTY MODUL RAD TX DLVR SMPL: CPT | Performed by: RADIOLOGY

## 2023-11-27 PROCEDURE — 77014 CHG CT GUIDANCE RADIATION THERAPY FLDS PLACEMENT: CPT | Performed by: RADIOLOGY

## 2023-11-29 PROCEDURE — 77014 CHG CT GUIDANCE RADIATION THERAPY FLDS PLACEMENT: CPT | Performed by: RADIOLOGY

## 2023-11-29 PROCEDURE — 77385 HB NTSTY MODUL RAD TX DLVR SMPL: CPT | Performed by: RADIOLOGY

## 2023-11-29 PROCEDURE — 77427 RADIATION TX MANAGEMENT X5: CPT | Performed by: RADIOLOGY

## 2023-11-30 PROCEDURE — 77014 CHG CT GUIDANCE RADIATION THERAPY FLDS PLACEMENT: CPT | Performed by: RADIOLOGY

## 2023-11-30 PROCEDURE — 77385 HB NTSTY MODUL RAD TX DLVR SMPL: CPT | Performed by: RADIOLOGY

## 2023-12-01 ENCOUNTER — APPOINTMENT (OUTPATIENT)
Dept: RADIATION ONCOLOGY | Facility: CLINIC | Age: 88
End: 2023-12-01
Payer: MEDICARE

## 2023-12-01 PROCEDURE — 77014 CHG CT GUIDANCE RADIATION THERAPY FLDS PLACEMENT: CPT | Performed by: RADIOLOGY

## 2023-12-01 PROCEDURE — 77385 HB NTSTY MODUL RAD TX DLVR SMPL: CPT | Performed by: RADIOLOGY

## 2023-12-04 ENCOUNTER — TELEPHONE (OUTPATIENT)
Dept: UROLOGY | Facility: CLINIC | Age: 88
End: 2023-12-04

## 2023-12-04 ENCOUNTER — APPOINTMENT (OUTPATIENT)
Dept: RADIATION ONCOLOGY | Facility: CLINIC | Age: 88
End: 2023-12-04
Payer: MEDICARE

## 2023-12-04 ENCOUNTER — APPOINTMENT (OUTPATIENT)
Dept: RADIATION ONCOLOGY | Facility: CLINIC | Age: 88
End: 2023-12-04
Attending: RADIOLOGY
Payer: MEDICARE

## 2023-12-04 PROCEDURE — 77385 HB NTSTY MODUL RAD TX DLVR SMPL: CPT | Performed by: RADIOLOGY

## 2023-12-04 PROCEDURE — 77014 CHG CT GUIDANCE RADIATION THERAPY FLDS PLACEMENT: CPT | Performed by: RADIOLOGY

## 2023-12-04 NOTE — TELEPHONE ENCOUNTER
Spoke to the patients wife 12/4/23 informing her that her husbands appointment for 12/22/23 with Tawnya Hale had to be canceled due to her not going to be in the office 12/22/23, someone from the office will call back to reschedule

## 2023-12-05 ENCOUNTER — APPOINTMENT (OUTPATIENT)
Dept: RADIATION ONCOLOGY | Facility: CLINIC | Age: 88
End: 2023-12-05
Payer: MEDICARE

## 2023-12-05 PROCEDURE — 77385 HB NTSTY MODUL RAD TX DLVR SMPL: CPT | Performed by: RADIOLOGY

## 2023-12-05 PROCEDURE — 77014 CHG CT GUIDANCE RADIATION THERAPY FLDS PLACEMENT: CPT | Performed by: RADIOLOGY

## 2023-12-05 PROCEDURE — 77336 RADIATION PHYSICS CONSULT: CPT | Performed by: RADIOLOGY

## 2023-12-06 ENCOUNTER — APPOINTMENT (OUTPATIENT)
Dept: RADIATION ONCOLOGY | Facility: CLINIC | Age: 88
End: 2023-12-06
Payer: MEDICARE

## 2023-12-06 PROCEDURE — 77385 HB NTSTY MODUL RAD TX DLVR SMPL: CPT | Performed by: INTERNAL MEDICINE

## 2023-12-06 PROCEDURE — 77014 CHG CT GUIDANCE RADIATION THERAPY FLDS PLACEMENT: CPT | Performed by: INTERNAL MEDICINE

## 2023-12-06 PROCEDURE — 77427 RADIATION TX MANAGEMENT X5: CPT | Performed by: RADIOLOGY

## 2023-12-07 ENCOUNTER — APPOINTMENT (OUTPATIENT)
Dept: RADIATION ONCOLOGY | Facility: CLINIC | Age: 88
End: 2023-12-07
Payer: MEDICARE

## 2023-12-07 PROCEDURE — 77014 CHG CT GUIDANCE RADIATION THERAPY FLDS PLACEMENT: CPT | Performed by: RADIOLOGY

## 2023-12-07 PROCEDURE — 77385 HB NTSTY MODUL RAD TX DLVR SMPL: CPT | Performed by: RADIOLOGY

## 2023-12-08 ENCOUNTER — APPOINTMENT (OUTPATIENT)
Dept: RADIATION ONCOLOGY | Facility: CLINIC | Age: 88
End: 2023-12-08
Payer: MEDICARE

## 2023-12-08 PROCEDURE — 77014 CHG CT GUIDANCE RADIATION THERAPY FLDS PLACEMENT: CPT | Performed by: RADIOLOGY

## 2023-12-08 PROCEDURE — 77385 HB NTSTY MODUL RAD TX DLVR SMPL: CPT | Performed by: RADIOLOGY

## 2023-12-11 ENCOUNTER — APPOINTMENT (OUTPATIENT)
Dept: RADIATION ONCOLOGY | Facility: CLINIC | Age: 88
End: 2023-12-11
Payer: MEDICARE

## 2023-12-12 ENCOUNTER — APPOINTMENT (OUTPATIENT)
Dept: RADIATION ONCOLOGY | Facility: CLINIC | Age: 88
End: 2023-12-12
Payer: MEDICARE

## 2023-12-12 PROCEDURE — 77014 CHG CT GUIDANCE RADIATION THERAPY FLDS PLACEMENT: CPT | Performed by: RADIOLOGY

## 2023-12-12 PROCEDURE — 77385 HB NTSTY MODUL RAD TX DLVR SMPL: CPT | Performed by: RADIOLOGY

## 2023-12-13 ENCOUNTER — APPOINTMENT (OUTPATIENT)
Dept: RADIATION ONCOLOGY | Facility: CLINIC | Age: 88
End: 2023-12-13
Attending: RADIOLOGY
Payer: MEDICARE

## 2023-12-13 ENCOUNTER — APPOINTMENT (OUTPATIENT)
Dept: RADIATION ONCOLOGY | Facility: CLINIC | Age: 88
End: 2023-12-13
Payer: MEDICARE

## 2023-12-13 PROCEDURE — 77385 HB NTSTY MODUL RAD TX DLVR SMPL: CPT | Performed by: INTERNAL MEDICINE

## 2023-12-13 PROCEDURE — 77336 RADIATION PHYSICS CONSULT: CPT | Performed by: RADIOLOGY

## 2023-12-13 PROCEDURE — 77014 CHG CT GUIDANCE RADIATION THERAPY FLDS PLACEMENT: CPT | Performed by: INTERNAL MEDICINE

## 2023-12-14 ENCOUNTER — APPOINTMENT (OUTPATIENT)
Dept: RADIATION ONCOLOGY | Facility: CLINIC | Age: 88
End: 2023-12-14
Payer: MEDICARE

## 2023-12-14 PROCEDURE — 77385 HB NTSTY MODUL RAD TX DLVR SMPL: CPT | Performed by: RADIOLOGY

## 2023-12-14 PROCEDURE — 77427 RADIATION TX MANAGEMENT X5: CPT | Performed by: RADIOLOGY

## 2023-12-15 ENCOUNTER — APPOINTMENT (OUTPATIENT)
Dept: RADIATION ONCOLOGY | Facility: CLINIC | Age: 88
End: 2023-12-15
Payer: MEDICARE

## 2023-12-15 PROCEDURE — 77014 CHG CT GUIDANCE RADIATION THERAPY FLDS PLACEMENT: CPT | Performed by: RADIOLOGY

## 2023-12-15 PROCEDURE — 77385 HB NTSTY MODUL RAD TX DLVR SMPL: CPT | Performed by: RADIOLOGY

## 2023-12-18 ENCOUNTER — APPOINTMENT (OUTPATIENT)
Dept: RADIATION ONCOLOGY | Facility: CLINIC | Age: 88
End: 2023-12-18
Payer: MEDICARE

## 2023-12-18 PROCEDURE — 77014 CHG CT GUIDANCE RADIATION THERAPY FLDS PLACEMENT: CPT | Performed by: RADIOLOGY

## 2023-12-18 PROCEDURE — 77385 HB NTSTY MODUL RAD TX DLVR SMPL: CPT | Performed by: RADIOLOGY

## 2023-12-19 ENCOUNTER — APPOINTMENT (OUTPATIENT)
Dept: RADIATION ONCOLOGY | Facility: CLINIC | Age: 88
End: 2023-12-19
Payer: MEDICARE

## 2023-12-19 PROCEDURE — 77014 CHG CT GUIDANCE RADIATION THERAPY FLDS PLACEMENT: CPT | Performed by: RADIOLOGY

## 2023-12-19 PROCEDURE — 77385 HB NTSTY MODUL RAD TX DLVR SMPL: CPT | Performed by: RADIOLOGY

## 2023-12-20 ENCOUNTER — APPOINTMENT (OUTPATIENT)
Dept: RADIATION ONCOLOGY | Facility: CLINIC | Age: 88
End: 2023-12-20
Payer: MEDICARE

## 2023-12-20 PROCEDURE — 77336 RADIATION PHYSICS CONSULT: CPT | Performed by: RADIOLOGY

## 2023-12-20 PROCEDURE — 77385 HB NTSTY MODUL RAD TX DLVR SMPL: CPT | Performed by: INTERNAL MEDICINE

## 2023-12-20 PROCEDURE — 77014 CHG CT GUIDANCE RADIATION THERAPY FLDS PLACEMENT: CPT | Performed by: INTERNAL MEDICINE

## 2023-12-21 ENCOUNTER — APPOINTMENT (OUTPATIENT)
Dept: RADIATION ONCOLOGY | Facility: CLINIC | Age: 88
End: 2023-12-21
Payer: MEDICARE

## 2023-12-21 PROCEDURE — 77427 RADIATION TX MANAGEMENT X5: CPT | Performed by: INTERNAL MEDICINE

## 2023-12-21 PROCEDURE — 77014 CHG CT GUIDANCE RADIATION THERAPY FLDS PLACEMENT: CPT | Performed by: RADIOLOGY

## 2023-12-21 PROCEDURE — 77385 HB NTSTY MODUL RAD TX DLVR SMPL: CPT | Performed by: RADIOLOGY

## 2023-12-22 ENCOUNTER — TELEPHONE (OUTPATIENT)
Dept: RADIATION ONCOLOGY | Facility: CLINIC | Age: 88
End: 2023-12-22

## 2023-12-22 ENCOUNTER — PATIENT OUTREACH (OUTPATIENT)
Dept: HEMATOLOGY ONCOLOGY | Facility: CLINIC | Age: 88
End: 2023-12-22

## 2023-12-22 ENCOUNTER — APPOINTMENT (OUTPATIENT)
Dept: RADIATION ONCOLOGY | Facility: CLINIC | Age: 88
End: 2023-12-22
Payer: MEDICARE

## 2023-12-22 PROCEDURE — 77014 CHG CT GUIDANCE RADIATION THERAPY FLDS PLACEMENT: CPT | Performed by: RADIOLOGY

## 2023-12-22 PROCEDURE — 77385 HB NTSTY MODUL RAD TX DLVR SMPL: CPT | Performed by: RADIOLOGY

## 2023-12-22 NOTE — PROGRESS NOTES
MID ASSESSMENT      Are you having any side effects from your treatment?  -pain in abdomen for about a week, no appetite, some diarrhea  Patient takes gasx 3 times a day    Are you eating and drinking properly?  - No appetite, asked wife to contact patients dietician     Do you have any urinary issues?  - No    Are you having any pain?  -Abdominal pain, taking gasx TID per wife    Have needs changed for a palliative care referral?  - No    How would you describe your mood (Calm, Anxious, Depressed, Tired, Overwhelmed)?  - overwhelmed    Do you know when your upcoming appointments are?  - Yes, MyChart    Do you have a good support system?  - Yes, Wife    Are you interested in any support groups?  - No    Do you have any questions or concerns regarding your treatment plan?  - No      Outreach made to patient. Spoke to wife. We over MID assessment. Patient is having some side effects from treatment. He has very little appetite, wife will be reaching out to his Dietician. He is on GasX TID and is not helping, started this on 12/18/23. Wife is wondering if there is something else they can try for his abdominal pain. I let wife know I will be sending message to [facility] office to address. She voiced understanding. She knows to call with any other questions or concerns.

## 2023-12-22 NOTE — TELEPHONE ENCOUNTER
Returned call to wife as per message rec'd from urology office regarding abdominal discomfort.  Wife states that he is taking the Gas-X 4x/day and is following low residue diet.  Denies diarrhea.  States that he doesn't want to eat because he feels like the pain is worse after he eats.  Discussed smaller, more frequent meals.  Can increase ensure from 1/day to 2 day to help maintain caloric intake.  Advised to go to ED for evaluation if pain worsens or is severe.  Will be seeing physician at next appointment on 12/26/23.    Kristine verbalized understanding and was appreciative of call back.

## 2023-12-23 ENCOUNTER — APPOINTMENT (OUTPATIENT)
Dept: RADIATION ONCOLOGY | Facility: CLINIC | Age: 88
End: 2023-12-23
Payer: MEDICARE

## 2023-12-26 ENCOUNTER — TELEPHONE (OUTPATIENT)
Dept: RADIATION ONCOLOGY | Facility: CLINIC | Age: 88
End: 2023-12-26

## 2023-12-26 ENCOUNTER — APPOINTMENT (OUTPATIENT)
Dept: RADIATION ONCOLOGY | Facility: CLINIC | Age: 88
End: 2023-12-26
Payer: MEDICARE

## 2023-12-26 NOTE — TELEPHONE ENCOUNTER
Wife called stated Bassam was having a fever and cold s/s Sunday night. Pt had fever, cough, runny nose, etc. States he took an at home covid test and it was positive. Discussed that if patient has fevers he should stay home until feeling better and then he can come in for radiation treatments. Explained that his time will be for later in the day and that he must wear a mask when he comes in. Discussed daily calls from the RTTs to see how he is doing and able to set up a time for radiation when he is better. Wife verbalized understanding and in agreement. Stated she will take the patient to urgent care, today. She is in agreement patient will not be getting radiation today.

## 2023-12-27 ENCOUNTER — APPOINTMENT (OUTPATIENT)
Dept: RADIATION ONCOLOGY | Facility: CLINIC | Age: 88
End: 2023-12-27
Payer: MEDICARE

## 2023-12-27 ENCOUNTER — APPOINTMENT (OUTPATIENT)
Dept: RADIATION ONCOLOGY | Facility: CLINIC | Age: 88
End: 2023-12-27
Attending: RADIOLOGY
Payer: MEDICARE

## 2023-12-27 PROCEDURE — 77014 CHG CT GUIDANCE RADIATION THERAPY FLDS PLACEMENT: CPT | Performed by: INTERNAL MEDICINE

## 2023-12-27 PROCEDURE — 77385 HB NTSTY MODUL RAD TX DLVR SMPL: CPT | Performed by: INTERNAL MEDICINE

## 2023-12-28 ENCOUNTER — APPOINTMENT (OUTPATIENT)
Dept: RADIATION ONCOLOGY | Facility: CLINIC | Age: 88
End: 2023-12-28
Payer: MEDICARE

## 2023-12-28 ENCOUNTER — APPOINTMENT (OUTPATIENT)
Dept: RADIATION ONCOLOGY | Facility: CLINIC | Age: 88
End: 2023-12-28
Attending: RADIOLOGY
Payer: MEDICARE

## 2023-12-28 PROCEDURE — 77385 HB NTSTY MODUL RAD TX DLVR SMPL: CPT | Performed by: RADIOLOGY

## 2023-12-28 PROCEDURE — 77014 CHG CT GUIDANCE RADIATION THERAPY FLDS PLACEMENT: CPT | Performed by: RADIOLOGY

## 2023-12-29 ENCOUNTER — APPOINTMENT (OUTPATIENT)
Dept: RADIATION ONCOLOGY | Facility: CLINIC | Age: 88
End: 2023-12-29
Attending: RADIOLOGY
Payer: MEDICARE

## 2023-12-29 ENCOUNTER — APPOINTMENT (OUTPATIENT)
Dept: RADIATION ONCOLOGY | Facility: CLINIC | Age: 88
End: 2023-12-29
Payer: MEDICARE

## 2023-12-29 PROCEDURE — 77336 RADIATION PHYSICS CONSULT: CPT | Performed by: INTERNAL MEDICINE

## 2023-12-29 PROCEDURE — 77385 HB NTSTY MODUL RAD TX DLVR SMPL: CPT | Performed by: INTERNAL MEDICINE

## 2023-12-29 PROCEDURE — 77014 CHG CT GUIDANCE RADIATION THERAPY FLDS PLACEMENT: CPT | Performed by: INTERNAL MEDICINE

## 2024-01-02 ENCOUNTER — APPOINTMENT (OUTPATIENT)
Dept: RADIATION ONCOLOGY | Facility: CLINIC | Age: 89
End: 2024-01-02
Attending: RADIOLOGY
Payer: MEDICARE

## 2024-01-02 DIAGNOSIS — E03.9 ACQUIRED HYPOTHYROIDISM: ICD-10-CM

## 2024-01-02 PROCEDURE — 77385 HB NTSTY MODUL RAD TX DLVR SMPL: CPT | Performed by: RADIOLOGY

## 2024-01-02 PROCEDURE — 77014 CHG CT GUIDANCE RADIATION THERAPY FLDS PLACEMENT: CPT | Performed by: RADIOLOGY

## 2024-01-02 PROCEDURE — 77427 RADIATION TX MANAGEMENT X5: CPT | Performed by: RADIOLOGY

## 2024-01-02 RX ORDER — LEVOTHYROXINE SODIUM 0.07 MG/1
TABLET ORAL
Qty: 90 TABLET | Refills: 3 | Status: SHIPPED | OUTPATIENT
Start: 2024-01-02

## 2024-01-03 PROCEDURE — 77014 CHG CT GUIDANCE RADIATION THERAPY FLDS PLACEMENT: CPT | Performed by: INTERNAL MEDICINE

## 2024-01-03 PROCEDURE — 77385 HB NTSTY MODUL RAD TX DLVR SMPL: CPT | Performed by: INTERNAL MEDICINE

## 2024-01-04 ENCOUNTER — TELEPHONE (OUTPATIENT)
Dept: NUTRITION | Facility: CLINIC | Age: 89
End: 2024-01-04

## 2024-01-04 PROCEDURE — 77014 CHG CT GUIDANCE RADIATION THERAPY FLDS PLACEMENT: CPT | Performed by: RADIOLOGY

## 2024-01-04 PROCEDURE — 77385 HB NTSTY MODUL RAD TX DLVR SMPL: CPT | Performed by: RADIOLOGY

## 2024-01-04 NOTE — TELEPHONE ENCOUNTER
Received message from [facility] Rn asking RD to get in touch with Bassam and his wife as he reports a decreased appetite, and he's been losing weight since starting RT. Called Bassam and wife Kristine. No answer. Left VM explaining reason for call and encouraging them to call to discuss Bassam's nutrition.

## 2024-01-05 PROCEDURE — 77385 HB NTSTY MODUL RAD TX DLVR SMPL: CPT | Performed by: INTERNAL MEDICINE

## 2024-01-05 PROCEDURE — 77014 CHG CT GUIDANCE RADIATION THERAPY FLDS PLACEMENT: CPT | Performed by: INTERNAL MEDICINE

## 2024-01-08 PROCEDURE — 77336 RADIATION PHYSICS CONSULT: CPT | Performed by: RADIOLOGY

## 2024-01-08 PROCEDURE — 77014 CHG CT GUIDANCE RADIATION THERAPY FLDS PLACEMENT: CPT | Performed by: RADIOLOGY

## 2024-01-08 PROCEDURE — 77385 HB NTSTY MODUL RAD TX DLVR SMPL: CPT | Performed by: RADIOLOGY

## 2024-01-08 NOTE — TELEPHONE ENCOUNTER
Received VM from spouse, Kristine, returning this RD's call. Called Kristine back. No answer. Left VM encouraging them to call back at their earliest convenience.

## 2024-01-09 PROCEDURE — 77385 HB NTSTY MODUL RAD TX DLVR SMPL: CPT | Performed by: RADIOLOGY

## 2024-01-09 PROCEDURE — 77014 CHG CT GUIDANCE RADIATION THERAPY FLDS PLACEMENT: CPT | Performed by: RADIOLOGY

## 2024-01-09 PROCEDURE — 77427 RADIATION TX MANAGEMENT X5: CPT | Performed by: RADIOLOGY

## 2024-01-10 PROCEDURE — 77385 HB NTSTY MODUL RAD TX DLVR SMPL: CPT | Performed by: INTERNAL MEDICINE

## 2024-01-10 PROCEDURE — 77014 CHG CT GUIDANCE RADIATION THERAPY FLDS PLACEMENT: CPT | Performed by: INTERNAL MEDICINE

## 2024-01-11 PROCEDURE — 77385 HB NTSTY MODUL RAD TX DLVR SMPL: CPT | Performed by: RADIOLOGY

## 2024-01-12 PROCEDURE — 77014 CHG CT GUIDANCE RADIATION THERAPY FLDS PLACEMENT: CPT | Performed by: RADIOLOGY

## 2024-01-12 PROCEDURE — 77385 HB NTSTY MODUL RAD TX DLVR SMPL: CPT | Performed by: RADIOLOGY

## 2024-01-15 PROCEDURE — 77385 HB NTSTY MODUL RAD TX DLVR SMPL: CPT | Performed by: RADIOLOGY

## 2024-01-15 PROCEDURE — 77336 RADIATION PHYSICS CONSULT: CPT | Performed by: RADIOLOGY

## 2024-01-15 PROCEDURE — 77014 CHG CT GUIDANCE RADIATION THERAPY FLDS PLACEMENT: CPT | Performed by: RADIOLOGY

## 2024-01-17 PROCEDURE — 77427 RADIATION TX MANAGEMENT X5: CPT | Performed by: RADIOLOGY

## 2024-01-17 PROCEDURE — 77014 CHG CT GUIDANCE RADIATION THERAPY FLDS PLACEMENT: CPT | Performed by: RADIOLOGY

## 2024-01-17 PROCEDURE — 77385 HB NTSTY MODUL RAD TX DLVR SMPL: CPT | Performed by: RADIOLOGY

## 2024-01-18 PROCEDURE — 77014 CHG CT GUIDANCE RADIATION THERAPY FLDS PLACEMENT: CPT | Performed by: RADIOLOGY

## 2024-01-18 PROCEDURE — 77385 HB NTSTY MODUL RAD TX DLVR SMPL: CPT | Performed by: RADIOLOGY

## 2024-01-19 PROCEDURE — 77014 CHG CT GUIDANCE RADIATION THERAPY FLDS PLACEMENT: CPT | Performed by: RADIOLOGY

## 2024-01-19 PROCEDURE — 77385 HB NTSTY MODUL RAD TX DLVR SMPL: CPT | Performed by: RADIOLOGY

## 2024-01-22 PROCEDURE — 77385 HB NTSTY MODUL RAD TX DLVR SMPL: CPT | Performed by: RADIOLOGY

## 2024-01-22 PROCEDURE — 77014 CHG CT GUIDANCE RADIATION THERAPY FLDS PLACEMENT: CPT | Performed by: RADIOLOGY

## 2024-01-23 PROCEDURE — 77014 CHG CT GUIDANCE RADIATION THERAPY FLDS PLACEMENT: CPT | Performed by: RADIOLOGY

## 2024-01-23 PROCEDURE — 77336 RADIATION PHYSICS CONSULT: CPT | Performed by: RADIOLOGY

## 2024-01-23 PROCEDURE — 77385 HB NTSTY MODUL RAD TX DLVR SMPL: CPT | Performed by: RADIOLOGY

## 2024-01-24 PROCEDURE — 77014 CHG CT GUIDANCE RADIATION THERAPY FLDS PLACEMENT: CPT | Performed by: RADIOLOGY

## 2024-01-24 PROCEDURE — 77385 HB NTSTY MODUL RAD TX DLVR SMPL: CPT | Performed by: RADIOLOGY

## 2024-01-24 PROCEDURE — 77427 RADIATION TX MANAGEMENT X5: CPT | Performed by: RADIOLOGY

## 2024-01-25 PROCEDURE — 77014 CHG CT GUIDANCE RADIATION THERAPY FLDS PLACEMENT: CPT | Performed by: RADIOLOGY

## 2024-01-25 PROCEDURE — 77385 HB NTSTY MODUL RAD TX DLVR SMPL: CPT | Performed by: RADIOLOGY

## 2024-01-26 PROCEDURE — 77385 HB NTSTY MODUL RAD TX DLVR SMPL: CPT | Performed by: RADIOLOGY

## 2024-01-26 PROCEDURE — 77014 CHG CT GUIDANCE RADIATION THERAPY FLDS PLACEMENT: CPT | Performed by: RADIOLOGY

## 2024-01-29 DIAGNOSIS — C61 PROSTATE CANCER (HCC): Primary | ICD-10-CM

## 2024-01-29 PROCEDURE — 77336 RADIATION PHYSICS CONSULT: CPT | Performed by: RADIOLOGY

## 2024-01-29 PROCEDURE — 77014 CHG CT GUIDANCE RADIATION THERAPY FLDS PLACEMENT: CPT | Performed by: RADIOLOGY

## 2024-01-29 PROCEDURE — 77385 HB NTSTY MODUL RAD TX DLVR SMPL: CPT | Performed by: RADIOLOGY

## 2024-01-29 RX ORDER — FLUOROURACIL 50 MG/G
CREAM TOPICAL
COMMUNITY
Start: 2023-12-21

## 2024-01-29 RX ORDER — NIRMATRELVIR AND RITONAVIR 300-100 MG
KIT ORAL
COMMUNITY
Start: 2023-12-26

## 2024-01-30 ENCOUNTER — OFFICE VISIT (OUTPATIENT)
Dept: OBGYN CLINIC | Facility: HOSPITAL | Age: 89
End: 2024-01-30
Payer: MEDICARE

## 2024-01-30 VITALS
BODY MASS INDEX: 19.9 KG/M2 | WEIGHT: 139 LBS | HEART RATE: 88 BPM | SYSTOLIC BLOOD PRESSURE: 116 MMHG | HEIGHT: 70 IN | DIASTOLIC BLOOD PRESSURE: 75 MMHG

## 2024-01-30 DIAGNOSIS — M25.561 CHRONIC PAIN OF BOTH KNEES: ICD-10-CM

## 2024-01-30 DIAGNOSIS — M25.562 CHRONIC PAIN OF BOTH KNEES: ICD-10-CM

## 2024-01-30 DIAGNOSIS — M17.0 PRIMARY OSTEOARTHRITIS OF BOTH KNEES: Primary | ICD-10-CM

## 2024-01-30 DIAGNOSIS — G89.29 CHRONIC PAIN OF BOTH KNEES: ICD-10-CM

## 2024-01-30 PROCEDURE — 20610 DRAIN/INJ JOINT/BURSA W/O US: CPT

## 2024-01-30 PROCEDURE — 99213 OFFICE O/P EST LOW 20 MIN: CPT

## 2024-01-30 RX ORDER — LIDOCAINE HYDROCHLORIDE 10 MG/ML
2 INJECTION, SOLUTION INFILTRATION; PERINEURAL
Status: COMPLETED | OUTPATIENT
Start: 2024-01-30 | End: 2024-01-30

## 2024-01-30 RX ORDER — BETAMETHASONE SODIUM PHOSPHATE AND BETAMETHASONE ACETATE 3; 3 MG/ML; MG/ML
12 INJECTION, SUSPENSION INTRA-ARTICULAR; INTRALESIONAL; INTRAMUSCULAR; SOFT TISSUE
Status: COMPLETED | OUTPATIENT
Start: 2024-01-30 | End: 2024-01-30

## 2024-01-30 RX ORDER — BUPIVACAINE HYDROCHLORIDE 2.5 MG/ML
2 INJECTION, SOLUTION INFILTRATION; PERINEURAL
Status: COMPLETED | OUTPATIENT
Start: 2024-01-30 | End: 2024-01-30

## 2024-01-30 RX ADMIN — LIDOCAINE HYDROCHLORIDE 2 ML: 10 INJECTION, SOLUTION INFILTRATION; PERINEURAL at 10:30

## 2024-01-30 RX ADMIN — BUPIVACAINE HYDROCHLORIDE 2 ML: 2.5 INJECTION, SOLUTION INFILTRATION; PERINEURAL at 10:30

## 2024-01-30 RX ADMIN — BETAMETHASONE SODIUM PHOSPHATE AND BETAMETHASONE ACETATE 12 MG: 3; 3 INJECTION, SUSPENSION INTRA-ARTICULAR; INTRALESIONAL; INTRAMUSCULAR; SOFT TISSUE at 10:30

## 2024-01-30 NOTE — PROGRESS NOTES
Assessment:   Diagnosis ICD-10-CM Associated Orders   1. Primary osteoarthritis of both knees  M17.0       2. Chronic pain of both knees  M25.561     M25.562     G89.29           Plan:  88 year old male with known bilateral knee osteoarthritis presents for repeat steroid injections to bilateral knees   - Continue exercise as tolerated   - Follow up in about 3 months for repeat injections     Diagnostics reviewed and physical exam performed.  Diagnosis, treatment options and associated risks were discussed with the patient including no treatment, nonsurgical treatment and potential for surgical intervention.  The patient was given the opportunity to ask questions regarding each.        To do next visit:  Follow up in 3 months for repeat injections     The above stated was discussed in layman's terms and the patient expressed understanding.  All questions were answered to the patient's satisfaction.         Subjective:   Bassam Tam is a 88 y.o. male who presents for 3 month follow up of bilateral knees.  Patient has known osteoarthritis of bilateral knees, and has been receiving steroid injections almost every 3 months.  He admits to doing well, bilateral knee steroid injections provided today.    Of note, patient finished 44 rounds of radiation yesterday!!    Pain score today 7/10.       Review of systems negative unless otherwise specified in HPI    Past Medical History:   Diagnosis Date    Abnormal loss of weight     last assessed: 4/24/13    Diverticulitis     Diverticulosis     Prostate cancer (HCC)     Skin cancer     Type 2 diabetes mellitus (HCC)        Past Surgical History:   Procedure Laterality Date    APPENDECTOMY      Age 16    CATARACT EXTRACTION      right 5/2002, left 12/2003    COLON SURGERY      for diverticulitis    COLONOSCOPY      done 5/5/2009, mild diverticulosis noted, recheck in 3 years    FL INJECTION LEFT HIP (NON ARTHROGRAM)  11/01/2022    FL INJECTION RIGHT HIP (NON ARTHROGRAM)   11/01/2022    INNER EAR SURGERY Left     Cochlear device implantation; resolved: Sep 2012    MOHS SURGERY Left     left wrist    MD PLMT INTERSTITIAL DEV RADIAT TX PROSTATE 1/MULT N/A 10/24/2023    Procedure: INSERTION OF FIDUCIAL MARKER , SPACEOAR;  Surgeon: Gus Solis MD;  Location: [facility];  Service: Urology    SHOULDER SURGERY  05/2007    right rotator cuff       Family History   Problem Relation Age of Onset    Coronary artery disease Sister     Diabetes Sister     Breast cancer Sister 70    Diabetes Daughter        Social History     Occupational History    Not on file   Tobacco Use    Smoking status: Never    Smokeless tobacco: Never   Vaping Use    Vaping status: Never Used   Substance and Sexual Activity    Alcohol use: Yes     Comment: social drinker as per [employer]    Drug use: No    Sexual activity: Yes         Current Outpatient Medications:     fluorouracil (EFUDEX) 5 % cream, APPLY TWICE A DAY FOR 4 WEEKS TO RIGHT LEG., Disp: , Rfl:     Paxlovid, 300/100, tablet therapy pack, TAKE 2 TABLETS OF NIRMATRELVIR ALONG WITH 1 TABLET OF RITONAVIR BY MOUTH TWICE DAILY FOR 5 DAYS, Disp: , Rfl:     levothyroxine 75 mcg tablet, TAKE 1 TABLET(75 MCG) BY MOUTH DAILY, Disp: 90 tablet, Rfl: 3    meclizine (ANTIVERT) 25 mg tablet, Take 1 tablet (25 mg total) by mouth every 8 (eight) hours as needed for dizziness, Disp: 30 tablet, Rfl: 0    tadalafil (CIALIS) 20 MG tablet, Take 1 tablet (20 mg total) by mouth daily as needed for erectile dysfunction, Disp: 10 tablet, Rfl: 5    tadalafil (CIALIS) 5 MG tablet, Take 1 tablet (5 mg total) by mouth in the morning, Disp: 30 tablet, Rfl: 6    No Known Allergies         Vitals:    01/30/24 1032   BP: 116/75   Pulse: 88       Objective:  Physical exam  General: Awake, Alert, Oriented  Eyes: Pupils equal, round and reactive to light  Heart: regular rate and rhythm  Lungs: No audible wheezing  Abdomen: soft                    Right Knee Exam     Tenderness   The patient  is experiencing tenderness in the lateral joint line and medial joint line.    Range of Motion   Extension:  normal   Flexion:  normal     Other   Erythema: absent  Scars: absent  Sensation: normal  Swelling: none  Effusion: no effusion present      Left Knee Exam     Tenderness   The patient is experiencing tenderness in the lateral joint line and medial joint line.    Range of Motion   Extension:  normal   Flexion:  normal     Other   Erythema: absent  Scars: absent  Sensation: normal  Swelling: none  Effusion: no effusion present            Diagnostics, reviewed and taken today if performed as documented:    None performed        Procedures, if performed today:    Large joint arthrocentesis: R knee  Universal Protocol:  Consent: Verbal consent obtained.  Risks and benefits: risks, benefits and alternatives were discussed  Consent given by: patient  Site marked: the operative site was marked  Supporting Documentation  Indications: pain   Procedure Details  Location: knee - R knee  Needle size: 22 G  Approach: anterolateral  Medications administered: 2 mL bupivacaine 0.25 %; 12 mg betamethasone acetate-betamethasone sodium phosphate 6 (3-3) mg/mL; 2 mL lidocaine 1 %    Patient tolerance: patient tolerated the procedure well with no immediate complications  Dressing:  Sterile dressing applied      Large joint arthrocentesis: L knee  Universal Protocol:  Consent: Verbal consent obtained.  Risks and benefits: risks, benefits and alternatives were discussed  Consent given by: patient  Site marked: the operative site was marked  Supporting Documentation  Indications: pain   Procedure Details  Location: knee - L knee  Needle size: 22 G  Approach: anterolateral  Medications administered: 2 mL bupivacaine 0.25 %; 12 mg betamethasone acetate-betamethasone sodium phosphate 6 (3-3) mg/mL; 2 mL lidocaine 1 %    Patient tolerance: patient tolerated the procedure well with no immediate complications  Dressing:  Sterile dressing  "applied           Portions of the record may have been created with voice recognition software.  Occasional wrong word or \"sound a like\" substitutions may have occurred due to the inherent limitations of voice recognition software.  Read the chart carefully and recognize, using context, where substitutions have occurred.      "

## 2024-02-12 ENCOUNTER — DOCUMENTATION (OUTPATIENT)
Dept: HEMATOLOGY ONCOLOGY | Facility: CLINIC | Age: 89
End: 2024-02-12

## 2024-02-12 ENCOUNTER — PATIENT OUTREACH (OUTPATIENT)
Dept: HEMATOLOGY ONCOLOGY | Facility: CLINIC | Age: 89
End: 2024-02-12

## 2024-02-12 NOTE — PROGRESS NOTES
End Of Treatment Assessment      Are you having any lingering side effects from your treatment?  - No  - Rash on neck    Are you interested in any support group information?  - No    Do you know who to call if you have any questions or concerns in the future?  - Yes    Are you aware of your upcoming Urology appointment with [facility] prior?  - To be scheduled     Is there anything else I can do for you?  - No      Outreach made to patient. We went over end of treatment assessment together. Patient wants to now if he can go back to his normal diet. Wife stated that Dr. Carter stated that he start introducing things back about 2 weeks post RT but to continue gas x to help with stomach pains. I reiterated to follow the doctors instructions. He voiced understanding. Patient has follow up on 3/12/24, I told patient to see how he is doing at that point and ask his question again. He does not have any further questions or concerns but knows I remain available if any arise.

## 2024-02-12 NOTE — PROGRESS NOTES
Patient completed RT on 1/29/24. Message sent to Urology clinical pool to schedule 3 month follow up post RT with [facility]. I will follow up on date in the near future.

## 2024-02-13 ENCOUNTER — DOCUMENTATION (OUTPATIENT)
Dept: HEMATOLOGY ONCOLOGY | Facility: CLINIC | Age: 89
End: 2024-02-13

## 2024-03-06 ENCOUNTER — APPOINTMENT (OUTPATIENT)
Dept: LAB | Facility: HOSPITAL | Age: 89
End: 2024-03-06
Payer: MEDICARE

## 2024-03-06 LAB — PSA SERPL-MCNC: <0.01 NG/ML (ref 0–4)

## 2024-03-12 ENCOUNTER — RADIATION ONCOLOGY FOLLOW-UP (OUTPATIENT)
Dept: RADIATION ONCOLOGY | Facility: CLINIC | Age: 89
End: 2024-03-12
Payer: MEDICARE

## 2024-03-12 VITALS
SYSTOLIC BLOOD PRESSURE: 113 MMHG | BODY MASS INDEX: 21.04 KG/M2 | DIASTOLIC BLOOD PRESSURE: 74 MMHG | WEIGHT: 146.61 LBS | RESPIRATION RATE: 18 BRPM | OXYGEN SATURATION: 97 % | TEMPERATURE: 97.2 F | HEART RATE: 83 BPM

## 2024-03-12 DIAGNOSIS — C61 PROSTATE CANCER (HCC): Primary | ICD-10-CM

## 2024-03-12 PROCEDURE — 99024 POSTOP FOLLOW-UP VISIT: CPT | Performed by: RADIOLOGY

## 2024-03-12 PROCEDURE — 99211 OFF/OP EST MAY X REQ PHY/QHP: CPT | Performed by: RADIOLOGY

## 2024-03-12 NOTE — PROGRESS NOTES
Problem: Adult Inpatient Plan of Care  Goal: Plan of Care Review  Outcome: Ongoing, Progressing  Goal: Patient-Specific Goal (Individualized)  Outcome: Ongoing, Progressing  Goal: Absence of Hospital-Acquired Illness or Injury  Outcome: Ongoing, Progressing  Goal: Optimal Comfort and Wellbeing  Outcome: Ongoing, Progressing  Goal: Readiness for Transition of Care  Outcome: Ongoing, Progressing     Problem: Diabetes Comorbidity  Goal: Blood Glucose Level Within Targeted Range  Outcome: Ongoing, Progressing     Problem: Device-Related Complication Risk (Hemodialysis)  Goal: Safe, Effective Therapy Delivery  Outcome: Ongoing, Progressing     Problem: Hemodynamic Instability (Hemodialysis)  Goal: Effective Tissue Perfusion  Outcome: Ongoing, Progressing     Problem: Infection (Hemodialysis)  Goal: Absence of Infection Signs and Symptoms  Outcome: Ongoing, Progressing     Problem: Anemia  Goal: Anemia Symptom Improvement  Outcome: Ongoing, Progressing      Bassam Tam 1935 is a 88 y.o. male With h/o [place] 7 (4+3) prostate cancer.  Completed RT to prostate on 1/29/24  Today's visit is an EOT follow-up   PSA   Latest Ref Rng 0.00 - 4.00 ng/mL   5/31/2023 31.01 (H)    6/14/2023 25.48 (H)    3/6/2024 <0.01                 Follow up visit     Oncology History Overview Note   With h/o [place] 7 (4+3) prostate cancer.  Completed RT to prostate on 1/29/24  Today's visit is an EOT follow-up         Prostate cancer (HCC)   7/31/2023 Initial Diagnosis    Prostate cancer (HCC)     7/31/2023 Biopsy    A. Prostate, right lateral base:  - Histologic Type: Acinar adenocarcinoma  - [place] score: 3 + 4 = 7  - Involvement: Involving 10% and 10% of  2 of 2 cores.  - Grade group: II  - Percentage of pattern 4: approximately 10%  - Perineural invasion: Absent     -  Multiplex immunohistochemical stain performed with appropriate controls shows malignant glands with loss of basal layer cells staining for p63 and high molecular weight keratin with luminal racemase expression, supporting the diagnosis.     B. Prostate, right medial base:  - Histologic Type: Acinar adenocarcinoma  - [place] score: 3 + 4 = 7  - Involvement: Involving 95% of 1 of 2 core.  - Grade group: II  - Percentage of pattern 4: 5-10%  - Perineural invasion: Absent      -  Multiplex immunohistochemical stain performed with appropriate controls on block B2 shows benign glands with intact basal layer cells staining for p63 and high molecular weight keratin with negative luminal racemase expression, supporting the diagnosis.     C. Prostate, right lateral mid:  - Histologic Type: Acinar adenocarcinoma  - [place] score: 3 + 4 = 7  - Involvement: Involving 90% of 1 of 1 core.  - Grade group: II  - Percentage of pattern 4: 15%  - Perineural invasion: Absent     D. Prostate, right medial mis:  - Histologic Type: Acinar adenocarcinoma  - [place] score: 3 + 4 = 7  - Involvement: Involving 60% of  1 of 1 core.  - Grade  group: II  - Percentage of pattern 4: 30%  - Perineural invasion: Absent     E. Prostate, right lateral apex:  - Histologic Type: Acinar adenocarcinoma  - [place] score: 3 + 4 = 7  - Involvement: Involving 95% of  1 of 1 core.  - Grade group: II  - Percentage of pattern 4: 15%  - Perineural invasion: Absent     F. Prostate, right medial apex:  - Histologic Type: Acinar adenocarcinoma  - [place] score: 3 + 4 = 7  - Involvement: Involving 50% of 1 of 1 core.  - Grade group: II  - Percentage of pattern 4: 25%  - Perineural invasion: Absent     G. Prostate, left lateral base:  - Benign prostatic tissue.  - Negative for malignancy.       -  Multiplex immunohistochemical stain performed with appropriate controls shows benign glands with intact basal layer cells staining for p63 and high molecular weight keratin with negative luminal racemase expression, supporting the diagnosis.     H. Prostate, left medial base:  - Benign prostatic tissue.  - Negative for malignancy.      -  Multiplex immunohistochemical stain performed with appropriate controls shows benign glands with intact basal layer cells staining for p63 and high molecular weight keratin with negative luminal racemase expression, supporting the diagnosis.     I. Prostate, left lateral mid:  - Histologic Type: Acinar adenocarcinoma  - [place] score: 3 + 3 = 6  - Involvement: Involving 20% of 1 of 1 core.  - Grade group: I  - Perineural invasion: Absent     J. Prostate, left medial mid:  - Benign prostatic tissue.  - Negative for malignancy.      -  Multiplex immunohistochemical stain performed with appropriate controls shows benign glands with intact basal layer cells staining for p63 and high molecular weight keratin with negative luminal racemase expression, supporting the diagnosis.     K. Prostate, left lateral apex:  - Histologic Type: Acinar adenocarcinoma  - [place] score: 4 + 3 = 7  - Involvement: Involving 95% discontinuously of  1 of 1 core.  - Grade  group: III  - Percentage of pattern 4: 90%  - Perineural invasion: Absent      L. Prostate, left medial apex :  - Histologic Type: Acinar adenocarcinoma  - [place] score: 4 + 3 = 7  - Involvement: Involving 90% discontinuously of  1 of 1 core.  - Grade group: III  - Percentage of pattern 4: 80%  - Perineural invasion: Absent      -  Multiplex immunohistochemical stain performed with appropriate controls shows malignant glands with loss of basal layer cells staining for p63 and high molecular weight keratin with luminal racemase expression, supporting the diagnosis.     9/6/2023 -  Cancer Staged    Staging form: Prostate, AJCC 8th Edition  - Clinical stage from 9/6/2023: Stage RUBY (cT1c, cN1, cM0, PSA: 31, Grade Group: 3) - Signed by Kilo Carter MD on 9/6/2023  Histopathologic type: Adenocarcinoma, NOS  Stage prefix: Initial diagnosis  Prostate specific antigen (PSA) range: 20 or greater  Parul primary pattern: 4  Parul secondary pattern: 3  [place] score: 7  Histologic grading system: 5 grade system  Location of positive needle core biopsies: Both sides       11/19/2023 - 1/29/2024 Radiation      Plan ID Energy Fractions Dose per Fraction (cGy) Dose Correction (cGy) Total Dose Delivered (cGy) Elapsed Days   CD Prost_pSV 6X-FFF 19 / 19 180 0 3,420 27   Whole Pel_SIB 6X-FFF 25 / 25 220 0 5,500 40      Treatment dates:  C1: 11/19/2023 - 1/29/2024         Review of Systems:  Review of Systems   Constitutional:  Positive for appetite change (increased).   HENT:  Positive for dental problem (dentures, possible implant).    Eyes:         Wear glasses   Respiratory: Negative.     Cardiovascular: Negative.    Gastrointestinal:  Negative for blood in stool, constipation and diarrhea.   Endocrine: Negative.    Genitourinary:  Positive for urgency.   Musculoskeletal:  Positive for arthralgias (legs, and feet).   Skin: Negative.    Allergic/Immunologic: Positive for environmental allergies (seasonal  allergies).   Neurological: Negative.    Hematological: Negative.    Psychiatric/Behavioral:  Positive for sleep disturbance.        Clinical Trial: no    IPSS Questionnaire (AUA-7):  Over the past month…    1)  How often have you had a sensation of not emptying your bladder completely after you finish urinating?  2 - Less than half the time   2)  How often have you had to urinate again less than two hours after you finished urinating? 3 - About half the time   3)  How often have you found you stopped and started again several times when you urinated?  0 - Not at all   4) How difficult have you found it to postpone urination?  0 - Not at all   5) How often have you had a weak urinary stream?  0 - Not at all   6) How often have you had to push or strain to begin urination?  0 - Not at all   7) How many times did you most typically get up to urinate from the time you went to bed until the time you got up in the morning?  3 - 3 times   Total Score:  8       Teaching NA    Health Maintenance   Topic Date Due    PT PLAN OF CARE  Never done    Depression Follow-up Plan  Never done    COVID-19 Vaccine (5 - 2023-24 season) 09/01/2023    Fall Risk  03/28/2024    Medicare Annual Wellness Visit (AWV)  03/28/2024    HEMOGLOBIN A1C  04/17/2024    Depression Screening  09/06/2024    Diabetic Foot Exam  10/17/2024    BMI: Adult  01/30/2025    DM Eye Exam  10/10/2025    Zoster Vaccine  Completed    Pneumococcal Vaccine: 65+ Years  Completed    Influenza Vaccine  Completed    HIB Vaccine  Aged Out    IPV Vaccine  Aged Out    Hepatitis A Vaccine  Aged Out    Meningococcal ACWY Vaccine  Aged Out    HPV Vaccine  Aged Out     Patient Active Problem List   Diagnosis    Hypothyroidism    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, without long-term current use of insulin (HCC)    Loss of hearing    Primary osteoarthritis of left hip    Primary osteoarthritis of both knees    Male erectile dysfunction, unspecified     Vertigo    Balance disorder    RBBB (right bundle branch block)    PVD (peripheral vascular disease) (HCC)    Pancreatic cyst    Left hip pain    Hammertoe    Seborrheic keratoses    Allergic rhinitis    Diabetes mellitus with peripheral vascular disease (HCC)    Onychomycosis    Nail hypertrophy    Stage 3a chronic kidney disease (HCC)    Callus    Mild protein-calorie malnutrition (HCC)    Chronic cough    Postnasal drip    Weight loss    Benign prostatic hyperplasia with nocturia    Hydronephrosis of left kidney    Prostate cancer (HCC)    Depression, recurrent (HCC)    Abnormal CT of the chest     Past Medical History:   Diagnosis Date    Abnormal loss of weight     last assessed: 4/24/13    Diverticulitis     Diverticulosis     Prostate cancer (HCC)     Skin cancer     Type 2 diabetes mellitus (HCC)      Past Surgical History:   Procedure Laterality Date    APPENDECTOMY      Age 16    CATARACT EXTRACTION      right 5/2002, left 12/2003    COLON SURGERY      for diverticulitis    COLONOSCOPY      done 5/5/2009, mild diverticulosis noted, recheck in 3 years    FL INJECTION LEFT HIP (NON ARTHROGRAM)  11/01/2022    FL INJECTION RIGHT HIP (NON ARTHROGRAM)  11/01/2022    INNER EAR SURGERY Left     Cochlear device implantation; resolved: Sep 2012    MOHS SURGERY Left     left wrist    TN PLMT INTERSTITIAL DEV RADIAT TX PROSTATE 1/MULT N/A 10/24/2023    Procedure: INSERTION OF FIDUCIAL MARKER , SPACEOAR;  Surgeon: Gus Solis MD;  Location: [facility];  Service: Urology    SHOULDER SURGERY  05/2007    right rotator cuff     Family History   Problem Relation Age of Onset    Coronary artery disease Sister     Diabetes Sister     Breast cancer Sister 70    Diabetes Daughter      Social History     Socioeconomic History    Marital status: /Civil Union     Spouse name: Not on file    Number of children: Not on file    Years of education: Not on file    Highest education level: Not on file   Occupational History     Not on file   Tobacco Use    Smoking status: Never    Smokeless tobacco: Never   Vaping Use    Vaping status: Never Used   Substance and Sexual Activity    Alcohol use: Yes     Comment: social drinker as per [employer]    Drug use: No    Sexual activity: Yes   Other Topics Concern    Not on file   Social History Narrative    Not on file     Social Determinants of Health     Financial Resource Strain: Low Risk  (3/28/2023)    Overall Financial Resource Strain (CARDIA)     Difficulty of Paying Living Expenses: Not hard at all   Food Insecurity: Not on file   Transportation Needs: No Transportation Needs (3/28/2023)    PRAPARE - Transportation     Lack of Transportation (Medical): No     Lack of Transportation (Non-Medical): No   Physical Activity: Not on file   Stress: Not on file   Social Connections: Not on file   Intimate Partner Violence: Not on file   Housing Stability: Not on file       Current Outpatient Medications:     fluorouracil (EFUDEX) 5 % cream, APPLY TWICE A DAY FOR 4 WEEKS TO RIGHT LEG., Disp: , Rfl:     levothyroxine 75 mcg tablet, TAKE 1 TABLET(75 MCG) BY MOUTH DAILY, Disp: 90 tablet, Rfl: 3    meclizine (ANTIVERT) 25 mg tablet, Take 1 tablet (25 mg total) by mouth every 8 (eight) hours as needed for dizziness, Disp: 30 tablet, Rfl: 0    Paxlovid, 300/100, tablet therapy pack, TAKE 2 TABLETS OF NIRMATRELVIR ALONG WITH 1 TABLET OF RITONAVIR BY MOUTH TWICE DAILY FOR 5 DAYS, Disp: , Rfl:     tadalafil (CIALIS) 20 MG tablet, Take 1 tablet (20 mg total) by mouth daily as needed for erectile dysfunction, Disp: 10 tablet, Rfl: 5    tadalafil (CIALIS) 5 MG tablet, Take 1 tablet (5 mg total) by mouth in the morning, Disp: 30 tablet, Rfl: 6  No Known Allergies  There were no vitals filed for this visit.

## 2024-03-12 NOTE — PROGRESS NOTES
Follow-up - Radiation Oncology   Bassam Tam 1935 88 y.o. male [id number]      History of Present Illness   Cancer Staging   Prostate cancer ([facility])  Staging form: Prostate, AJCC 8th Edition  - Clinical stage from 2023: Stage RUBY (cT1c, cN1, cM0, PSA: 31, Grade Group: 3) - Signed by Kilo Carter MD on 2023  Histopathologic type: Adenocarcinoma, NOS  Stage prefix: Initial diagnosis  Prostate specific antigen (PSA) range: 20 or greater  Parul primary pattern: 4  Parul secondary pattern: 3  [place] score: 7  Histologic grading system: 5 grade system  Location of positive needle core biopsies: Both sides      Bassam Tam is a 88 y.o. year old male with a history of [place] 7 (4+3) prostate cancer.  Completed RT to prostate on 24  Today's visit is an EOT follow-up    Interval History:    PSA   Latest Ref Rng 0.00 - 4.00 ng/mL   2023 31.01 (H)    2023 25.48 (H)    3/6/2024 <0.01       He is seen for follow-up today with his wife.  He is recovering well from radiation therapy.  He also had COVID infection during treatment.  He reports increase in his energy level.  He reports no pain and no dysuria.  He has no hematuria.  He has less nocturia now 2-3 times per night which is back to his baseline.  He is having no difficulty with diarrhea.  He has had 1 Lupron injection and reports no hot flashes.  He had lost about 10 pounds during treatment and is gained back 5 of those pounds.  He is taking 1 Ensure a day as well as 3 meals per day.    Upcomin2024 Dr. Mancuso.  2024 Urology.    Historical Information   Oncology History Overview Note   With h/o Parul 7 (4+3) prostate cancer.  Completed RT to prostate on 24  Today's visit is an EOT follow-up         Prostate cancer ([facility])   2023 Initial Diagnosis    Prostate cancer ([facility])     2023 Biopsy    A. Prostate, right lateral base:  - Histologic Type: Acinar adenocarcinoma  - Parul score: 3 + 4 = 7  -  Involvement: Involving 10% and 10% of  2 of 2 cores.  - Grade group: II  - Percentage of pattern 4: approximately 10%  - Perineural invasion: Absent     -  Multiplex immunohistochemical stain performed with appropriate controls shows malignant glands with loss of basal layer cells staining for p63 and high molecular weight keratin with luminal racemase expression, supporting the diagnosis.     B. Prostate, right medial base:  - Histologic Type: Acinar adenocarcinoma  - [place] score: 3 + 4 = 7  - Involvement: Involving 95% of 1 of 2 core.  - Grade group: II  - Percentage of pattern 4: 5-10%  - Perineural invasion: Absent      -  Multiplex immunohistochemical stain performed with appropriate controls on block B2 shows benign glands with intact basal layer cells staining for p63 and high molecular weight keratin with negative luminal racemase expression, supporting the diagnosis.     C. Prostate, right lateral mid:  - Histologic Type: Acinar adenocarcinoma  - [place] score: 3 + 4 = 7  - Involvement: Involving 90% of 1 of 1 core.  - Grade group: II  - Percentage of pattern 4: 15%  - Perineural invasion: Absent     D. Prostate, right medial mis:  - Histologic Type: Acinar adenocarcinoma  - Parul score: 3 + 4 = 7  - Involvement: Involving 60% of  1 of 1 core.  - Grade group: II  - Percentage of pattern 4: 30%  - Perineural invasion: Absent     E. Prostate, right lateral apex:  - Histologic Type: Acinar adenocarcinoma  - Parul score: 3 + 4 = 7  - Involvement: Involving 95% of  1 of 1 core.  - Grade group: II  - Percentage of pattern 4: 15%  - Perineural invasion: Absent     F. Prostate, right medial apex:  - Histologic Type: Acinar adenocarcinoma  - Parul score: 3 + 4 = 7  - Involvement: Involving 50% of 1 of 1 core.  - Grade group: II  - Percentage of pattern 4: 25%  - Perineural invasion: Absent     G. Prostate, left lateral base:  - Benign prostatic tissue.  - Negative for malignancy.       -  Multiplex  immunohistochemical stain performed with appropriate controls shows benign glands with intact basal layer cells staining for p63 and high molecular weight keratin with negative luminal racemase expression, supporting the diagnosis.     H. Prostate, left medial base:  - Benign prostatic tissue.  - Negative for malignancy.      -  Multiplex immunohistochemical stain performed with appropriate controls shows benign glands with intact basal layer cells staining for p63 and high molecular weight keratin with negative luminal racemase expression, supporting the diagnosis.     I. Prostate, left lateral mid:  - Histologic Type: Acinar adenocarcinoma  - [place] score: 3 + 3 = 6  - Involvement: Involving 20% of 1 of 1 core.  - Grade group: I  - Perineural invasion: Absent     J. Prostate, left medial mid:  - Benign prostatic tissue.  - Negative for malignancy.      -  Multiplex immunohistochemical stain performed with appropriate controls shows benign glands with intact basal layer cells staining for p63 and high molecular weight keratin with negative luminal racemase expression, supporting the diagnosis.     K. Prostate, left lateral apex:  - Histologic Type: Acinar adenocarcinoma  - Parul score: 4 + 3 = 7  - Involvement: Involving 95% discontinuously of  1 of 1 core.  - Grade group: III  - Percentage of pattern 4: 90%  - Perineural invasion: Absent      L. Prostate, left medial apex :  - Histologic Type: Acinar adenocarcinoma  - Parul score: 4 + 3 = 7  - Involvement: Involving 90% discontinuously of  1 of 1 core.  - Grade group: III  - Percentage of pattern 4: 80%  - Perineural invasion: Absent      -  Multiplex immunohistochemical stain performed with appropriate controls shows malignant glands with loss of basal layer cells staining for p63 and high molecular weight keratin with luminal racemase expression, supporting the diagnosis.     9/6/2023 -  Cancer Staged    Staging form: Prostate, AJCC 8th Edition  - Clinical  stage from 9/6/2023: Stage RUBY (cT1c, cN1, cM0, PSA: 31, Grade Group: 3) - Signed by Kilo Carter MD on 9/6/2023  Histopathologic type: Adenocarcinoma, NOS  Stage prefix: Initial diagnosis  Prostate specific antigen (PSA) range: 20 or greater  Parul primary pattern: 4  [place] secondary pattern: 3  [place] score: 7  Histologic grading system: 5 grade system  Location of positive needle core biopsies: Both sides       11/19/2023 - 1/29/2024 Radiation      Plan ID Energy Fractions Dose per Fraction (cGy) Dose Correction (cGy) Total Dose Delivered (cGy) Elapsed Days   CD Prost_pSV 6X-FFF 19 / 19 180 0 3,420 27   Whole Pel_SIB 6X-FFF 25 / 25 220 0 5,500 40      Treatment dates:  C1: 11/19/2023 - 1/29/2024         Past Medical History:   Diagnosis Date    Abnormal loss of weight     last assessed: 4/24/13    Diverticulitis     Diverticulosis     Prostate cancer (HCC)     Skin cancer     Type 2 diabetes mellitus (HCC)      Past Surgical History:   Procedure Laterality Date    APPENDECTOMY      Age 16    CATARACT EXTRACTION      right 5/2002, left 12/2003    COLON SURGERY      for diverticulitis    COLONOSCOPY      done 5/5/2009, mild diverticulosis noted, recheck in 3 years    FL INJECTION LEFT HIP (NON ARTHROGRAM)  11/01/2022    FL INJECTION RIGHT HIP (NON ARTHROGRAM)  11/01/2022    INNER EAR SURGERY Left     Cochlear device implantation; resolved: Sep 2012    MOHS SURGERY Left     left wrist    OK PLMT INTERSTITIAL DEV RADIAT TX PROSTATE 1/MULT N/A 10/24/2023    Procedure: INSERTION OF FIDUCIAL MARKER , SPACEOAR;  Surgeon: Gus Solis MD;  Location: [facility];  Service: Urology    SHOULDER SURGERY  05/2007    right rotator cuff       Social History   Social History     Substance and Sexual Activity   Alcohol Use Yes    Comment: social drinker as per [employer]     Social History     Substance and Sexual Activity   Drug Use No     Social History     Tobacco Use   Smoking Status Never   Smokeless  Tobacco Never     Meds/Allergies     Current Outpatient Medications:     levothyroxine 75 mcg tablet, TAKE 1 TABLET(75 MCG) BY MOUTH DAILY, Disp: 90 tablet, Rfl: 3    fluorouracil (EFUDEX) 5 % cream, APPLY TWICE A DAY FOR 4 WEEKS TO RIGHT LEG. (Patient not taking: Reported on 3/12/2024), Disp: , Rfl:     meclizine (ANTIVERT) 25 mg tablet, Take 1 tablet (25 mg total) by mouth every 8 (eight) hours as needed for dizziness (Patient not taking: Reported on 3/12/2024), Disp: 30 tablet, Rfl: 0    Paxlovid, 300/100, tablet therapy pack, TAKE 2 TABLETS OF NIRMATRELVIR ALONG WITH 1 TABLET OF RITONAVIR BY MOUTH TWICE DAILY FOR 5 DAYS (Patient not taking: Reported on 3/12/2024), Disp: , Rfl:     tadalafil (CIALIS) 20 MG tablet, Take 1 tablet (20 mg total) by mouth daily as needed for erectile dysfunction (Patient not taking: Reported on 3/12/2024), Disp: 10 tablet, Rfl: 5    tadalafil (CIALIS) 5 MG tablet, Take 1 tablet (5 mg total) by mouth in the morning (Patient not taking: Reported on 3/12/2024), Disp: 30 tablet, Rfl: 6  No Known Allergies    Review of Systems  Constitutional:  Positive for appetite change (increased).   HENT:  Positive for dental problem (dentures, possible implant).    Eyes:         Wear glasses   Respiratory: Negative.     Cardiovascular: Negative.    Gastrointestinal:  Negative for blood in stool, constipation and diarrhea.   Endocrine: Negative.    Genitourinary:  Positive for urgency.   Musculoskeletal:  Positive for arthralgias (legs, and feet).   Skin: Negative.    Allergic/Immunologic: Positive for environmental allergies (seasonal allergies).   Neurological: Negative.    Hematological: Negative.    Psychiatric/Behavioral:  Positive for sleep disturbance.       IPSS Questionnaire (AUA-7):  Over the past month…     1)  How often have you had a sensation of not emptying your bladder completely after you finish urinating?  2 - Less than half the time   2)  How often have you had to urinate again less  than two hours after you finished urinating? 3 - About half the time   3)  How often have you found you stopped and started again several times when you urinated?  0 - Not at all   4) How difficult have you found it to postpone urination?  0 - Not at all   5) How often have you had a weak urinary stream?  0 - Not at all   6) How often have you had to push or strain to begin urination?  0 - Not at all   7) How many times did you most typically get up to urinate from the time you went to bed until the time you got up in the morning?  3 - 3 times   Total Score:  8      OBJECTIVE:   /74   Pulse 83   Temp (!) 97.2 °F (36.2 °C) (Temporal)   Resp 18   Wt 66.5 kg (146 lb 9.7 oz)   SpO2 97%   BMI 21.04 kg/m²   Pain Assessment:  0  ECOG/Zubrod/WHO: 1 - Symptomatic but completely ambulatory    Physical Exam   Vitals and nursing note reviewed.   Constitutional:       General: He is not in acute distress.     Appearance: He is well-developed. He is not diaphoretic.   HENT:      Head: Normocephalic and atraumatic.      Mouth/Throat:      Pharynx: No oropharyngeal exudate.   Eyes:      General: No scleral icterus.     Conjunctiva/sclera: Conjunctivae normal.      Pupils: Pupils are equal, round, and reactive to light.   Neck:      Thyroid: No thyromegaly.      Trachea: No tracheal deviation.   Cardiovascular:      Rate and Rhythm: Normal rate and regular rhythm.      Heart sounds: Normal heart sounds.   Pulmonary:      Effort: Pulmonary effort is normal. No respiratory distress.      Breath sounds: Normal breath sounds. No stridor. No wheezing, rhonchi or rales.   Chest:      Chest wall: No tenderness.   Abdominal:      General: Abdomen is flat. Bowel sounds are normal. There is no distension.      Palpations: Abdomen is soft. There is no mass.      Tenderness: There is no abdominal tenderness.      Hernia: No hernia is present.   Genitourinary:     Comments: Rectal examination deferred.  Musculoskeletal:          General: No swelling or tenderness. Normal range of motion.      Cervical back: Normal range of motion and neck supple.   Lymphadenopathy:      Cervical: No cervical adenopathy.      Upper Body:      Right upper body: No supraclavicular adenopathy.      Left upper body: No supraclavicular adenopathy.      Lower Body: No right inguinal adenopathy. No left inguinal adenopathy.   Skin:     General: Skin is warm and dry.      Coloration: Skin is not jaundiced or pale.      Findings: No erythema or rash.   Neurological:      General: No focal deficit present.      Mental Status: He is alert and oriented to person, place, and time.      Cranial Nerves: No cranial nerve deficit.      Sensory: No sensory deficit.      Motor: No weakness.      Coordination: Coordination normal.   Psychiatric:         Behavior: Behavior normal.         Thought Content: Thought content normal.         Judgment: Judgment normal.     RESULTS    Lab Results:   Recent Results (from the past 672 hour(s))   PSA Total, Diagnostic    Collection Time: 03/06/24  7:53 AM   Result Value Ref Range    PSA, Diagnostic <0.01 0.00 - 4.00 ng/mL     Imaging Studies:No results found.    Assessment/Plan:  Orders Placed This Encounter   Procedures    PSA Total, Diagnostic        Bassam Tam is a 88 y.o. year old male with clinical stage RUBY, cT1c, cN1, M0 [place] score 4+3=7 prostate adenocarcinoma diagnosed after an elevated PSA of 31.01 May 31, 2023 that was repeated June 14, 2023 and was 25.48.  He reports this was his first PSA level.  He has a long history of BPH.  He had his first prostate biopsies on July 31, 2023 that revealed [place] score 4+3=7 disease in addition to [place] score 3+4=7 and 3+3=6 disease in multiple biopsies bilaterally. There were a total of 10 out of 12 biopsy cores that were positive.  Patient was seen by Dr. Ramirez and PSMA PET/CT was ordered and performed on September 1, 2023.  This revealed heterogeneous multifocal tracer  "activity involving the prostate gland consistent with malignancy.  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  We recommended radiation to the whole pelvis along with a boost to the lymph node and his prostate primary with a total of 9 weeks/44 fractions of radiation therapy.  We also recommended androgen deprivation therapy 2 to 3 months prior to the start of radiation therapy and he did have Lupron September 8, 2023.  He completed radiation therapy to a total dose of 7920 cGy along with 5500 cGy to the left pelvic lymph node on January 29, 2024.    He returns for follow-up examination today is recovering well from radiation therapy.  PSA level has gone down to less than 0.01 NG/mL on March 6, 2024 which indicates an excellent response to treatment.  This was discussed with the patient and his wife.  He has urology appointment scheduled for April 30, 2024.  We recommend he return here for follow-up in 6 months with a repeat PSA level.      Kilo Carter MD  3/12/2024,9:37 AM    Portions of the record may have been created with voice recognition software.  Occasional wrong word or \"sound a like\" substitutions may have occurred due to the inherent limitations of voice recognition software.  Read the chart carefully and recognize, using context, where substitutions have occurred.        "

## 2024-04-19 ENCOUNTER — LAB (OUTPATIENT)
Dept: LAB | Facility: HOSPITAL | Age: 89
End: 2024-04-19
Payer: MEDICARE

## 2024-04-19 DIAGNOSIS — E11.40 CONTROLLED TYPE 2 DIABETES MELLITUS WITH DIABETIC NEUROPATHY, WITHOUT LONG-TERM CURRENT USE OF INSULIN (HCC): ICD-10-CM

## 2024-04-19 DIAGNOSIS — E03.9 ACQUIRED HYPOTHYROIDISM: ICD-10-CM

## 2024-04-19 LAB
ALBUMIN SERPL BCP-MCNC: 3.9 G/DL (ref 3.5–5)
ALP SERPL-CCNC: 52 U/L (ref 34–104)
ALT SERPL W P-5'-P-CCNC: 11 U/L (ref 7–52)
ANION GAP SERPL CALCULATED.3IONS-SCNC: 7 MMOL/L (ref 4–13)
AST SERPL W P-5'-P-CCNC: 14 U/L (ref 13–39)
BILIRUB SERPL-MCNC: 0.67 MG/DL (ref 0.2–1)
BUN SERPL-MCNC: 16 MG/DL (ref 5–25)
CALCIUM SERPL-MCNC: 9.7 MG/DL (ref 8.4–10.2)
CHLORIDE SERPL-SCNC: 103 MMOL/L (ref 96–108)
CO2 SERPL-SCNC: 29 MMOL/L (ref 21–32)
CREAT SERPL-MCNC: 0.99 MG/DL (ref 0.6–1.3)
CREAT UR-MCNC: 51.3 MG/DL
ERYTHROCYTE [DISTWIDTH] IN BLOOD BY AUTOMATED COUNT: 11.6 % (ref 11.6–15.1)
EST. AVERAGE GLUCOSE BLD GHB EST-MCNC: 174 MG/DL
GFR SERPL CREATININE-BSD FRML MDRD: 67 ML/MIN/1.73SQ M
GLUCOSE P FAST SERPL-MCNC: 176 MG/DL (ref 65–99)
HBA1C MFR BLD: 7.7 %
HCT VFR BLD AUTO: 38.5 % (ref 36.5–49.3)
HGB BLD-MCNC: 13.1 G/DL (ref 12–17)
MCH RBC QN AUTO: 30.4 PG (ref 26.8–34.3)
MCHC RBC AUTO-ENTMCNC: 34 G/DL (ref 31.4–37.4)
MCV RBC AUTO: 89 FL (ref 82–98)
MICROALBUMIN UR-MCNC: <7 MG/L
MICROALBUMIN/CREAT 24H UR: <14 MG/G CREATININE (ref 0–30)
PLATELET # BLD AUTO: 181 THOUSANDS/UL (ref 149–390)
PMV BLD AUTO: 9.5 FL (ref 8.9–12.7)
POTASSIUM SERPL-SCNC: 4.2 MMOL/L (ref 3.5–5.3)
PROT SERPL-MCNC: 6.6 G/DL (ref 6.4–8.4)
RBC # BLD AUTO: 4.31 MILLION/UL (ref 3.88–5.62)
SODIUM SERPL-SCNC: 139 MMOL/L (ref 135–147)
TSH SERPL DL<=0.05 MIU/L-ACNC: 1.91 UIU/ML (ref 0.45–4.5)
WBC # BLD AUTO: 5.79 THOUSAND/UL (ref 4.31–10.16)

## 2024-04-19 PROCEDURE — 84443 ASSAY THYROID STIM HORMONE: CPT

## 2024-04-19 PROCEDURE — 80053 COMPREHEN METABOLIC PANEL: CPT

## 2024-04-19 PROCEDURE — 82043 UR ALBUMIN QUANTITATIVE: CPT

## 2024-04-19 PROCEDURE — 82570 ASSAY OF URINE CREATININE: CPT

## 2024-04-19 PROCEDURE — 83036 HEMOGLOBIN GLYCOSYLATED A1C: CPT

## 2024-04-19 PROCEDURE — 85027 COMPLETE CBC AUTOMATED: CPT

## 2024-04-19 PROCEDURE — 36415 COLL VENOUS BLD VENIPUNCTURE: CPT

## 2024-04-26 ENCOUNTER — HOSPITAL ENCOUNTER (OUTPATIENT)
Dept: CT IMAGING | Facility: HOSPITAL | Age: 89
Discharge: HOME | End: 2024-04-26
Payer: MEDICARE

## 2024-04-26 DIAGNOSIS — R93.89 ABNORMAL CT OF THE CHEST: ICD-10-CM

## 2024-04-26 PROCEDURE — 71260 CT THORAX DX C+: CPT

## 2024-04-26 PROCEDURE — G1004 CDSM NDSC: HCPCS

## 2024-04-26 RX ADMIN — IOHEXOL 85 ML: 350 INJECTION, SOLUTION INTRAVENOUS at 15:36

## 2024-04-30 ENCOUNTER — OFFICE VISIT (OUTPATIENT)
Dept: UROLOGY | Facility: CLINIC | Age: 89
End: 2024-04-30
Payer: MEDICARE

## 2024-04-30 ENCOUNTER — OFFICE VISIT (OUTPATIENT)
Dept: OBGYN CLINIC | Facility: HOSPITAL | Age: 89
End: 2024-04-30
Payer: MEDICARE

## 2024-04-30 VITALS — BODY MASS INDEX: 20.62 KG/M2 | WEIGHT: 144 LBS | HEIGHT: 70 IN

## 2024-04-30 VITALS
WEIGHT: 145 LBS | BODY MASS INDEX: 20.76 KG/M2 | HEIGHT: 70 IN | HEART RATE: 89 BPM | SYSTOLIC BLOOD PRESSURE: 116 MMHG | DIASTOLIC BLOOD PRESSURE: 70 MMHG | OXYGEN SATURATION: 98 %

## 2024-04-30 DIAGNOSIS — M17.0 PRIMARY OSTEOARTHRITIS OF BOTH KNEES: Primary | ICD-10-CM

## 2024-04-30 DIAGNOSIS — M25.561 CHRONIC PAIN OF BOTH KNEES: ICD-10-CM

## 2024-04-30 DIAGNOSIS — G89.29 CHRONIC PAIN OF BOTH KNEES: ICD-10-CM

## 2024-04-30 DIAGNOSIS — C61 PROSTATE CANCER (HCC): Primary | ICD-10-CM

## 2024-04-30 DIAGNOSIS — M25.562 CHRONIC PAIN OF BOTH KNEES: ICD-10-CM

## 2024-04-30 PROCEDURE — 99213 OFFICE O/P EST LOW 20 MIN: CPT | Performed by: ORTHOPAEDIC SURGERY

## 2024-04-30 PROCEDURE — 20610 DRAIN/INJ JOINT/BURSA W/O US: CPT

## 2024-04-30 PROCEDURE — 99213 OFFICE O/P EST LOW 20 MIN: CPT | Performed by: PHYSICIAN ASSISTANT

## 2024-04-30 RX ORDER — METHYLPREDNISOLONE ACETATE 40 MG/ML
1 INJECTION, SUSPENSION INTRA-ARTICULAR; INTRALESIONAL; INTRAMUSCULAR; SOFT TISSUE
Status: COMPLETED | OUTPATIENT
Start: 2024-04-30 | End: 2024-04-30

## 2024-04-30 RX ORDER — CHLORHEXIDINE GLUCONATE ORAL RINSE 1.2 MG/ML
SOLUTION DENTAL
COMMUNITY
Start: 2024-04-23

## 2024-04-30 RX ORDER — BUPIVACAINE HYDROCHLORIDE 2.5 MG/ML
2 INJECTION, SOLUTION INFILTRATION; PERINEURAL
Status: COMPLETED | OUTPATIENT
Start: 2024-04-30 | End: 2024-04-30

## 2024-04-30 RX ORDER — LIDOCAINE HYDROCHLORIDE 10 MG/ML
2 INJECTION, SOLUTION INFILTRATION; PERINEURAL
Status: COMPLETED | OUTPATIENT
Start: 2024-04-30 | End: 2024-04-30

## 2024-04-30 RX ORDER — BETAMETHASONE SODIUM PHOSPHATE AND BETAMETHASONE ACETATE 3; 3 MG/ML; MG/ML
6 INJECTION, SUSPENSION INTRA-ARTICULAR; INTRALESIONAL; INTRAMUSCULAR; SOFT TISSUE
Status: COMPLETED | OUTPATIENT
Start: 2024-04-30 | End: 2024-04-30

## 2024-04-30 RX ORDER — AMOXICILLIN 500 MG/1
CAPSULE ORAL
COMMUNITY
Start: 2024-04-23

## 2024-04-30 RX ADMIN — BETAMETHASONE SODIUM PHOSPHATE AND BETAMETHASONE ACETATE 6 MG: 3; 3 INJECTION, SUSPENSION INTRA-ARTICULAR; INTRALESIONAL; INTRAMUSCULAR; SOFT TISSUE at 09:45

## 2024-04-30 RX ADMIN — LIDOCAINE HYDROCHLORIDE 2 ML: 10 INJECTION, SOLUTION INFILTRATION; PERINEURAL at 09:45

## 2024-04-30 RX ADMIN — METHYLPREDNISOLONE ACETATE 1 ML: 40 INJECTION, SUSPENSION INTRA-ARTICULAR; INTRALESIONAL; INTRAMUSCULAR; SOFT TISSUE at 09:45

## 2024-04-30 RX ADMIN — BUPIVACAINE HYDROCHLORIDE 2 ML: 2.5 INJECTION, SOLUTION INFILTRATION; PERINEURAL at 09:45

## 2024-04-30 NOTE — PROGRESS NOTES
Assessment:   Diagnosis ICD-10-CM Associated Orders   1. Primary osteoarthritis of both knees  M17.0 Large joint arthrocentesis     Large joint arthrocentesis      2. Chronic pain of both knees  M25.561 Large joint arthrocentesis    M25.562 Large joint arthrocentesis    G89.29           Plan:  89 y.o. male with known osteoarthritis of bilateral knees   - Continued relief with intermittent CSI   - Repeat steroid injections provided to bilateral knees today   - Patient tolerated procedure well   - f/u in about 3 months      Diagnostics reviewed and physical exam performed.  Diagnosis, treatment options and associated risks were discussed with the patient including no treatment, nonsurgical treatment and potential for surgical intervention.  The patient was given the opportunity to ask questions regarding each.     The above stated was discussed in layman's terms and the patient expressed understanding.  All questions were answered to the patient's satisfaction.     To do next visit:  Return in about 3 months (around 7/30/2024) for 3 months for bilateral knees.      Subjective:   Bassam Tam is a 89 y.o. male who presents for follow up of bilateral knees.  Patient has known osteoarthritis of bilateral knees which has been treated in the past with intermittent steroid injections.  He last received injections 1/30/2024 which lasted about 2 months.  He was offered, accepted, and provided with steroid injections to bilateral knees today.   Pain score 7/10      Review of systems negative unless otherwise specified in HPI    Past Medical History:   Diagnosis Date    Abnormal loss of weight     last assessed: 4/24/13    Diverticulitis     Diverticulosis     Prostate cancer (HCC)     Skin cancer     Type 2 diabetes mellitus (HCC)        Past Surgical History:   Procedure Laterality Date    APPENDECTOMY      Age 16    CATARACT EXTRACTION      right 5/2002, left 12/2003    COLON SURGERY      for diverticulitis    COLONOSCOPY       done 5/5/2009, mild diverticulosis noted, recheck in 3 years    FL INJECTION LEFT HIP (NON ARTHROGRAM)  11/01/2022    FL INJECTION RIGHT HIP (NON ARTHROGRAM)  11/01/2022    INNER EAR SURGERY Left     Cochlear device implantation; resolved: Sep 2012    MOHS SURGERY Left     left wrist    [place] PLMT INTERSTITIAL DEV RADIAT TX PROSTATE 1/MULT N/A 10/24/2023    Procedure: INSERTION OF FIDUCIAL MARKER , SPACEOAR;  Surgeon: Gus Solis MD;  Location: [facility];  Service: Urology    SHOULDER SURGERY  05/2007    right rotator cuff       Family History   Problem Relation Age of Onset    Coronary artery disease Sister     Diabetes Sister     Breast cancer Sister 70    Diabetes Daughter        Social History     Occupational History    Not on file   Tobacco Use    Smoking status: Never    Smokeless tobacco: Never   Vaping Use    Vaping status: Never Used   Substance and Sexual Activity    Alcohol use: Yes     Comment: social drinker as per [employer]    Drug use: No    Sexual activity: Yes         Current Outpatient Medications:     amoxicillin (AMOXIL) 500 mg capsule, take 1 capsule by mouth every 8 hours until gone, Disp: , Rfl:     chlorhexidine (PERIDEX) 0.12 % solution, , Disp: , Rfl:     levothyroxine 75 mcg tablet, TAKE 1 TABLET(75 MCG) BY MOUTH DAILY, Disp: 90 tablet, Rfl: 3    meclizine (ANTIVERT) 25 mg tablet, Take 1 tablet (25 mg total) by mouth every 8 (eight) hours as needed for dizziness (Patient not taking: Reported on 3/12/2024), Disp: 30 tablet, Rfl: 0    tadalafil (CIALIS) 20 MG tablet, Take 1 tablet (20 mg total) by mouth daily as needed for erectile dysfunction (Patient not taking: Reported on 3/12/2024), Disp: 10 tablet, Rfl: 5    No Known Allergies       There were no vitals filed for this visit.    Objective:  Physical exam  General: Awake, Alert, Oriented  Eyes: Pupils equal, round and reactive to light  Heart: regular rate and rhythm  Lungs: No audible wheezing  Abdomen: soft                     Ortho Exam  Right knee:   No erythema or ecchymosis  No effusion or swelling  Normal strength  Good ROM with crepitus   Calf compartments soft and supple  Sensation intact  Toes are warm sensate and mobile     Left knee:   No erythema or ecchymosis  No effusion or swelling  Normal strength  Good ROM with crepitus   Calf compartments soft and supple  Sensation intact  Toes are warm sensate and mobile     Diagnostics, reviewed and taken today if performed as documented:    None performed        Procedures, if performed today:    Large joint arthrocentesis: R knee  Universal Protocol:  Consent: Verbal consent obtained.  Risks and benefits: risks, benefits and alternatives were discussed  Consent given by: patient  Site marked: the operative site was marked  Supporting Documentation  Indications: pain   Procedure Details  Location: knee - R knee  Needle size: 22 G  Approach: anterolateral  Medications administered: 2 mL bupivacaine 0.25 %; 2 mL lidocaine 1 %; 6 mg betamethasone acetate-betamethasone sodium phosphate 6 (3-3) mg/mL; 1 mL methylPREDNISolone acetate 40 mg/mL    Patient tolerance: patient tolerated the procedure well with no immediate complications  Dressing:  Sterile dressing applied      Large joint arthrocentesis: L knee  Universal Protocol:  Consent: Verbal consent obtained.  Risks and benefits: risks, benefits and alternatives were discussed  Consent given by: patient  Site marked: the operative site was marked  Supporting Documentation  Indications: pain   Procedure Details  Location: knee - L knee  Needle size: 22 G  Approach: anterolateral  Medications administered: 2 mL bupivacaine 0.25 %; 2 mL lidocaine 1 %; 6 mg betamethasone acetate-betamethasone sodium phosphate 6 (3-3) mg/mL; 1 mL methylPREDNISolone acetate 40 mg/mL    Patient tolerance: patient tolerated the procedure well with no immediate complications  Dressing:  Sterile dressing applied             Portions of the record may have been  "created with voice recognition software.  Occasional wrong word or \"sound a like\" substitutions may have occurred due to the inherent limitations of voice recognition software.  Read the chart carefully and recognize, using context, where substitutions have occurred.      "

## 2024-04-30 NOTE — ASSESSMENT & PLAN NOTE
- Hx of Parul 7 (4+3) prostate cancer.   -He did have one dose of Lupron on 9/08/23 and completed RT to prostate and left pelvic lymph node on 1/29/24. Most recent PSA <0.01 on 3/06/24. He is recovering well. His appetite has improved. He stills gets out to go golfing. We discussed forgoing with additional ADT for today. He did have some side effects with Lupron and states he does not want to have to get the injection anymore, which we discussed how I think this is a reasonable decision with his undetectable PSA.   -We will get a repeat PSA in 6 months and follow-up in the office.

## 2024-04-30 NOTE — PROGRESS NOTES
"4/30/2024      Chief Complaint   Patient presents with    Follow-up    Prostate Cancer     3 month f/u          Assessment and Plan    89 y.o. male managed by Dr. Ramirez.    1. Prostate cancer (HCC)  Assessment & Plan:  - Hx of Parul 7 (4+3) prostate cancer.   -He did have one dose of Lupron on 9/08/23 and completed RT to prostate and left pelvic lymph node on 1/29/24. Most recent PSA <0.01 on 3/06/24. He is recovering well. His appetite has improved. He stills gets out to go golfing. We discussed forgoing with additional ADT for today. He did have some side effects with Lupron and states he does not want to have to get the injection anymore, which we discussed how I think this is a reasonable decision with his undetectable PSA.   -We will get a repeat PSA in 6 months and follow-up in the office.     Orders:  -     PSA Total, Diagnostic; Future           History of Present Illness  Bassam Tam is a 89 y.o. male here for evaluation of a 3 month follow-up post RT for history of Parul 7 (4+3) prostate cancer. He is accompanied by his wife. He denies urinary symptoms.         Review of Systems   Constitutional:  Negative for activity change, fatigue and fever.   HENT:  Negative for congestion, rhinorrhea and sore throat.    Eyes:  Negative for photophobia, redness and visual disturbance.   Respiratory:  Negative for cough, shortness of breath and wheezing.    Cardiovascular:  Negative for chest pain, palpitations and leg swelling.   Gastrointestinal:  Negative for abdominal pain, diarrhea, nausea and vomiting.   Genitourinary:  Negative for dysuria, flank pain, frequency, hematuria and urgency.   Neurological:  Negative for weakness, light-headedness and headaches.                Vitals  Vitals:    04/30/24 1256   BP: 116/70   BP Location: Left arm   Patient Position: Sitting   Cuff Size: Adult   Pulse: 89   SpO2: 98%   Weight: 65.8 kg (145 lb)   Height: 5' 10\" (1.778 m)       Physical Exam  Constitutional:  "      Appearance: Normal appearance. He is not toxic-appearing.   HENT:      Head: Normocephalic.      Mouth/Throat:      Pharynx: Oropharynx is clear.   Eyes:      Extraocular Movements: Extraocular movements intact.      Pupils: Pupils are equal, round, and reactive to light.   Pulmonary:      Effort: Pulmonary effort is normal. No respiratory distress.   Musculoskeletal:         General: Normal range of motion.      Cervical back: Normal range of motion.   Neurological:      Mental Status: He is alert and oriented to person, place, and time. Mental status is at baseline.      Gait: Gait normal.   Psychiatric:         Mood and Affect: Mood normal.         Behavior: Behavior normal.         Thought Content: Thought content normal.         Judgment: Judgment normal.           Past History  Past Medical History:   Diagnosis Date    Abnormal loss of weight     last assessed: 4/24/13    Diverticulitis     Diverticulosis     Prostate cancer (HCC)     Skin cancer     Type 2 diabetes mellitus (HCC)      Social History     Socioeconomic History    Marital status: /Civil Union     Spouse name: None    Number of children: None    Years of education: None    Highest education level: None   Occupational History    None   Tobacco Use    Smoking status: Never    Smokeless tobacco: Never   Vaping Use    Vaping status: Never Used   Substance and Sexual Activity    Alcohol use: Yes     Comment: social drinker as per [employer]    Drug use: No    Sexual activity: Yes   Other Topics Concern    None   Social History Narrative    None     Social Determinants of Health     Financial Resource Strain: Low Risk  (3/28/2023)    Overall Financial Resource Strain (CARDIA)     Difficulty of Paying Living Expenses: Not hard at all   Food Insecurity: No Food Insecurity (4/29/2024)    Hunger Vital Sign     Worried About Running Out of Food in the Last Year: Never true     Ran Out of Food in the Last Year: Never true   Transportation Needs:  No Transportation Needs (4/29/2024)    PRAPARE - Transportation     Lack of Transportation (Medical): No     Lack of Transportation (Non-Medical): No   Physical Activity: Not on file   Stress: Not on file   Social Connections: Not on file   Intimate Partner Violence: Not on file   Housing Stability: Low Risk  (4/29/2024)    Housing Stability Vital Sign     Unable to Pay for Housing in the Last Year: No     Number of Places Lived in the Last Year: 1     Unstable Housing in the Last Year: No     Social History     Tobacco Use   Smoking Status Never   Smokeless Tobacco Never     Family History   Problem Relation Age of Onset    Coronary artery disease Sister     Diabetes Sister     Breast cancer Sister 70    Diabetes Daughter        The following portions of the patient's history were reviewed and updated as appropriate: allergies, current medications, past medical history, past social history, past surgical history and problem list.    Results  No results found for this or any previous visit (from the past 1 hour(s)).]  Lab Results   Component Value Date    PSA <0.01 03/06/2024    PSA 25.48 (H) 06/14/2023    PSA 31.01 (H) 05/31/2023     Lab Results   Component Value Date    GLUCOSE 136 08/14/2015    CALCIUM 9.7 04/19/2024     08/14/2015    K 4.2 04/19/2024    CO2 29 04/19/2024     04/19/2024    BUN 16 04/19/2024    CREATININE 0.99 04/19/2024     Lab Results   Component Value Date    WBC 5.79 04/19/2024    HGB 13.1 04/19/2024    HCT 38.5 04/19/2024    MCV 89 04/19/2024     04/19/2024       CINDY Galvez

## 2024-05-01 ENCOUNTER — OFFICE VISIT (OUTPATIENT)
Dept: FAMILY MEDICINE CLINIC | Facility: CLINIC | Age: 89
End: 2024-05-01
Payer: MEDICARE

## 2024-05-01 VITALS
WEIGHT: 145 LBS | RESPIRATION RATE: 16 BRPM | SYSTOLIC BLOOD PRESSURE: 110 MMHG | OXYGEN SATURATION: 96 % | DIASTOLIC BLOOD PRESSURE: 56 MMHG | HEIGHT: 70 IN | BODY MASS INDEX: 20.76 KG/M2 | HEART RATE: 89 BPM | TEMPERATURE: 97.7 F

## 2024-05-01 DIAGNOSIS — N52.9 ERECTILE DYSFUNCTION, UNSPECIFIED ERECTILE DYSFUNCTION TYPE: ICD-10-CM

## 2024-05-01 DIAGNOSIS — E03.9 ACQUIRED HYPOTHYROIDISM: ICD-10-CM

## 2024-05-01 DIAGNOSIS — C61 PROSTATE CANCER (HCC): ICD-10-CM

## 2024-05-01 DIAGNOSIS — Z00.00 MEDICARE ANNUAL WELLNESS VISIT, SUBSEQUENT: ICD-10-CM

## 2024-05-01 DIAGNOSIS — R93.89 ABNORMAL CT OF THE CHEST: ICD-10-CM

## 2024-05-01 DIAGNOSIS — R26.81 UNSTEADY GAIT: ICD-10-CM

## 2024-05-01 DIAGNOSIS — E11.40 CONTROLLED TYPE 2 DIABETES MELLITUS WITH DIABETIC NEUROPATHY, WITHOUT LONG-TERM CURRENT USE OF INSULIN (HCC): Primary | ICD-10-CM

## 2024-05-01 PROBLEM — F33.9 DEPRESSION, RECURRENT (HCC): Status: RESOLVED | Noted: 2023-10-17 | Resolved: 2024-05-01

## 2024-05-01 PROCEDURE — G0439 PPPS, SUBSEQ VISIT: HCPCS | Performed by: FAMILY MEDICINE

## 2024-05-01 PROCEDURE — 99214 OFFICE O/P EST MOD 30 MIN: CPT | Performed by: FAMILY MEDICINE

## 2024-05-01 PROCEDURE — 1123F ACP DISCUSS/DSCN MKR DOCD: CPT | Performed by: FAMILY MEDICINE

## 2024-05-01 RX ORDER — TADALAFIL 20 MG/1
20 TABLET ORAL DAILY PRN
Qty: 10 TABLET | Refills: 5 | Status: SHIPPED | OUTPATIENT
Start: 2024-05-01

## 2024-05-01 NOTE — PATIENT INSTRUCTIONS
Medicare Preventive Visit Patient Instructions  Thank you for completing your Welcome to Medicare Visit or Medicare Annual Wellness Visit today. Your next wellness visit will be due in one year (5/2/2025).  The screening/preventive services that you may require over the next 5-10 years are detailed below. Some tests may not apply to you based off risk factors and/or age. Screening tests ordered at today's visit but not completed yet may show as past due. Also, please note that scanned in results may not display below.  Preventive Screenings:  Service Recommendations Previous Testing/Comments   Colorectal Cancer Screening  Colonoscopy    Fecal Occult Blood Test (FOBT)/Fecal Immunochemical Test (FIT)  Fecal DNA/Cologuard Test  Flexible Sigmoidoscopy Age: 45-75 years old   Colonoscopy: every 10 years (May be performed more frequently if at higher risk)  OR  FOBT/FIT: every 1 year  OR  Cologuard: every 3 years  OR  Sigmoidoscopy: every 5 years  Screening may be recommended earlier than age 45 if at higher risk for colorectal cancer. Also, an individualized decision between you and your healthcare provider will decide whether screening between the ages of 76-85 would be appropriate. Colonoscopy: Not on file  FOBT/FIT: Not on file  Cologuard: Not on file  Sigmoidoscopy: Not on file    Screening Not Indicated     Prostate Cancer Screening Individualized decision between patient and health care provider in men between ages of 55-69   Medicare will cover every 12 months beginning on the day after your 50th birthday PSA: <0.01 ng/mL     History Prostate Cancer  Screening Not Indicated     Hepatitis C Screening Once for adults born between 1945 and 1965  More frequently in patients at high risk for Hepatitis C Hep C Antibody: Not on file        Diabetes Screening 1-2 times per year if you're at risk for diabetes or have pre-diabetes Fasting glucose: 176 mg/dL (4/19/2024)  A1C: 7.7 % (4/19/2024)  Screening Not  Indicated  History Diabetes   Cholesterol Screening Once every 5 years if you don't have a lipid disorder. May order more often based on risk factors. Lipid panel: 07/06/2020  Screening Current      Other Preventive Screenings Covered by Medicare:  Abdominal Aortic Aneurysm (AAA) Screening: covered once if your at risk. You're considered to be at risk if you have a family history of AAA or a male between the age of 65-75 who smoking at least 100 cigarettes in your lifetime.  Lung Cancer Screening: covers low dose CT scan once per year if you meet all of the following conditions: (1) Age 55-77; (2) No signs or symptoms of lung cancer; (3) Current smoker or have quit smoking within the last 15 years; (4) You have a tobacco smoking history of at least 20 pack years (packs per day x number of years you smoked); (5) You get a written order from a healthcare provider.  Glaucoma Screening: covered annually if you're considered high risk: (1) You have diabetes OR (2) Family history of glaucoma OR (3)  aged 50 and older OR (4)  American aged 65 and older  Osteoporosis Screening: covered every 2 years if you meet one of the following conditions: (1) Have a vertebral abnormality; (2) On glucocorticoid therapy for more than 3 months; (3) Have primary hyperparathyroidism; (4) On osteoporosis medications and need to assess response to drug therapy.  HIV Screening: covered annually if you're between the age of 15-65. Also covered annually if you are younger than 15 and older than 65 with risk factors for HIV infection. For pregnant patients, it is covered up to 3 times per pregnancy.    Immunizations:  Immunization Recommendations   Influenza Vaccine Annual influenza vaccination during flu season is recommended for all persons aged >= 6 months who do not have contraindications   Pneumococcal Vaccine   * Pneumococcal conjugate vaccine = PCV13 (Prevnar 13), PCV15 (Vaxneuvance), PCV20 (Prevnar 20)  *  Pneumococcal polysaccharide vaccine = PPSV23 (Pneumovax) Adults 19-63 yo with certain risk factors or if 65+ yo  If never received any pneumonia vaccine: recommend Prevnar 20 (PCV20)  Give PCV20 if previously received 1 dose of PCV13 or PPSV23   Hepatitis B Vaccine 3 dose series if at intermediate or high risk (ex: diabetes, end stage renal disease, liver disease)   Respiratory syncytial virus (RSV) Vaccine - COVERED BY MEDICARE PART D  * RSVPreF3 (Arexvy) CDC recommends that adults 60 years of age and older may receive a single dose of RSV vaccine using shared clinical decision-making (SCDM)   Tetanus (Td) Vaccine - COST NOT COVERED BY MEDICARE PART B Following completion of primary series, a booster dose should be given every 10 years to maintain immunity against tetanus. Td may also be given as tetanus wound prophylaxis.   Tdap Vaccine - COST NOT COVERED BY MEDICARE PART B Recommended at least once for all adults. For pregnant patients, recommended with each pregnancy.   Shingles Vaccine (Shingrix) - COST NOT COVERED BY MEDICARE PART B  2 shot series recommended in those 19 years and older who have or will have weakened immune systems or those 50 years and older     Health Maintenance Due:  There are no preventive care reminders to display for this patient.  Immunizations Due:      Topic Date Due   • COVID-19 Vaccine (5 - 2023-24 season) 09/01/2023     Advance Directives   What are advance directives?  Advance directives are legal documents that state your wishes and plans for medical care. These plans are made ahead of time in case you lose your ability to make decisions for yourself. Advance directives can apply to any medical decision, such as the treatments you want, and if you want to donate organs.   What are the types of advance directives?  There are many types of advance directives, and each state has rules about how to use them. You may choose a combination of any of the following:  Living will:  This  is a written record of the treatment you want. You can also choose which treatments you do not want, which to limit, and which to stop at a certain time. This includes surgery, medicine, IV fluid, and tube feedings.   Durable power of  for healthcare (DPAHC):  This is a written record that states who you want to make healthcare choices for you when you are unable to make them for yourself. This person, called a proxy, is usually a family member or a friend. You may choose more than 1 proxy.  Do not resuscitate (DNR) order:  A DNR order is used in case your heart stops beating or you stop breathing. It is a request not to have certain forms of treatment, such as CPR. A DNR order may be included in other types of advance directives.  Medical directive:  This covers the care that you want if you are in a coma, near death, or unable to make decisions for yourself. You can list the treatments you want for each condition. Treatment may include pain medicine, surgery, blood transfusions, dialysis, IV or tube feedings, and a ventilator (breathing machine).  Values history:  This document has questions about your views, beliefs, and how you feel and think about life. This information can help others choose the care that you would choose.  Why are advance directives important?  An advance directive helps you control your care. Although spoken wishes may be used, it is better to have your wishes written down. Spoken wishes can be misunderstood, or not followed. Treatments may be given even if you do not want them. An advance directive may make it easier for your family to make difficult choices about your care.       © Copyright [employer] 2018 Information is for End User's use only and may not be sold, redistributed or otherwise used for commercial purposes. All illustrations and images included in CareNotes® are the copyrighted property of [employer] or [employer]

## 2024-05-01 NOTE — ASSESSMENT & PLAN NOTE
Lab Results   Component Value Date    HGBA1C 7.7 (H) 04/19/2024     Pt would like to try harder on low carb diet. Refused medication now.

## 2024-05-01 NOTE — PROGRESS NOTES
Assessment and Plan:     Problem List Items Addressed This Visit          Endocrine    Hypothyroidism     4/2024 TSH normal. Continue levothyroxine 75mcg daily.          Relevant Orders    Comprehensive metabolic panel    Hemoglobin A1C    TSH, 3rd generation with Free T4 reflex    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, without long-term current use of insulin (HCC) - Primary       Lab Results   Component Value Date    HGBA1C 7.7 (H) 04/19/2024     Pt would like to try harder on low carb diet. Refused medication now.          Relevant Orders    Comprehensive metabolic panel    Hemoglobin A1C    TSH, 3rd generation with Free T4 reflex       Genitourinary    Prostate cancer (HCC)     [facility].             Care Coordination    Unsteady gait     Refer to [facility].          Relevant Orders    Ambulatory Referral to Physical Therapy       Other    Male erectile dysfunction, unspecified    Relevant Medications    tadalafil (CIALIS) 20 MG tablet    Abnormal CT of the chest     Had CT chest done on 4/26/2024. Wait for report.          Other Visit Diagnoses       Medicare annual wellness visit, subsequent                Depression Screening and Follow-up Plan: Patient was screened for depression during today's encounter. They screened negative with a PHQ-9 score of 3.      Reviewed lab in 4/2024  hgA1C 7.7  CBC normal  CMP ok, GFR 67  A/c <14  TSH normal    Flu shot yearly.   Got Covid19 shots and boosters.   Got PCV13 and pneumovax.   Got shingrix.   Got Tdap in 2023.   Fall precautions.   RTO in 6 months.     POA/living will ---done recently. DNR per pt.     Preventive health issues were discussed with patient, and age appropriate screening tests were ordered as noted in patient's After Visit Summary.  Personalized health advice and appropriate referrals for health education or preventive services given if needed, as noted in patient's After Visit Summary.     History of Present Illness:  "    Patient presents for a Medicare Wellness Visit    HPI     Pt is here with his wife.     Unsteady gait for a while.   Feels weakness of thighs from sitting to standing.   Wife would like him to see PT at [facility].     DM---4/2024 HgA1C 7.7 elevated. Diet control.   Denies hypoglycemia.   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 [facility] ophthalmologist regularly.   [facility] podiatry Dr. Mata in [place] Q 6 weeks.      Hypothyroidism---He is on levothyroxine 75mcg daily.      Prostate cancer---[facility] and hem/onc.   He got lupron and recent PSA <0.01 in 3/2024.   Will recheck PSA in 6 months.     Chronic cough for years.   Pt has postnasal drip. Does not use claritin and flonase because they affect his appetite.   Abnormal CT chest---9/1/2023 PET showed \"Nonspecific multifocal tracer avid patchy pulmonary opacities involving both lungs most consistent with an inflammatory process. Short interval follow-up with CT of chest in 3 months is recommended to ensure stability and exclude less likely underlying PSMA positive malignancy.\"  Pt did CT chest on 4/26/2024. Wait for report.        Live with wife. Does all ADL's. Plays golf/gardening regularly.   Denies recent falls.   Denies depression.       Patient Care Team:  Giovanni Mancuso MD as PCP - General (Family Medicine)  DO She Castillo (Nutrition)  Nichelle Fraser MA as Care Coordinator (Oncology)  Kilo Carter MD (Radiation Oncology)     Review of Systems:     Review of Systems   Constitutional:  Negative for appetite change, chills and fever.   HENT:  Negative for congestion, ear pain, sinus pain and sore throat.    Eyes:  Negative for discharge and itching.   Respiratory:  Negative for apnea, cough, chest tightness, shortness of breath and wheezing.    Cardiovascular:  Negative for chest pain, palpitations and leg swelling.   Gastrointestinal:  Negative for abdominal pain, anal bleeding, constipation, diarrhea, nausea and vomiting.   Endocrine: Negative for " cold intolerance, heat intolerance and polyuria.   Genitourinary:  Negative for difficulty urinating and dysuria.   Musculoskeletal:  Positive for gait problem. Negative for arthralgias, back pain and myalgias.   Skin:  Negative for rash.   Neurological:  Negative for dizziness and headaches.   Psychiatric/Behavioral:  Negative for agitation.         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, without long-term current use of insulin (HCC)    Loss of hearing    Primary osteoarthritis of left hip    Primary osteoarthritis of both knees    Male erectile dysfunction, unspecified    Vertigo    RBBB (right bundle branch block)    PVD (peripheral vascular disease) (HCC)    Pancreatic cyst    Left hip pain    Hammertoe    Seborrheic keratoses    Allergic rhinitis    Diabetes mellitus with peripheral vascular disease (HCC)    Onychomycosis    Nail hypertrophy    Stage 3a chronic kidney disease (HCC)    Callus    Mild protein-calorie malnutrition (HCC)    Chronic cough    Postnasal drip    Weight loss    Benign prostatic hyperplasia with nocturia    Hydronephrosis of left kidney    Prostate cancer (HCC)    Depression, recurrent (HCC)    Abnormal CT of the chest    Unsteady gait      Past Medical and Surgical History:     Past Medical History:   Diagnosis Date    Abnormal loss of weight     last assessed: 4/24/13    Diverticulitis     Diverticulosis     Prostate cancer (HCC)     Skin cancer     Type 2 diabetes mellitus (HCC)      Past Surgical History:   Procedure Laterality Date    APPENDECTOMY      Age 16    CATARACT EXTRACTION      right 5/2002, left 12/2003    COLON SURGERY      for diverticulitis    COLONOSCOPY      done 5/5/2009, mild diverticulosis noted, recheck in 3 years    FL INJECTION LEFT HIP (NON ARTHROGRAM)  11/01/2022    FL INJECTION RIGHT HIP (NON ARTHROGRAM)  11/01/2022    INNER EAR SURGERY Left     Cochlear  device implantation; resolved: Sep 2012    MOHS SURGERY Left     left wrist    ID PLMT INTERSTITIAL DEV RADIAT TX PROSTATE 1/MULT N/A 10/24/2023    Procedure: INSERTION OF FIDUCIAL MARKER , SPACEOAR;  Surgeon: Gus Solis MD;  Location: [facility];  Service: Urology    SHOULDER SURGERY  05/2007    right rotator cuff      Family History:     Family History   Problem Relation Age of Onset    Coronary artery disease Sister     Diabetes Sister     Breast cancer Sister 70    Diabetes Daughter       Social History:     Social History     Socioeconomic History    Marital status: /Civil Union     Spouse name: None    Number of children: None    Years of education: None    Highest education level: None   Occupational History    None   Tobacco Use    Smoking status: Never    Smokeless tobacco: Never   Vaping Use    Vaping status: Never Used   Substance and Sexual Activity    Alcohol use: Yes     Comment: social drinker as per [employer]    Drug use: No    Sexual activity: Yes   Other Topics Concern    None   Social History Narrative    None     Social Determinants of Health     Financial Resource Strain: Low Risk  (3/28/2023)    Overall Financial Resource Strain (CARDIA)     Difficulty of Paying Living Expenses: Not hard at all   Food Insecurity: No Food Insecurity (4/29/2024)    Hunger Vital Sign     Worried About Running Out of Food in the Last Year: Never true     Ran Out of Food in the Last Year: Never true   Transportation Needs: No Transportation Needs (4/29/2024)    PRAPARE - Transportation     Lack of Transportation (Medical): No     Lack of Transportation (Non-Medical): No   Physical Activity: Not on file   Stress: Not on file   Social Connections: Not on file   Intimate Partner Violence: Not on file   Housing Stability: Low Risk  (4/29/2024)    Housing Stability Vital Sign     Unable to Pay for Housing in the Last Year: No     Number of Places Lived in the Last Year: 1     Unstable Housing in the Last Year:  No      Medications and Allergies:     Current Outpatient Medications   Medication Sig Dispense Refill    levothyroxine 75 mcg tablet TAKE 1 TABLET(75 MCG) BY MOUTH DAILY 90 tablet 3    meclizine (ANTIVERT) 25 mg tablet Take 1 tablet (25 mg total) by mouth every 8 (eight) hours as needed for dizziness 30 tablet 0    tadalafil (CIALIS) 20 MG tablet Take 1 tablet (20 mg total) by mouth daily as needed for erectile dysfunction 10 tablet 5    amoxicillin (AMOXIL) 500 mg capsule take 1 capsule by mouth every 8 hours until gone (Patient not taking: Reported on 5/1/2024)      chlorhexidine (PERIDEX) 0.12 % solution  (Patient not taking: Reported on 5/1/2024)       No current facility-administered medications for this visit.     No Known Allergies   Immunizations:     Immunization History   Administered Date(s) Administered    COVID-19 MODERNA VACC 0.5 ML IM 01/25/2021, 03/01/2021, 11/05/2021    COVID-19 Moderna Vac BIVALENT 12 Yr+ IM 0.5 ML 11/14/2022    H1N1, All Formulations 01/31/2010    INFLUENZA 12/19/2019, 10/07/2020, 10/06/2021, 10/17/2023    Influenza Split High Dose Preservative Free IM 11/09/2015, 11/10/2016, 09/23/2017    Influenza, high dose seasonal 0.7 mL 10/18/2018, 12/19/2019, 10/07/2020, 10/06/2021, 10/17/2023    Influenza, seasonal, injectable 10/02/2012, 10/21/2013, 11/17/2014, 11/18/2014    Pneumococcal Conjugate 13-Valent 12/03/2013, 12/17/2014    Pneumococcal Polysaccharide PPV23 01/01/2005    Td (adult), adsorbed 09/19/2011    Tdap 05/20/2021, 05/15/2023    Zoster 11/06/2012    Zoster Vaccine Recombinant 12/10/2018, 06/13/2019    influenza, injectable, quadrivalent 11/14/2022      Health Maintenance:     There are no preventive care reminders to display for this patient.      Topic Date Due    COVID-19 Vaccine (5 - 2023-24 season) 09/01/2023      Medicare Screening Tests and Risk Assessments:     Bassam is here for his Subsequent Wellness visit.     Health Risk Assessment:   Patient rates overall  health as good. Patient feels that their physical health rating is same. Patient is satisfied with their life. Eyesight was rated as slightly worse. Hearing was rated as slightly worse. Patient feels that their emotional and mental health rating is same. Patients states they are sometimes angry. Patient states they are sometimes unusually tired/fatigued. Pain experienced in the last 7 days has been some. Patient's pain rating has been 5/10. Patient states that he has experienced weight loss or gain in last 6 months. Lost weight due to Radiation treatments for Prostate Cancer. Slowly gaining a little back.    Depression Screening:   PHQ-9 Score: 3      Fall Risk Screening:   In the past year, patient has experienced: no history of falling in past year      Home Safety:  Patient does not have trouble with stairs inside or outside of their home. Patient has working smoke alarms and has working carbon monoxide detector. Home safety hazards include: none.     Nutrition:   Current diet is Regular and Limited junk food.     Medications:   Patient is currently taking over-the-counter supplements. OTC medications include: see medication list. Patient is able to manage medications.     Activities of Daily Living (ADLs)/Instrumental Activities of Daily Living (IADLs):   Walk and transfer into and out of bed and chair?: Yes  Dress and groom yourself?: Yes    Bathe or shower yourself?: Yes    Feed yourself? Yes  Do your laundry/housekeeping?: Yes  Manage your money, pay your bills and track your expenses?: Yes  Make your own meals?: Yes    Do your own shopping?: Yes    Durable Medical Equipment Suppliers  N/A    Previous Hospitalizations:   Any hospitalizations or ED visits within the last 12 months?: No      Advance Care Planning:   Living will: Yes    Durable POA for healthcare: Yes    Advanced directive: Yes    End of Life Decisions reviewed with patient: Yes    Provider agrees with end of life decisions: Yes      Cognitive  "Screening:   Provider or family/friend/caregiver concerned regarding cognition?: No    PREVENTIVE SCREENINGS      Cardiovascular Screening:    General: Screening Current      Diabetes Screening:     General: Screening Not Indicated and History Diabetes      Colorectal Cancer Screening:     General: Screening Not Indicated      Prostate Cancer Screening:    General: History Prostate Cancer and Screening Not Indicated      Lung Cancer Screening:     General: Screening Not Indicated    Screening, Brief Intervention, and Referral to Treatment (SBIRT)    Screening  Typical number of drinks in a day: 0  Typical number of drinks in a week: 0  Interpretation: Low risk drinking behavior.    AUDIT-C Screenin) How often did you have a drink containing alcohol in the past year? monthly or less  2) How many drinks did you have on a typical day when you were drinking in the past year? 1 to 2  3) How often did you have 6 or more drinks on one occasion in the past year? never    AUDIT-C Score: 1  Interpretation: Score 0-3 (male): Negative screen for alcohol misuse    Single Item Drug Screening:  How often have you used an illegal drug (including marijuana) or a prescription medication for non-medical reasons in the past year? never    Single Item Drug Screen Score: 0  Interpretation: Negative screen for possible drug use disorder    No results found.     Physical Exam:     /56   Pulse 89   Temp 97.7 °F (36.5 °C) (Tympanic)   Resp 16   Ht 5' 10\" (1.778 m)   Wt 65.8 kg (145 lb)   SpO2 96%   BMI 20.81 kg/m²     Physical Exam  Vitals reviewed.   Constitutional:       Appearance: Normal appearance.   HENT:      Head: Normocephalic and atraumatic.   Eyes:      General:         Right eye: No discharge.         Left eye: No discharge.      Conjunctiva/sclera: Conjunctivae normal.   Neck:      Vascular: No carotid bruit.   Cardiovascular:      Rate and Rhythm: Normal rate and regular rhythm.      Heart sounds: Normal heart " sounds. No murmur heard.     No friction rub. No gallop.   Pulmonary:      Effort: Pulmonary effort is normal. No respiratory distress.      Breath sounds: Normal breath sounds. No wheezing or rales.   Abdominal:      General: Bowel sounds are normal. There is no distension.      Palpations: Abdomen is soft.      Tenderness: There is no abdominal tenderness.   Musculoskeletal:         General: No swelling, tenderness or deformity. Normal range of motion.      Cervical back: Normal range of motion and neck supple. No muscular tenderness.   Lymphadenopathy:      Cervical: No cervical adenopathy.   Neurological:      Mental Status: He is alert.   Psychiatric:         Mood and Affect: Mood normal.          Giovanni Mancuso MD

## 2024-05-06 ENCOUNTER — TELEPHONE (OUTPATIENT)
Age: 89
End: 2024-05-06

## 2024-05-06 DIAGNOSIS — R93.89 ABNORMAL CT OF THE CHEST: Primary | ICD-10-CM

## 2024-05-16 ENCOUNTER — CONSULT (OUTPATIENT)
Dept: PULMONOLOGY | Facility: CLINIC | Age: 89
End: 2024-05-16
Payer: MEDICARE

## 2024-05-16 VITALS
TEMPERATURE: 97.2 F | HEART RATE: 89 BPM | HEIGHT: 70 IN | DIASTOLIC BLOOD PRESSURE: 58 MMHG | BODY MASS INDEX: 20.03 KG/M2 | WEIGHT: 139.9 LBS | OXYGEN SATURATION: 98 % | SYSTOLIC BLOOD PRESSURE: 104 MMHG

## 2024-05-16 DIAGNOSIS — R93.89 ABNORMAL CT OF THE CHEST: ICD-10-CM

## 2024-05-16 DIAGNOSIS — J47.9 BRONCHIECTASIS WITHOUT COMPLICATION (HCC): Primary | ICD-10-CM

## 2024-05-16 PROCEDURE — 94010 BREATHING CAPACITY TEST: CPT | Performed by: INTERNAL MEDICINE

## 2024-05-16 PROCEDURE — 99204 OFFICE O/P NEW MOD 45 MIN: CPT | Performed by: INTERNAL MEDICINE

## 2024-05-16 RX ORDER — TIOTROPIUM BROMIDE AND OLODATEROL 3.124; 2.736 UG/1; UG/1
2 SPRAY, METERED RESPIRATORY (INHALATION) DAILY
Qty: 4 G | Refills: 5 | Status: SHIPPED | OUTPATIENT
Start: 2024-05-16

## 2024-05-16 NOTE — PATIENT INSTRUCTIONS
Take Stiolto 2 puffs in am followed by flutter valve 5 times  Use flutter throughout the day as needed  Follow up CT in October

## 2024-05-16 NOTE — PROGRESS NOTES
Ambulatory Visit  Name: Bassam Tam      : 1935      MRN: [id number]  Encounter Provider: Yaneth Cuba DO  Encounter Date: 2024   Encounter department: [facility] [place]    Assessment & Plan   1. 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  His spirometry showed some mild obstruction and given his symptoms of chronic cough I gave him Stiolto 2 puffs once a day followed by flutter valve 5 times for bronchial secretions.  He can continue to use the flutter valve throughout the day as needed.  With regards to the abnormal CAT scan I do think this is mucoid impaction which seems to wax and wane over the past few months.  Will check a follow-up CAT scan in 6 months.  Orders:  -     tiotropium-olodaterol (Stiolto Respimat) 2.5-2.5 MCG/ACT inhaler; Inhale 2 puffs daily  2. Abnormal CT of the chest  -     Ambulatory Referral to Pulmonology  -     POCT spirometry  -     CT chest without contrast; Future; Expected date: 10/09/2024      History of Present Illness     Bassam Tam is a 89 y.o. male who presents with an abnormal CAT scan.  Of note patient recently had prostate cancer and was found anecdotally to have some lung abnormalities.  He does complain of a chronic cough mostly in the morning he coughs up mucus but no significant shortness of breath.  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  He is an avid  and plays golf daily and lives with 1 dog at home.    Review of Systems   Constitutional: Negative.    HENT: Negative.     Eyes: Negative.    Respiratory:  Negative for shortness of breath.    Cardiovascular: Negative.    Gastrointestinal: Negative.    Endocrine: Negative.   "  Genitourinary: Negative.    Allergic/Immunologic: Negative.    Neurological: Negative.    Hematological: Negative.    Psychiatric/Behavioral: Negative.         Objective     /58   Pulse 89   Temp (!) 97.2 °F (36.2 °C) (Tympanic)   Ht 5' 10\" (1.778 m)   Wt 63.5 kg (139 lb 14.4 oz)   SpO2 98%   BMI 20.07 kg/m²     Physical Exam  Constitutional:       Appearance: He is well-developed.   HENT:      Head: Normocephalic and atraumatic.   Eyes:      Pupils: Pupils are equal, round, and reactive to light.   Cardiovascular:      Rate and Rhythm: Normal rate and regular rhythm.      Heart sounds: No murmur heard.  Pulmonary:      Effort: Pulmonary effort is normal. No respiratory distress.      Breath sounds: Normal breath sounds. No wheezing or rales.   Abdominal:      Palpations: Abdomen is soft.   Musculoskeletal:      Cervical back: Normal range of motion and neck supple.   Skin:     General: Skin is warm and dry.   Neurological:      Mental Status: He is alert and oriented to person, place, and time.       Administrative Statements           "

## 2024-05-16 NOTE — ASSESSMENT & PLAN NOTE
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  His spirometry showed some mild obstruction and given his symptoms of chronic cough I gave him Stiolto 2 puffs once a day followed by flutter valve 5 times for bronchial secretions.  He can continue to use the flutter valve throughout the day as needed.  With regards to the abnormal CAT scan I do think this is mucoid impaction which seems to wax and wane over the past few months.  Will check a follow-up CAT scan in 6 months.

## 2024-05-17 ENCOUNTER — TELEPHONE (OUTPATIENT)
Dept: PULMONOLOGY | Facility: CLINIC | Age: 89
End: 2024-05-17

## 2024-05-17 NOTE — TELEPHONE ENCOUNTER
PA for STIOLTO    Submitted via    []CMM-KEY   [x]Surescripts-Case ID # Case ID: [id number]      []Faxed to plan   []Other website   []Phone call Case ID #     Office notes sent, clinical questions answered. Awaiting determination    Turnaround time for your insurance to make a decision on your Prior Authorization can take 7-21 business days.

## 2024-05-21 NOTE — TELEPHONE ENCOUNTER
Patient's wife calling stating the Stiolto Respimat is $428.00 for one inhaler and that is with the insurance.    Patient's wife requesting another option

## 2024-05-22 ENCOUNTER — TELEPHONE (OUTPATIENT)
Dept: PULMONOLOGY | Facility: CLINIC | Age: 89
End: 2024-05-22

## 2024-05-22 NOTE — TELEPHONE ENCOUNTER
He could try Anoro, Bevespi or even Spiriva. Have he call her insurance to price check those options and let me know

## 2024-05-23 NOTE — TELEPHONE ENCOUNTER
Spouse called back and advised her to call insurance. Spouse to call and ask for pharmacy dept, request formulary alternative Tier 1 and Tier 2 medication for Anoro and Stiolto. Spouse will call us back with name of medication for us to prescription. Spouse acknowledged understanding of instructions and had no further questions at this time.

## 2024-05-28 ENCOUNTER — TELEPHONE (OUTPATIENT)
Age: 89
End: 2024-05-28

## 2024-05-28 DIAGNOSIS — J47.9 BRONCHIECTASIS WITHOUT COMPLICATION (HCC): Primary | ICD-10-CM

## 2024-05-28 NOTE — TELEPHONE ENCOUNTER
Incoming call:    Patient's spouse requesting Anoro instead of Stiolto due to cost (tier 3 vs 4 respectively).

## 2024-05-29 RX ORDER — UMECLIDINIUM BROMIDE AND VILANTEROL TRIFENATATE 62.5; 25 UG/1; UG/1
1 POWDER RESPIRATORY (INHALATION) DAILY
Qty: 60 BLISTER | Refills: 5 | Status: SHIPPED | OUTPATIENT
Start: 2024-05-29 | End: 2024-06-28

## 2024-07-29 RX ORDER — OXYCODONE HYDROCHLORIDE AND ACETAMINOPHEN 5; 325 MG/1; MG/1
1 TABLET ORAL EVERY 6 HOURS PRN
COMMUNITY
Start: 2024-05-24

## 2024-07-30 ENCOUNTER — OFFICE VISIT (OUTPATIENT)
Dept: OBGYN CLINIC | Facility: HOSPITAL | Age: 89
End: 2024-07-30
Payer: MEDICARE

## 2024-07-30 VITALS
SYSTOLIC BLOOD PRESSURE: 106 MMHG | WEIGHT: 140 LBS | HEART RATE: 91 BPM | HEIGHT: 70 IN | BODY MASS INDEX: 20.04 KG/M2 | DIASTOLIC BLOOD PRESSURE: 65 MMHG

## 2024-07-30 DIAGNOSIS — M25.561 CHRONIC PAIN OF BOTH KNEES: ICD-10-CM

## 2024-07-30 DIAGNOSIS — M17.0 PRIMARY OSTEOARTHRITIS OF BOTH KNEES: ICD-10-CM

## 2024-07-30 DIAGNOSIS — G89.29 CHRONIC PAIN OF BOTH KNEES: ICD-10-CM

## 2024-07-30 DIAGNOSIS — M25.562 CHRONIC PAIN OF BOTH KNEES: ICD-10-CM

## 2024-07-30 DIAGNOSIS — M70.62 TROCHANTERIC BURSITIS OF LEFT HIP: Primary | ICD-10-CM

## 2024-07-30 PROCEDURE — 99213 OFFICE O/P EST LOW 20 MIN: CPT | Performed by: ORTHOPAEDIC SURGERY

## 2024-07-30 PROCEDURE — 20610 DRAIN/INJ JOINT/BURSA W/O US: CPT | Performed by: ORTHOPAEDIC SURGERY

## 2024-07-30 RX ORDER — LIDOCAINE HYDROCHLORIDE 10 MG/ML
2 INJECTION, SOLUTION INFILTRATION; PERINEURAL
Status: COMPLETED | OUTPATIENT
Start: 2024-07-30 | End: 2024-07-30

## 2024-07-30 RX ORDER — BETAMETHASONE SODIUM PHOSPHATE AND BETAMETHASONE ACETATE 3; 3 MG/ML; MG/ML
12 INJECTION, SUSPENSION INTRA-ARTICULAR; INTRALESIONAL; INTRAMUSCULAR; SOFT TISSUE
Status: COMPLETED | OUTPATIENT
Start: 2024-07-30 | End: 2024-07-30

## 2024-07-30 RX ORDER — BUPIVACAINE HYDROCHLORIDE 2.5 MG/ML
2 INJECTION, SOLUTION INFILTRATION; PERINEURAL
Status: COMPLETED | OUTPATIENT
Start: 2024-07-30 | End: 2024-07-30

## 2024-07-30 RX ADMIN — LIDOCAINE HYDROCHLORIDE 2 ML: 10 INJECTION, SOLUTION INFILTRATION; PERINEURAL at 09:15

## 2024-07-30 RX ADMIN — BUPIVACAINE HYDROCHLORIDE 2 ML: 2.5 INJECTION, SOLUTION INFILTRATION; PERINEURAL at 09:15

## 2024-07-30 RX ADMIN — BETAMETHASONE SODIUM PHOSPHATE AND BETAMETHASONE ACETATE 12 MG: 3; 3 INJECTION, SUSPENSION INTRA-ARTICULAR; INTRALESIONAL; INTRAMUSCULAR; SOFT TISSUE at 09:15

## 2024-07-30 NOTE — PROGRESS NOTES
Assessment:  1. Trochanteric bursitis of left hip  Large joint arthrocentesis: L greater trochanteric bursa      2. Chronic pain of both knees  Large joint arthrocentesis: R knee    Large joint arthrocentesis: L knee      3. Primary osteoarthritis of both knees  Large joint arthrocentesis: R knee    Large joint arthrocentesis: L knee          Plan:  Bilateral knee osteoarthritis  Left trochanteric bursitis  The patient was provided with bilateral knee steroid injection (RCC) and left trochanteric bursa steroid inejction.  The patient tolerated the procedure well.    The patient should follow up in 3 months.      To do next visit:  Return in about 3 months (around 10/30/2024).    The above stated was discussed in layman's terms and the patient expressed understanding.  All questions were answered to the patient's satisfaction.       Scribe Attestation      I,:  Adam Mensah am acting as a scribe while in the presence of the attending physician.:       I,:  Sylvester Badillo MD personally performed the services described in this documentation    as scribed in my presence.:               Subjective:   Bassam Tam is a 89 y.o. male who presents for follow up of bilateral knees.  He is s/p bilateral knee steroid injections with about 6-8 weeks of benefit, 4/30/2024.  Today he complains of bilateral generalized knee pain and new onset of left lateral hip pain.  Prolonged walking aggravates while rest alleviates.  He does participate in physical therapy with benefit.        Review of systems negative unless otherwise specified in HPI    Past Medical History:   Diagnosis Date    Abnormal loss of weight     last assessed: 4/24/13    Diverticulitis     Diverticulosis     Prostate cancer (HCC)     Skin cancer     Type 2 diabetes mellitus (HCC)        Past Surgical History:   Procedure Laterality Date    APPENDECTOMY      Age 16    CATARACT EXTRACTION      right 5/2002, left 12/2003    COLON SURGERY      for  diverticulitis    COLONOSCOPY      done 5/5/2009, mild diverticulosis noted, recheck in 3 years    FL INJECTION LEFT HIP (NON ARTHROGRAM)  11/01/2022    FL INJECTION RIGHT HIP (NON ARTHROGRAM)  11/01/2022    INNER EAR SURGERY Left     Cochlear device implantation; resolved: Sep 2012    MOHS SURGERY Left     left wrist    OR PLMT INTERSTITIAL DEV RADIAT TX PROSTATE 1/MULT N/A 10/24/2023    Procedure: INSERTION OF FIDUCIAL MARKER , SPACEOAR;  Surgeon: Gus Solis MD;  Location: [facility];  Service: Urology    SHOULDER SURGERY  05/2007    right rotator cuff       Family History   Problem Relation Age of Onset    Coronary artery disease Sister     Diabetes Sister     Breast cancer Sister 70    Diabetes Daughter        Social History     Occupational History    Not on file   Tobacco Use    Smoking status: Never    Smokeless tobacco: Never   Vaping Use    Vaping status: Never Used   Substance and Sexual Activity    Alcohol use: Yes     Comment: social drinker as per [employer]    Drug use: No    Sexual activity: Yes         Current Outpatient Medications:     oxyCODONE-acetaminophen (PERCOCET) 5-325 mg per tablet, Take 1 tablet by mouth every 6 (six) hours as needed, Disp: , Rfl:     amoxicillin (AMOXIL) 500 mg capsule, take 1 capsule by mouth every 8 hours until gone (Patient not taking: Reported on 5/1/2024), Disp: , Rfl:     chlorhexidine (PERIDEX) 0.12 % solution, , Disp: , Rfl:     levothyroxine 75 mcg tablet, TAKE 1 TABLET(75 MCG) BY MOUTH DAILY, Disp: 90 tablet, Rfl: 3    meclizine (ANTIVERT) 25 mg tablet, Take 1 tablet (25 mg total) by mouth every 8 (eight) hours as needed for dizziness (Patient not taking: Reported on 5/16/2024), Disp: 30 tablet, Rfl: 0    tadalafil (CIALIS) 20 MG tablet, Take 1 tablet (20 mg total) by mouth daily as needed for erectile dysfunction (Patient not taking: Reported on 5/16/2024), Disp: 10 tablet, Rfl: 5    umeclidinium-vilanterol (Anoro Ellipta) 62.5-25 mcg/actuation inhaler,  "Inhale 1 puff daily, Disp: 60 blister, Rfl: 5    No Known Allergies         Vitals:    07/30/24 0913   BP: 106/65   Pulse: 91       Objective:  Physical exam  General: Awake, Alert, Oriented  Eyes: Pupils equal, round and reactive to light  Heart: regular rate and rhythm  Lungs: No audible wheezing  Abdomen: soft                    Ortho Exam  Bilateral knees:  No erythema or ecchymosis  No effusion or swelling  Normal strength  Good ROM with crepitus   Calf compartments soft and supple  Sensation intact  Toes are warm sensate and mobile    Left hip:  TTP over greater trochanter      Diagnostics, reviewed and taken today if performed as documented:    None performed     Procedures, if performed today:    Large joint arthrocentesis: R knee  Universal Protocol:  Consent: Verbal consent obtained.  Risks and benefits: risks, benefits and alternatives were discussed  Consent given by: patient  Time out: Immediately prior to procedure a \"time out\" was called to verify the correct patient, procedure, equipment, support staff and site/side marked as required.  Timeout called at: 7/30/2024 9:38 AM.  Patient understanding: patient states understanding of the procedure being performed  Site marked: the operative site was marked  Patient identity confirmed: verbally with patient  Supporting Documentation  Indications: pain   Procedure Details  Location: knee - R knee  Preparation: Patient was prepped and draped in the usual sterile fashion  Needle size: 22 G  Ultrasound guidance: no  Approach: anterolateral  Medications administered: 12 mg betamethasone acetate-betamethasone sodium phosphate 6 (3-3) mg/mL; 2 mL bupivacaine 0.25 %; 2 mL lidocaine 1 %    Patient tolerance: patient tolerated the procedure well with no immediate complications  Dressing:  Sterile dressing applied      Large joint arthrocentesis: L knee  Universal Protocol:  Consent: Verbal consent obtained.  Risks and benefits: risks, benefits and alternatives were " "discussed  Consent given by: patient  Time out: Immediately prior to procedure a \"time out\" was called to verify the correct patient, procedure, equipment, support staff and site/side marked as required.  Timeout called at: 7/30/2024 9:38 AM.  Patient understanding: patient states understanding of the procedure being performed  Site marked: the operative site was marked  Patient identity confirmed: verbally with patient  Supporting Documentation  Indications: pain   Procedure Details  Location: knee - L knee  Preparation: Patient was prepped and draped in the usual sterile fashion  Needle size: 22 G  Ultrasound guidance: no  Approach: anterolateral  Medications administered: 12 mg betamethasone acetate-betamethasone sodium phosphate 6 (3-3) mg/mL; 2 mL bupivacaine 0.25 %; 2 mL lidocaine 1 %    Patient tolerance: patient tolerated the procedure well with no immediate complications  Dressing:  Sterile dressing applied      Large joint arthrocentesis: L greater trochanteric bursa  Universal Protocol:  Consent: Verbal consent obtained.  Risks and benefits: risks, benefits and alternatives were discussed  Consent given by: patient  Time out: Immediately prior to procedure a \"time out\" was called to verify the correct patient, procedure, equipment, support staff and site/side marked as required.  Timeout called at: 7/30/2024 9:40 AM.  Patient understanding: patient states understanding of the procedure being performed  Site marked: the operative site was marked  Patient identity confirmed: verbally with patient  Supporting Documentation  Indications: pain   Procedure Details  Location: hip - L greater trochanteric bursa  Needle size: 22 G  Ultrasound guidance: no  Approach: lateral  Medications administered: 12 mg betamethasone acetate-betamethasone sodium phosphate 6 (3-3) mg/mL; 2 mL bupivacaine 0.25 %; 2 mL lidocaine 1 %    Patient tolerance: patient tolerated the procedure well with no immediate " "complications  Dressing:  Sterile dressing applied               Portions of the record may have been created with voice recognition software.  Occasional wrong word or \"sound a like\" substitutions may have occurred due to the inherent limitations of voice recognition software.  Read the chart carefully and recognize, using context, where substitutions have occurred.    "

## 2024-08-19 ENCOUNTER — TELEPHONE (OUTPATIENT)
Dept: UROLOGY | Facility: CLINIC | Age: 89
End: 2024-08-19

## 2024-08-19 NOTE — TELEPHONE ENCOUNTER
Spoke with wife Kristine and made her aware that 11/1/24 appointment at 8:20 is being canceled due to provider schedule change.  New appointment scheduled for 11/13/24 at 9:20.

## 2024-08-28 ENCOUNTER — APPOINTMENT (EMERGENCY)
Dept: CT IMAGING | Facility: HOSPITAL | Age: 89
End: 2024-08-28
Payer: MEDICARE

## 2024-08-28 ENCOUNTER — APPOINTMENT (EMERGENCY)
Dept: ULTRASOUND IMAGING | Facility: HOSPITAL | Age: 89
End: 2024-08-28
Payer: MEDICARE

## 2024-08-28 ENCOUNTER — HOSPITAL ENCOUNTER (EMERGENCY)
Facility: HOSPITAL | Age: 89
Discharge: HOME | End: 2024-08-28
Attending: EMERGENCY MEDICINE | Admitting: EMERGENCY MEDICINE
Payer: MEDICARE

## 2024-08-28 VITALS
TEMPERATURE: 97.7 F | HEART RATE: 94 BPM | SYSTOLIC BLOOD PRESSURE: 98 MMHG | HEIGHT: 70 IN | RESPIRATION RATE: 18 BRPM | WEIGHT: 136.47 LBS | OXYGEN SATURATION: 97 % | BODY MASS INDEX: 19.54 KG/M2 | DIASTOLIC BLOOD PRESSURE: 60 MMHG

## 2024-08-28 DIAGNOSIS — R19.7 DIARRHEA: ICD-10-CM

## 2024-08-28 DIAGNOSIS — R10.9 ACUTE ABDOMINAL PAIN: Primary | ICD-10-CM

## 2024-08-28 DIAGNOSIS — E86.0 DEHYDRATION: ICD-10-CM

## 2024-08-28 LAB
ALBUMIN SERPL BCG-MCNC: 3.8 G/DL (ref 3.5–5)
ALP SERPL-CCNC: 51 U/L (ref 34–104)
ALT SERPL W P-5'-P-CCNC: 12 U/L (ref 7–52)
ANION GAP SERPL CALCULATED.3IONS-SCNC: 9 MMOL/L (ref 4–13)
AST SERPL W P-5'-P-CCNC: 15 U/L (ref 13–39)
BASOPHILS # BLD AUTO: 0.01 THOUSANDS/ÂΜL (ref 0–0.1)
BASOPHILS NFR BLD AUTO: 0 % (ref 0–1)
BILIRUB SERPL-MCNC: 0.97 MG/DL (ref 0.2–1)
BILIRUB UR QL STRIP: NEGATIVE
BUN SERPL-MCNC: 18 MG/DL (ref 5–25)
CALCIUM SERPL-MCNC: 9.4 MG/DL (ref 8.4–10.2)
CHLORIDE SERPL-SCNC: 99 MMOL/L (ref 96–108)
CLARITY UR: CLEAR
CO2 SERPL-SCNC: 26 MMOL/L (ref 21–32)
COLOR UR: YELLOW
CREAT SERPL-MCNC: 1.02 MG/DL (ref 0.6–1.3)
EOSINOPHIL # BLD AUTO: 0.01 THOUSAND/ÂΜL (ref 0–0.61)
EOSINOPHIL NFR BLD AUTO: 0 % (ref 0–6)
ERYTHROCYTE [DISTWIDTH] IN BLOOD BY AUTOMATED COUNT: 12.6 % (ref 11.6–15.1)
GFR SERPL CREATININE-BSD FRML MDRD: 64 ML/MIN/1.73SQ M
GLUCOSE SERPL-MCNC: 180 MG/DL (ref 65–140)
GLUCOSE UR STRIP-MCNC: NEGATIVE MG/DL
HCT VFR BLD AUTO: 34.6 % (ref 36.5–49.3)
HGB BLD-MCNC: 11.8 G/DL (ref 12–17)
HGB UR QL STRIP.AUTO: NEGATIVE
IMM GRANULOCYTES # BLD AUTO: 0.03 THOUSAND/UL (ref 0–0.2)
IMM GRANULOCYTES NFR BLD AUTO: 0 % (ref 0–2)
KETONES UR STRIP-MCNC: ABNORMAL MG/DL
LEUKOCYTE ESTERASE UR QL STRIP: NEGATIVE
LIPASE SERPL-CCNC: 6 U/L (ref 11–82)
LYMPHOCYTES # BLD AUTO: 0.64 THOUSANDS/ÂΜL (ref 0.6–4.47)
LYMPHOCYTES NFR BLD AUTO: 10 % (ref 14–44)
MCH RBC QN AUTO: 30.3 PG (ref 26.8–34.3)
MCHC RBC AUTO-ENTMCNC: 34.1 G/DL (ref 31.4–37.4)
MCV RBC AUTO: 89 FL (ref 82–98)
MONOCYTES # BLD AUTO: 0.7 THOUSAND/ÂΜL (ref 0.17–1.22)
MONOCYTES NFR BLD AUTO: 10 % (ref 4–12)
NEUTROPHILS # BLD AUTO: 5.38 THOUSANDS/ÂΜL (ref 1.85–7.62)
NEUTS SEG NFR BLD AUTO: 80 % (ref 43–75)
NITRITE UR QL STRIP: NEGATIVE
NRBC BLD AUTO-RTO: 0 /100 WBCS
PH UR STRIP.AUTO: 5.5 [PH] (ref 4.5–8)
PLATELET # BLD AUTO: 172 THOUSANDS/UL (ref 149–390)
PMV BLD AUTO: 9.1 FL (ref 8.9–12.7)
POTASSIUM SERPL-SCNC: 3.7 MMOL/L (ref 3.5–5.3)
PROT SERPL-MCNC: 6.5 G/DL (ref 6.4–8.4)
PROT UR STRIP-MCNC: NEGATIVE MG/DL
RBC # BLD AUTO: 3.89 MILLION/UL (ref 3.88–5.62)
SODIUM SERPL-SCNC: 134 MMOL/L (ref 135–147)
SP GR UR STRIP.AUTO: 1.01 (ref 1–1.03)
UROBILINOGEN UR QL STRIP.AUTO: 0.2 E.U./DL
WBC # BLD AUTO: 6.77 THOUSAND/UL (ref 4.31–10.16)

## 2024-08-28 PROCEDURE — 36415 COLL VENOUS BLD VENIPUNCTURE: CPT | Performed by: EMERGENCY MEDICINE

## 2024-08-28 PROCEDURE — 83690 ASSAY OF LIPASE: CPT | Performed by: EMERGENCY MEDICINE

## 2024-08-28 PROCEDURE — 99285 EMERGENCY DEPT VISIT HI MDM: CPT | Performed by: EMERGENCY MEDICINE

## 2024-08-28 PROCEDURE — 96360 HYDRATION IV INFUSION INIT: CPT

## 2024-08-28 PROCEDURE — 76705 ECHO EXAM OF ABDOMEN: CPT

## 2024-08-28 PROCEDURE — 81003 URINALYSIS AUTO W/O SCOPE: CPT

## 2024-08-28 PROCEDURE — 99284 EMERGENCY DEPT VISIT MOD MDM: CPT

## 2024-08-28 PROCEDURE — 74177 CT ABD & PELVIS W/CONTRAST: CPT

## 2024-08-28 PROCEDURE — 96361 HYDRATE IV INFUSION ADD-ON: CPT

## 2024-08-28 PROCEDURE — 85025 COMPLETE CBC W/AUTO DIFF WBC: CPT | Performed by: EMERGENCY MEDICINE

## 2024-08-28 PROCEDURE — 80053 COMPREHEN METABOLIC PANEL: CPT | Performed by: EMERGENCY MEDICINE

## 2024-08-28 RX ORDER — HYOSCYAMINE SULFATE 0.125 MG
0.12 TABLET ORAL EVERY 4 HOURS PRN
Qty: 30 TABLET | Refills: 0 | Status: SHIPPED | OUTPATIENT
Start: 2024-08-28

## 2024-08-28 RX ADMIN — HYOSCYAMINE SULFATE 0.12 MG: 0.12 TABLET SUBLINGUAL at 10:32

## 2024-08-28 RX ADMIN — SODIUM CHLORIDE 1000 ML: 0.9 INJECTION, SOLUTION INTRAVENOUS at 09:59

## 2024-08-28 RX ADMIN — IOHEXOL 85 ML: 350 INJECTION, SOLUTION INTRAVENOUS at 10:42

## 2024-08-28 NOTE — ED PROVIDER NOTES
History  Chief Complaint   Patient presents with    Abdominal Pain     Pt c/o generalized abdominal pain with diarrhea for past couple of days. Pt seen at urgent care and told had GI bug. Pt reports not feeling any better and now feels weak.     No recent travel or antibiotics.  Patient was in urgent care on Monday with unremarkable labs, told to take Imodium.  Had negative COVID swab.      History provided by:  Patient  Abdominal Pain  Pain location:  LLQ, RLQ and suprapubic  Pain quality: gnawing    Pain radiates to:  Does not radiate  Pain severity:  Moderate  Onset quality:  Gradual  Duration:  3 days  Timing:  Constant  Progression:  Unchanged  Chronicity:  New  Relieved by:  Nothing  Worsened by:  Nothing  Ineffective treatments:  None tried  Associated symptoms: diarrhea (with out blood/mucus)    Associated symptoms: no chest pain, no chills, no constipation, no cough, no dysuria, no fatigue, no fever, no hematemesis, no hematochezia, no hematuria, no melena, no nausea, no shortness of breath, no sore throat and no vomiting        Prior to Admission Medications   Prescriptions Last Dose Informant Patient Reported? Taking?   amoxicillin (AMOXIL) 500 mg capsule Not Taking Spouse/Significant Other, Self Yes No   Sig: take 1 capsule by mouth every 8 hours until gone   Patient not taking: Reported on 5/1/2024   chlorhexidine (PERIDEX) 0.12 % solution Not Taking Spouse/Significant Other, Self Yes No   Patient not taking: Reported on 5/1/2024   levothyroxine 75 mcg tablet 8/27/2024 Self, Spouse/Significant Other No Yes   Sig: TAKE 1 TABLET(75 MCG) BY MOUTH DAILY   meclizine (ANTIVERT) 25 mg tablet Not Taking Self, Spouse/Significant Other No No   Sig: Take 1 tablet (25 mg total) by mouth every 8 (eight) hours as needed for dizziness   Patient not taking: Reported on 5/16/2024   oxyCODONE-acetaminophen (PERCOCET) 5-325 mg per tablet Not Taking  Yes No   Sig: Take 1 tablet by mouth every 6 (six) hours as needed    Patient not taking: Reported on 8/28/2024   tadalafil (CIALIS) 20 MG tablet Not Taking Self, Spouse/Significant Other No No   Sig: Take 1 tablet (20 mg total) by mouth daily as needed for erectile dysfunction   Patient not taking: Reported on 5/16/2024   umeclidinium-vilanterol (Anoro Ellipta) 62.5-25 mcg/actuation inhaler   No No   Sig: Inhale 1 puff daily      Facility-Administered Medications: None       Past Medical History:   Diagnosis Date    Abnormal loss of weight     last assessed: 4/24/13    Diverticulitis     Diverticulosis     Prostate cancer (HCC)     Skin cancer     Type 2 diabetes mellitus (HCC)        Past Surgical History:   Procedure Laterality Date    APPENDECTOMY      Age 16    CATARACT EXTRACTION      right 5/2002, left 12/2003    COLON SURGERY      for diverticulitis    COLONOSCOPY      done 5/5/2009, mild diverticulosis noted, recheck in 3 years    FL INJECTION LEFT HIP (NON ARTHROGRAM)  11/01/2022    FL INJECTION RIGHT HIP (NON ARTHROGRAM)  11/01/2022    INNER EAR SURGERY Left     Cochlear device implantation; resolved: Sep 2012    MOHS SURGERY Left     left wrist    [place] PLMT INTERSTITIAL DEV RADIAT TX PROSTATE 1/MULT N/A 10/24/2023    Procedure: INSERTION OF FIDUCIAL MARKER , SPACEOAR;  Surgeon: Gus Solis MD;  Location: [facility];  Service: Urology    SHOULDER SURGERY  05/2007    right rotator cuff       Family History   Problem Relation Age of Onset    Coronary artery disease Sister     Diabetes Sister     Breast cancer Sister 70    Diabetes Daughter      I have reviewed and agree with the history as documented.    E-Cigarette/Vaping    E-Cigarette Use Never User      E-Cigarette/Vaping Substances    Nicotine No     THC No     CBD No     Flavoring No     Other No     Unknown No      Social History     Tobacco Use    Smoking status: Never    Smokeless tobacco: Never   Vaping Use    Vaping status: Never Used   Substance Use Topics    Alcohol use: Yes     Comment: social drinker as per  [employer]    Drug use: No       Review of Systems   Constitutional:  Negative for chills, diaphoresis, fatigue and fever.   HENT:  Negative for congestion, ear pain, rhinorrhea, sinus pressure, sneezing, sore throat and trouble swallowing.    Eyes:  Negative for pain and visual disturbance.   Respiratory:  Negative for cough, chest tightness, shortness of breath, wheezing and stridor.    Cardiovascular:  Negative for chest pain, palpitations and leg swelling.   Gastrointestinal:  Positive for abdominal pain and diarrhea (with out blood/mucus). Negative for blood in stool, constipation, hematemesis, hematochezia, melena, nausea and vomiting.   Endocrine: Negative for polydipsia, polyphagia and polyuria.   Genitourinary:  Negative for decreased urine volume, dysuria, flank pain, frequency, hematuria and urgency.   Musculoskeletal:  Negative for arthralgias and myalgias.   Skin:  Negative for color change and rash.   Neurological:  Negative for dizziness, seizures, light-headedness, numbness and headaches.   Hematological:  Negative for adenopathy.   Psychiatric/Behavioral:  The patient is not nervous/anxious.        Physical Exam  Physical Exam  Constitutional:       Appearance: He is well-developed.   HENT:      Head: Normocephalic and atraumatic.   Eyes:      General: No scleral icterus.     Conjunctiva/sclera: Conjunctivae normal.   Neck:      Vascular: No JVD.      Trachea: No tracheal deviation.   Cardiovascular:      Rate and Rhythm: Normal rate and regular rhythm.   Pulmonary:      Effort: Pulmonary effort is normal. No respiratory distress.      Breath sounds: Normal breath sounds.   Abdominal:      General: There is no distension.      Tenderness: There is abdominal tenderness in the right lower quadrant, suprapubic area and left lower quadrant. There is no right CVA tenderness, left CVA tenderness, guarding or rebound.      Hernia: No hernia is present.   Musculoskeletal:         General: No deformity.  Normal range of motion.      Cervical back: Normal range of motion.   Skin:     Coloration: Skin is not pale.      Findings: No erythema or rash.   Neurological:      Mental Status: He is alert and oriented to person, place, and time.   Psychiatric:         Behavior: Behavior normal.         Vital Signs  ED Triage Vitals   Temperature Pulse Respirations Blood Pressure SpO2   08/28/24 0935 08/28/24 0933 08/28/24 0933 08/28/24 0933 08/28/24 0933   97.7 °F (36.5 °C) 101 18 123/73 98 %      Temp Source Heart Rate Source Patient Position - Orthostatic VS BP Location FiO2 (%)   08/28/24 0935 08/28/24 0933 08/28/24 0933 08/28/24 0933 --   Oral Monitor Sitting Right arm       Pain Score       --                  Vitals:    08/28/24 0933 08/28/24 1200 08/28/24 1419   BP: 123/73 113/62 98/60   Pulse: 101 84 94   Patient Position - Orthostatic VS: Sitting Sitting Sitting         Visual Acuity      ED Medications  Medications   sodium chloride 0.9 % bolus 1,000 mL (0 mL Intravenous Stopped 8/28/24 1227)   hyoscyamine (LEVSIN/SL) SL tablet 0.125 mg (0.125 mg Sublingual Given 8/28/24 1032)   iohexol (OMNIPAQUE) 350 MG/ML injection (SINGLE-DOSE) 85 mL (85 mL Intravenous Given 8/28/24 1042)       Diagnostic Studies  Results Reviewed       Procedure Component Value Units Date/Time    Urine Macroscopic, POC [[id number]]  (Abnormal) Collected: 08/28/24 1430    Lab Status: Final result Specimen: Urine Updated: 08/28/24 1431     Color, UA Yellow     Clarity, UA Clear     pH, UA 5.5     Leukocytes, UA Negative     Nitrite, UA Negative     Protein, UA Negative mg/dl      Glucose, UA Negative mg/dl      Ketones, UA Trace mg/dl      Urobilinogen, UA 0.2 E.U./dl      Bilirubin, UA Negative     Occult Blood, UA Negative     Specific Gravity, UA 1.010    Narrative:      CLINITEK RESULT    Lipase [[id number]]  (Abnormal) Collected: 08/28/24 0956    Lab Status: Final result Specimen: Blood from Arm, Right Updated: 08/28/24 1029     Lipase 6 u/L      Comprehensive metabolic panel [[id number]]  (Abnormal) Collected: 08/28/24 0956    Lab Status: Final result Specimen: Blood from Arm, Right Updated: 08/28/24 1027     Sodium 134 mmol/L      Potassium 3.7 mmol/L      Chloride 99 mmol/L      CO2 26 mmol/L      ANION GAP 9 mmol/L      BUN 18 mg/dL      Creatinine 1.02 mg/dL      Glucose 180 mg/dL      Calcium 9.4 mg/dL      AST 15 U/L      ALT 12 U/L      Alkaline Phosphatase 51 U/L      Total Protein 6.5 g/dL      Albumin 3.8 g/dL      Total Bilirubin 0.97 mg/dL      eGFR 64 ml/min/1.73sq m     Narrative:      National Kid[employer] guidelines for Chronic Kidney Disease (CKD):     Stage 1 with normal or high GFR (GFR > 90 mL/min/1.73 square meters)    Stage 2 Mild CKD (GFR = 60-89 mL/min/1.73 square meters)    Stage 3A Moderate CKD (GFR = 45-59 mL/min/1.73 square meters)    Stage 3B Moderate CKD (GFR = 30-44 mL/min/1.73 square meters)    Stage 4 Severe CKD (GFR = 15-29 mL/min/1.73 square meters)    Stage 5 End Stage CKD (GFR <15 mL/min/1.73 square meters)  Note: GFR calculation is accurate only with a steady state creatinine    CBC and differential [[id number]]  (Abnormal) Collected: 08/28/24 0956    Lab Status: Final result Specimen: Blood from Arm, Right Updated: 08/28/24 1002     WBC 6.77 Thousand/uL      RBC 3.89 Million/uL      Hemoglobin 11.8 g/dL      Hematocrit 34.6 %      MCV 89 fL      MCH 30.3 pg      MCHC 34.1 g/dL      RDW 12.6 %      MPV 9.1 fL      Platelets 172 Thousands/uL      nRBC 0 /100 WBCs      Segmented % 80 %      Immature Grans % 0 %      Lymphocytes % 10 %      Monocytes % 10 %      Eosinophils Relative 0 %      Basophils Relative 0 %      Absolute Neutrophils 5.38 Thousands/µL      Absolute Immature Grans 0.03 Thousand/uL      Absolute Lymphocytes 0.64 Thousands/µL      Absolute Monocytes 0.70 Thousand/µL      Eosinophils Absolute 0.01 Thousand/µL      Basophils Absolute 0.01 Thousands/µL                    [facility]  quadrant   Final Result by Abe Mcgill MD (08/28 1416)      No findings to suggest acute cholecystitis or other acute abnormalities. Suspect that pericholecystic fluid seen on recent CT was secondary to colonic inflammation better seen on CT.      Workstation performed: [id number]         CT abdomen pelvis with contrast   Final Result by Jerome Valencia MD (08/28 1319)      1. Distended gallbladder with trace pericholecystic fluid and fat stranding. Acute cholecystitis is possible. Recommend gallbladder ultrasound.      2. Mild diffuse colonic wall thickening may be due to colitis.      3. Possible mild urothelial thickening of the right renal pelvis and proximal ureter which could be infectious/inflammatory. Mild diffuse bladder wall thickening may be related to chronic outlet obstruction although cystitis is also possible. Recommend    correlation with urinalysis.         The study was marked in EPIC for immediate notification.                        Resident: Alfonso Shore I, the attending radiologist, have reviewed the images and agree with the final report above.      Workstation performed: [id number]                    Procedures  Procedures         ED Course  ED Course as of 08/28/24 1438   Wed Aug 28, 2024   1434 Medical workup is reviewed and consistent with enteritis.  Patient symptoms improved with Levsin.  Will reassure, , discharged to home on Levsin.                                 SBIRT 20yo+      Flowsheet Row Most Recent Value   Initial Alcohol Screen: US AUDIT-C     1. How often do you have a drink containing alcohol? 0 Filed at: 08/28/2024 0934   2. How many drinks containing alcohol do you have on a typical day you are drinking?  0 Filed at: 08/28/2024 0934   3b. FEMALE Any Age, or MALE 65+: How often do you have 4 or more drinks on one occassion? 0 Filed at: 08/28/2024 0934   Audit-C Score 0 Filed at: 08/28/2024 0934   MIKEL: How many times in the past year have  you...    Used an illegal drug or used a prescription medication for non-medical reasons? Never Filed at: 08/28/2024 0934                      Medical Decision Making  Acute abdominal pain and diarrhea- will check abd labs to r/o hepatitis/pancreatitis, lyte abnormality, ct a/p to r/o acute surgical pathology such as but not limited to peforated viscus, diverticulitis, ivfs for dehydration    Amount and/or Complexity of Data Reviewed  Labs: ordered.  Radiology: ordered.    Risk  Prescription drug management.                 Disposition  Final diagnoses:   Acute abdominal pain   Diarrhea   Dehydration     Time reflects when diagnosis was documented in both MDM as applicable and the Disposition within this note       Time User Action Codes Description Comment    8/28/2024  2:36 PM Tanner Rocha [R10.9] Acute abdominal pain     8/28/2024  2:36 PM Tanner Rocha [R19.7] Diarrhea     8/28/2024  2:36 PM Tanner Rocha Add [E86.0] Dehydration           ED Disposition       ED Disposition   Discharge    Condition   Stable    Date/Time   Wed Aug 28, 2024 1436    Comment   Bassam Tam discharge to home/self care.                   Follow-up Information       Follow up With Specialties Details Why Contact Info    Giovanni Mancuso MD Family Medicine Schedule an appointment as soon as possible for a visit in 2 days  [address] [place] [address]  [phone]              Patient's Medications   Discharge Prescriptions    HYOSCYAMINE (ANASPAZ,LEVSIN) 0.125 MG TABLET    Take 1 tablet (0.125 mg total) by mouth every 4 (four) hours as needed for cramping       Start Date: 8/28/2024 End Date: --       Order Dose: 0.125 mg       Quantity: 30 tablet    Refills: 0       No discharge procedures on file.    PDMP Review       None            ED Provider  Electronically Signed by             Tanner Rocha MD  08/28/24 [id number]

## 2024-09-04 ENCOUNTER — TELEPHONE (OUTPATIENT)
Age: 89
End: 2024-09-04

## 2024-09-04 NOTE — TELEPHONE ENCOUNTER
Kristine called to schedule a follow up for her  with  . I advise Bassam Carmona is on our recall list once  schedule opens in Nov we will give them a call to schedule an appt .

## 2024-09-06 ENCOUNTER — APPOINTMENT (OUTPATIENT)
Dept: LAB | Facility: HOSPITAL | Age: 89
End: 2024-09-06
Payer: MEDICARE

## 2024-09-06 DIAGNOSIS — E03.9 ACQUIRED HYPOTHYROIDISM: ICD-10-CM

## 2024-09-06 DIAGNOSIS — E11.40 CONTROLLED TYPE 2 DIABETES MELLITUS WITH DIABETIC NEUROPATHY, WITHOUT LONG-TERM CURRENT USE OF INSULIN (HCC): ICD-10-CM

## 2024-09-06 DIAGNOSIS — C61 PROSTATE CANCER (HCC): ICD-10-CM

## 2024-09-06 LAB — PSA SERPL-MCNC: <0.008 NG/ML (ref 0–4)

## 2024-09-06 PROCEDURE — 36415 COLL VENOUS BLD VENIPUNCTURE: CPT

## 2024-09-06 PROCEDURE — 84153 ASSAY OF PSA TOTAL: CPT

## 2024-09-10 ENCOUNTER — OFFICE VISIT (OUTPATIENT)
Dept: FAMILY MEDICINE CLINIC | Facility: CLINIC | Age: 89
End: 2024-09-10
Payer: MEDICARE

## 2024-09-10 VITALS
BODY MASS INDEX: 19.96 KG/M2 | HEIGHT: 70 IN | OXYGEN SATURATION: 100 % | WEIGHT: 139.4 LBS | DIASTOLIC BLOOD PRESSURE: 64 MMHG | HEART RATE: 101 BPM | TEMPERATURE: 96.7 F | SYSTOLIC BLOOD PRESSURE: 100 MMHG | RESPIRATION RATE: 18 BRPM

## 2024-09-10 DIAGNOSIS — R63.4 WEIGHT LOSS: ICD-10-CM

## 2024-09-10 DIAGNOSIS — R19.7 DIARRHEA, UNSPECIFIED TYPE: Primary | ICD-10-CM

## 2024-09-10 DIAGNOSIS — E44.1 MILD PROTEIN-CALORIE MALNUTRITION (HCC): ICD-10-CM

## 2024-09-10 PROCEDURE — G2211 COMPLEX E/M VISIT ADD ON: HCPCS | Performed by: FAMILY MEDICINE

## 2024-09-10 PROCEDURE — 99214 OFFICE O/P EST MOD 30 MIN: CPT | Performed by: FAMILY MEDICINE

## 2024-09-10 NOTE — ASSESSMENT & PLAN NOTE
Lost 6 lbs since 6/2024. Refer to GI.     Orders:    Ambulatory Referral to Gastroenterology; Future

## 2024-09-10 NOTE — PROGRESS NOTES
Ambulatory Visit  Name: Bassam Tam      : 1935      MRN: [id number]  Encounter Provider: Giovanni Mancuso MD  Encounter Date: 9/10/2024   Encounter department: [facility]  Chief Complaint   Patient presents with    Diarrhea     For 2 weeks     Follow-up     Urgent Care and ER f/u: Stomach Virus      Health Maintenance   Topic Date Due    PT PLAN OF CARE  Never done    RSV Vaccine Age 60+ Years (1 - 1-dose 60+ series) Never done    COVID-19 Vaccine ( season) 2024    Influenza Vaccine (1) 2024    Diabetic Foot Exam  10/17/2024    HEMOGLOBIN A1C  10/19/2024    Kidney Health Evaluation: Albumin/Creatinine Ratio  2025    Fall Risk  2025    Depression Screening  2025    Medicare Annual Wellness Visit (AWV)  2025    Kidney Health Evaluation: GFR  2025    DM Eye Exam  10/10/2025    Zoster Vaccine  Completed    Pneumococcal Vaccine: 65+ Years  Completed    RSV Vaccine age 0-20 Months  Aged Out    HIB Vaccine  Aged Out    IPV Vaccine  Aged Out    Hepatitis A Vaccine  Aged Out    Meningococcal ACWY Vaccine  Aged Out    HPV Vaccine  Aged Out     Assessment & Plan  Diarrhea, unspecified type  Reviewed ER report. Loose stool 2/day for 2 weeks. Check stool study and labs.     Orders:    Ambulatory Referral to Gastroenterology; Future    Clostridium difficile toxin by PCR with EIA; Future    Fecal leukocytes; Future    Ova and parasite examination; Future    Stool Enteric Bacterial Panel by PCR; Future    CBC and differential; Future    Comprehensive metabolic panel; Future    Weight loss  Lost 6 lbs since 2024. Refer to GI.     Orders:    Ambulatory Referral to Gastroenterology; Future    Mild protein-calorie malnutrition (HCC)  Malnutrition Findings:       BMI 20 today. Continue Ensure.                           BMI Findings:           Body mass index is 20 kg/m².               History of Present Illness     HPI    Pt is here by himself.     Felt  "stomach sick 2 weeks ago. Went to urgent care. Diagnosed of stomach bug.   Went to ER on 8/28/2024 for abdominal pain.   Labs ok.   CT abdomen pelvis showed   Distended gallbladder with trace pericholecystic fluid and fat stranding. Acute cholecystitis is possible. Recommend gallbladder ultrasound.   2. Mild diffuse colonic wall thickening may be due to colitis.   3. Possible mild urothelial thickening of the right renal pelvis and proximal ureter which could be infectious/inflammatory. Mild diffuse bladder wall thickening may be related to chronic outlet obstruction although cystitis is also possible. Recommend correlation with urinalysis.  US RUQ showed no acute cholecystitis.   Got IVF, levsin and omnipaque.   Discharged home with levsin.     Still has diarrhea. Loose BM, no blood in it. 1-2 times/day.   Feels weakness. No appetite.   Denies fever.   Denies nausea or vomit.   Denies abdominal pain.   Denies urinary problem.                 Review of Systems   Constitutional:  Negative for appetite change, chills and fever.   HENT:  Negative for congestion, ear pain, sinus pain and sore throat.    Eyes:  Negative for discharge and itching.   Respiratory:  Negative for apnea, cough, chest tightness, shortness of breath and wheezing.    Cardiovascular:  Negative for chest pain, palpitations and leg swelling.   Gastrointestinal:  Positive for diarrhea. Negative for abdominal pain, anal bleeding, constipation, nausea and vomiting.   Endocrine: Negative for cold intolerance, heat intolerance and polyuria.   Genitourinary:  Negative for difficulty urinating and dysuria.   Musculoskeletal:  Negative for arthralgias, back pain and myalgias.   Skin:  Negative for rash.   Neurological:  Negative for dizziness and headaches.   Psychiatric/Behavioral:  Negative for agitation.            Objective     /64   Pulse 101   Temp (!) 96.7 °F (35.9 °C) (Temporal)   Resp 18   Ht 5' 10\" (1.778 m)   Wt 63.2 kg (139 lb 6.4 oz) "   SpO2 100%   BMI 20.00 kg/m²     Physical Exam  Constitutional:       Appearance: He is well-developed.   HENT:      Head: Normocephalic and atraumatic.   Eyes:      General:         Right eye: No discharge.         Left eye: No discharge.      Conjunctiva/sclera: Conjunctivae normal.   Cardiovascular:      Rate and Rhythm: Normal rate and regular rhythm.      Heart sounds: Normal heart sounds. No murmur heard.     No friction rub. No gallop.   Pulmonary:      Effort: Pulmonary effort is normal. No respiratory distress.      Breath sounds: Normal breath sounds. No wheezing or rales.   Abdominal:      General: Bowel sounds are normal. There is no distension.      Palpations: Abdomen is soft.      Tenderness: There is no abdominal tenderness. There is no guarding.   Musculoskeletal:         General: Normal range of motion.      Cervical back: Normal range of motion and neck supple.      Right lower leg: No edema.      Left lower leg: No edema.   Neurological:      Mental Status: He is alert.

## 2024-09-10 NOTE — ASSESSMENT & PLAN NOTE
Malnutrition Findings:       BMI 20 today. Continue Ensure.                           BMI Findings:           Body mass index is 20 kg/m².

## 2024-09-10 NOTE — ASSESSMENT & PLAN NOTE
Reviewed ER report. Loose stool 2/day for 2 weeks. Check stool study and labs.     Orders:    Ambulatory Referral to Gastroenterology; Future    Clostridium difficile toxin by PCR with EIA; Future    Fecal leukocytes; Future    Ova and parasite examination; Future    Stool Enteric Bacterial Panel by PCR; Future    CBC and differential; Future    Comprehensive metabolic panel; Future

## 2024-09-11 ENCOUNTER — APPOINTMENT (OUTPATIENT)
Dept: LAB | Facility: HOSPITAL | Age: 89
End: 2024-09-11
Payer: MEDICARE

## 2024-09-11 ENCOUNTER — OFFICE VISIT (OUTPATIENT)
Dept: RADIATION ONCOLOGY | Facility: CLINIC | Age: 89
End: 2024-09-11
Attending: RADIOLOGY
Payer: MEDICARE

## 2024-09-11 VITALS
HEART RATE: 84 BPM | TEMPERATURE: 97.2 F | SYSTOLIC BLOOD PRESSURE: 98 MMHG | BODY MASS INDEX: 19.51 KG/M2 | DIASTOLIC BLOOD PRESSURE: 61 MMHG | WEIGHT: 136 LBS | OXYGEN SATURATION: 96 %

## 2024-09-11 DIAGNOSIS — C61 PROSTATE CANCER (HCC): Primary | ICD-10-CM

## 2024-09-11 DIAGNOSIS — R19.7 DIARRHEA, UNSPECIFIED TYPE: ICD-10-CM

## 2024-09-11 LAB
ALBUMIN SERPL BCG-MCNC: 3.7 G/DL (ref 3.5–5)
ALP SERPL-CCNC: 37 U/L (ref 34–104)
ALT SERPL W P-5'-P-CCNC: 12 U/L (ref 7–52)
ANION GAP SERPL CALCULATED.3IONS-SCNC: 5 MMOL/L (ref 4–13)
AST SERPL W P-5'-P-CCNC: 16 U/L (ref 13–39)
BASOPHILS # BLD AUTO: 0.02 THOUSANDS/ΜL (ref 0–0.1)
BASOPHILS NFR BLD AUTO: 0 % (ref 0–1)
BILIRUB SERPL-MCNC: 0.58 MG/DL (ref 0.2–1)
BUN SERPL-MCNC: 20 MG/DL (ref 5–25)
C COLI+JEJUNI TUF STL QL NAA+PROBE: NEGATIVE
C DIFF TOX GENS STL QL NAA+PROBE: NEGATIVE
CALCIUM SERPL-MCNC: 9.4 MG/DL (ref 8.4–10.2)
CHLORIDE SERPL-SCNC: 103 MMOL/L (ref 96–108)
CO2 SERPL-SCNC: 29 MMOL/L (ref 21–32)
CREAT SERPL-MCNC: 0.98 MG/DL (ref 0.6–1.3)
EC STX1+STX2 GENES STL QL NAA+PROBE: NEGATIVE
EOSINOPHIL # BLD AUTO: 0.07 THOUSAND/ΜL (ref 0–0.61)
EOSINOPHIL NFR BLD AUTO: 1 % (ref 0–6)
ERYTHROCYTE [DISTWIDTH] IN BLOOD BY AUTOMATED COUNT: 12.5 % (ref 11.6–15.1)
EST. AVERAGE GLUCOSE BLD GHB EST-MCNC: 177 MG/DL
GFR SERPL CREATININE-BSD FRML MDRD: 68 ML/MIN/1.73SQ M
GLUCOSE P FAST SERPL-MCNC: 143 MG/DL (ref 65–99)
HBA1C MFR BLD: 7.8 %
HCT VFR BLD AUTO: 36.5 % (ref 36.5–49.3)
HGB BLD-MCNC: 12 G/DL (ref 12–17)
IMM GRANULOCYTES # BLD AUTO: 0.02 THOUSAND/UL (ref 0–0.2)
IMM GRANULOCYTES NFR BLD AUTO: 0 % (ref 0–2)
LYMPHOCYTES # BLD AUTO: 1.76 THOUSANDS/ΜL (ref 0.6–4.47)
LYMPHOCYTES NFR BLD AUTO: 27 % (ref 14–44)
MCH RBC QN AUTO: 29.9 PG (ref 26.8–34.3)
MCHC RBC AUTO-ENTMCNC: 32.9 G/DL (ref 31.4–37.4)
MCV RBC AUTO: 91 FL (ref 82–98)
MONOCYTES # BLD AUTO: 0.5 THOUSAND/ΜL (ref 0.17–1.22)
MONOCYTES NFR BLD AUTO: 8 % (ref 4–12)
NEUTROPHILS # BLD AUTO: 4.09 THOUSANDS/ΜL (ref 1.85–7.62)
NEUTS SEG NFR BLD AUTO: 64 % (ref 43–75)
NRBC BLD AUTO-RTO: 0 /100 WBCS
PLATELET # BLD AUTO: 217 THOUSANDS/UL (ref 149–390)
PMV BLD AUTO: 9 FL (ref 8.9–12.7)
POTASSIUM SERPL-SCNC: 3.9 MMOL/L (ref 3.5–5.3)
PROT SERPL-MCNC: 6.3 G/DL (ref 6.4–8.4)
RBC # BLD AUTO: 4.02 MILLION/UL (ref 3.88–5.62)
SALMONELLA SP SPAO STL QL NAA+PROBE: NEGATIVE
SHIGELLA SP+EIEC IPAH STL QL NAA+PROBE: NEGATIVE
SODIUM SERPL-SCNC: 137 MMOL/L (ref 135–147)
TSH SERPL DL<=0.05 MIU/L-ACNC: 3.65 UIU/ML (ref 0.45–4.5)
WBC # BLD AUTO: 6.46 THOUSAND/UL (ref 4.31–10.16)

## 2024-09-11 PROCEDURE — 89055 LEUKOCYTE ASSESSMENT FECAL: CPT

## 2024-09-11 PROCEDURE — 87177 OVA AND PARASITES SMEARS: CPT

## 2024-09-11 PROCEDURE — 84443 ASSAY THYROID STIM HORMONE: CPT

## 2024-09-11 PROCEDURE — 87505 NFCT AGENT DETECTION GI: CPT

## 2024-09-11 PROCEDURE — 80053 COMPREHEN METABOLIC PANEL: CPT

## 2024-09-11 PROCEDURE — 99213 OFFICE O/P EST LOW 20 MIN: CPT | Performed by: RADIOLOGY

## 2024-09-11 PROCEDURE — 99211 OFF/OP EST MAY X REQ PHY/QHP: CPT | Performed by: RADIOLOGY

## 2024-09-11 PROCEDURE — 83036 HEMOGLOBIN GLYCOSYLATED A1C: CPT

## 2024-09-11 PROCEDURE — 36415 COLL VENOUS BLD VENIPUNCTURE: CPT

## 2024-09-11 PROCEDURE — 87209 SMEAR COMPLEX STAIN: CPT

## 2024-09-11 PROCEDURE — 85025 COMPLETE CBC W/AUTO DIFF WBC: CPT

## 2024-09-11 RX ORDER — MULTIVITAMIN
1 TABLET ORAL DAILY
COMMUNITY

## 2024-09-11 NOTE — PROGRESS NOTES
Follow-up - Radiation Oncology   Bassam Tam 1935 89 y.o. male [id number]      History of Present Illness   Cancer Staging   Prostate cancer (HCC)  Staging form: Prostate, AJCC 8th Edition  - Clinical stage from 9/6/2023: Stage RUBY (cT1c, cN1, cM0, PSA: 31, Grade Group: 3) - Signed by Kilo Carter MD on 9/6/2023  Histopathologic type: Adenocarcinoma, NOS  Stage prefix: Initial diagnosis  Prostate specific antigen (PSA) range: 20 or greater  Parul primary pattern: 4  Parul secondary pattern: 3  Posen score: 7  Histologic grading system: 5 grade system  Location of positive needle core biopsies: Both sides      Bassam Tam is a 89 y.o. year old male with a history of Posen 7 (4+3) prostate cancer. He completed RT to prostate on 1/29/24. He was last seen on 3/12/24. He returns today for follow up.      Interval History:    PSA   Latest Ref Rng 0.000 - 4.000 ng/mL   5/31/2023 31.01 (H)    6/14/2023 25.48 (H)    3/6/2024 <0.01    9/6/2024 <0.008       4/30/24 [facility]  Lupron discontinued do to side effects, and do to most recent PSA being undetectable. PSA in 6 months.      9/10/24 Dr. Mancuso - PCP -evaluated for diarrhea  Stool studies and blood work ordered in addition referral made to gastroenterology.    He is seen for follow-up today with his wife. He has recovered well from radiation therapy. He also had COVID infection during treatment. He reports no pain and no dysuria. He has no hematuria. He has stable nocturia now 2-3 times per night which is his baseline.  His diarrhea after radiation therapy had resolved by early March.  He started having diarrhea again in late August which is slightly better now.  He has 1 bowel movement a day that is semisoft.  He is on Imodium twice a day.  He has lost about 6 pounds.  He has a reduced appetite because when he eats he has to have a bowel movement.  He is taking 2 Ensure per day along with small meals.  He reports fatigue because  he is not sleeping well.  He was seen yesterday by Dr. Szymanski and stool studies as well as blood work ordered.  He will also be seeing gastroenterology. He has had1 Lupron injection and reports no hot flashes.  He does report decrease in size of his testicles and penis.  Decision was made to discontinue Lupron on  when he was seen by urology.    Upcomin24 Dr. Mancuso  24 [place] with PSA    Historical Information   Oncology History   Prostate cancer (HCC)   2023 Initial Diagnosis    Prostate cancer (HCC)     2023 Biopsy    A. Prostate, right lateral base:  - Histologic Type: Acinar adenocarcinoma  - Parul score: 3 + 4 = 7  - Involvement: Involving 10% and 10% of  2 of 2 cores.  - Grade group: II  - Percentage of pattern 4: approximately 10%  - Perineural invasion: Absent     -  Multiplex immunohistochemical stain performed with appropriate controls shows malignant glands with loss of basal layer cells staining for p63 and high molecular weight keratin with luminal racemase expression, supporting the diagnosis.     B. Prostate, right medial base:  - Histologic Type: Acinar adenocarcinoma  - Glynn score: 3 + 4 = 7  - Involvement: Involving 95% of 1 of 2 core.  - Grade group: II  - Percentage of pattern 4: 5-10%  - Perineural invasion: Absent      -  Multiplex immunohistochemical stain performed with appropriate controls on block B2 shows benign glands with intact basal layer cells staining for p63 and high molecular weight keratin with negative luminal racemase expression, supporting the diagnosis.     C. Prostate, right lateral mid:  - Histologic Type: Acinar adenocarcinoma  - Glynn score: 3 + 4 = 7  - Involvement: Involving 90% of 1 of 1 core.  - Grade group: II  - Percentage of pattern 4: 15%  - Perineural invasion: Absent     D. Prostate, right medial mis:  - Histologic Type: Acinar adenocarcinoma  - Parul score: 3 + 4 = 7  - Involvement: Involving 60% of  1 of 1 core.  -  Grade group: II  - Percentage of pattern 4: 30%  - Perineural invasion: Absent     E. Prostate, right lateral apex:  - Histologic Type: Acinar adenocarcinoma  - [place] score: 3 + 4 = 7  - Involvement: Involving 95% of  1 of 1 core.  - Grade group: II  - Percentage of pattern 4: 15%  - Perineural invasion: Absent     F. Prostate, right medial apex:  - Histologic Type: Acinar adenocarcinoma  - Parul score: 3 + 4 = 7  - Involvement: Involving 50% of 1 of 1 core.  - Grade group: II  - Percentage of pattern 4: 25%  - Perineural invasion: Absent     G. Prostate, left lateral base:  - Benign prostatic tissue.  - Negative for malignancy.       -  Multiplex immunohistochemical stain performed with appropriate controls shows benign glands with intact basal layer cells staining for p63 and high molecular weight keratin with negative luminal racemase expression, supporting the diagnosis.     H. Prostate, left medial base:  - Benign prostatic tissue.  - Negative for malignancy.      -  Multiplex immunohistochemical stain performed with appropriate controls shows benign glands with intact basal layer cells staining for p63 and high molecular weight keratin with negative luminal racemase expression, supporting the diagnosis.     I. Prostate, left lateral mid:  - Histologic Type: Acinar adenocarcinoma  - Parul score: 3 + 3 = 6  - Involvement: Involving 20% of 1 of 1 core.  - Grade group: I  - Perineural invasion: Absent     J. Prostate, left medial mid:  - Benign prostatic tissue.  - Negative for malignancy.      -  Multiplex immunohistochemical stain performed with appropriate controls shows benign glands with intact basal layer cells staining for p63 and high molecular weight keratin with negative luminal racemase expression, supporting the diagnosis.     K. Prostate, left lateral apex:  - Histologic Type: Acinar adenocarcinoma  - Parul score: 4 + 3 = 7  - Involvement: Involving 95% discontinuously of  1 of 1 core.  -  Grade group: III  - Percentage of pattern 4: 90%  - Perineural invasion: Absent      L. Prostate, left medial apex :  - Histologic Type: Acinar adenocarcinoma  - Brodnax score: 4 + 3 = 7  - Involvement: Involving 90% discontinuously of  1 of 1 core.  - Grade group: III  - Percentage of pattern 4: 80%  - Perineural invasion: Absent      -  Multiplex immunohistochemical stain performed with appropriate controls shows malignant glands with loss of basal layer cells staining for p63 and high molecular weight keratin with luminal racemase expression, supporting the diagnosis.     9/6/2023 -  Cancer Staged    Staging form: Prostate, AJCC 8th Edition  - Clinical stage from 9/6/2023: Stage RUBY (cT1c, cN1, cM0, PSA: 31, Grade Group: 3) - Signed by Kilo Carter MD on 9/6/2023  Histopathologic type: Adenocarcinoma, NOS  Stage prefix: Initial diagnosis  Prostate specific antigen (PSA) range: 20 or greater  [place] primary pattern: 4  Parul secondary pattern: 3  [place] score: 7  Histologic grading system: 5 grade system  Location of positive needle core biopsies: Both sides       11/19/2023 - 1/29/2024 Radiation      Plan ID Energy Fractions Dose per Fraction (cGy) Dose Correction (cGy) Total Dose Delivered (cGy) Elapsed Days   CD Prost_pSV 6X-FFF 19 / 19 180 0 3,420 27   Whole Pel_SIB 6X-FFF 25 / 25 220 0 5,500 40      Treatment dates:  C1: 11/19/2023 - 1/29/2024         Past Medical History:   Diagnosis Date    Abnormal loss of weight     last assessed: 4/24/13    Allergic     Arthritis     Diabetes mellitus (HCC)     Diverticulitis     Diverticulitis of colon     Diverticulosis     Prostate cancer (HCC)     Shingles     Skin cancer     Type 2 diabetes mellitus (HCC)      Past Surgical History:   Procedure Laterality Date    APPENDECTOMY      Age 16    CATARACT EXTRACTION      right 5/2002, left 12/2003    COLON SURGERY      for diverticulitis    COLONOSCOPY      done 5/5/2009, mild diverticulosis noted,  recheck in 3 years    FL INJECTION LEFT HIP (NON ARTHROGRAM)  11/01/2022    FL INJECTION RIGHT HIP (NON ARTHROGRAM)  11/01/2022    INNER EAR SURGERY Left     Cochlear device implantation; resolved: Sep 2012    MOHS SURGERY Left     left wrist    [place] PLMT INTERSTITIAL DEV RADIAT TX PROSTATE 1/MULT N/A 10/24/2023    Procedure: INSERTION OF FIDUCIAL MARKER , SPACEOAR;  Surgeon: Gus Solis MD;  Location: [facility];  Service: Urology    SHOULDER SURGERY  05/2007    right rotator cuff       Social History   Social History     Substance and Sexual Activity   Alcohol Use Yes    Comment: social drinker as per [employer]     Social History     Substance and Sexual Activity   Drug Use No     Social History     Tobacco Use   Smoking Status Never    Passive exposure: Never   Smokeless Tobacco Never     Meds/Allergies     Current Outpatient Medications:     levothyroxine 75 mcg tablet, TAKE 1 TABLET(75 MCG) BY MOUTH DAILY, Disp: 90 tablet, Rfl: 3    Multiple Vitamin (multivitamin) tablet, Take 1 tablet by mouth daily, Disp: , Rfl:     Multiple Vitamins-Minerals (MULTIVITAMIN GUMMIES ADULTS PO), Take 2 tablets by mouth daily, Disp: , Rfl:     amoxicillin (AMOXIL) 500 mg capsule, take 1 capsule by mouth every 8 hours until gone (Patient not taking: Reported on 5/1/2024), Disp: , Rfl:     chlorhexidine (PERIDEX) 0.12 % solution, , Disp: , Rfl:     hyoscyamine (ANASPAZ,LEVSIN) 0.125 MG tablet, Take 1 tablet (0.125 mg total) by mouth every 4 (four) hours as needed for cramping (Patient not taking: Reported on 9/11/2024), Disp: 30 tablet, Rfl: 0    meclizine (ANTIVERT) 25 mg tablet, Take 1 tablet (25 mg total) by mouth every 8 (eight) hours as needed for dizziness (Patient not taking: Reported on 5/16/2024), Disp: 30 tablet, Rfl: 0    oxyCODONE-acetaminophen (PERCOCET) 5-325 mg per tablet, Take 1 tablet by mouth every 6 (six) hours as needed (Patient not taking: Reported on 8/28/2024), Disp: , Rfl:     tadalafil (CIALIS) 20 MG  tablet, Take 1 tablet (20 mg total) by mouth daily as needed for erectile dysfunction (Patient not taking: Reported on 5/16/2024), Disp: 10 tablet, Rfl: 5    umeclidinium-vilanterol (Anoro Ellipta) 62.5-25 mcg/actuation inhaler, Inhale 1 puff daily, Disp: 60 blister, Rfl: 5  No Known Allergies    Review of Systems  Gastrointestinal:  Positive for diarrhea. Negative for abdominal pain, anal bleeding and blood in stool.   Endocrine: Negative for heat intolerance.   Genitourinary:  Positive for frequency and urgency. Negative for dysuria and hematuria.     IPSS Questionnaire (AUA-7):  Over the past month…     1)  How often have you had a sensation of not emptying your bladder completely after you finish urinating?  0 - Not at all   2)  How often have you had to urinate again less than two hours after you finished urinating? 2 - Less than half the time   3)  How often have you found you stopped and started again several times when you urinated?  0 - Not at all   4) How difficult have you found it to postpone urination?  1 - Less than 1 time in 5   5) How often have you had a weak urinary stream?  1 - Less than 1 time in 5   6) How often have you had to push or strain to begin urination?  0 - Not at all   7) How many times did you most typically get up to urinate from the time you went to bed until the time you got up in the morning?  3 - 3 times   Total Score:  7     OBJECTIVE:   BP 98/61   Pulse 84   Temp (!) 97.2 °F (36.2 °C)   Wt 61.7 kg (136 lb)   SpO2 96%   BMI 19.51 kg/m²   Pain Assessment:  0  ECOG/Zubrod/WHO: 1 - Symptomatic but completely ambulatory    Physical Exam   Vitals and nursing note reviewed.   Constitutional:       General: He is not in acute distress.     Appearance: He is well-developed. He is not diaphoretic.   HENT:      Head: Normocephalic and atraumatic.      Mouth/Throat:      Pharynx: No oropharyngeal exudate.   Eyes:      General: No scleral icterus.     Conjunctiva/sclera: Conjunctivae  normal.      Pupils: Pupils are equal, round, and reactive to light.   Neck:      Thyroid: No thyromegaly.      Trachea: No tracheal deviation.   Cardiovascular:      Rate and Rhythm: Normal rate and regular rhythm.      Heart sounds: Normal heart sounds.   Pulmonary:      Effort: Pulmonary effort is normal. No respiratory distress.      Breath sounds: Normal breath sounds. No stridor. No wheezing, rhonchi or rales.   Chest:      Chest wall: No tenderness.   Abdominal:      General: Abdomen is flat. Bowel sounds are normal. There is no distension.      Palpations: Abdomen is soft. There is no mass.      Tenderness: There is no abdominal tenderness.      Hernia: No hernia is present.   Genitourinary:     Comments: Rectal examination deferred.  Musculoskeletal:         General: No swelling or tenderness. Normal range of motion.      Cervical back: Normal range of motion and neck supple.   Lymphadenopathy:      Cervical: No cervical adenopathy.      Upper Body:      Right upper body: No supraclavicular adenopathy.      Left upper body: No supraclavicular adenopathy.      Lower Body: No right inguinal adenopathy. No left inguinal adenopathy.   Skin:     General: Skin is warm and dry.      Coloration: Skin is not jaundiced or pale.      Findings: No erythema or rash.   Neurological:      General: No focal deficit present.      Mental Status: He is alert and oriented to person, place, and time.      Cranial Nerves: No cranial nerve deficit.      Sensory: No sensory deficit.      Motor: No weakness.      Coordination: Coordination normal.   Psychiatric:         Behavior: Behavior normal.         Thought Content: Thought content normal.         Judgment: Judgment normal.     RESULTS    Lab Results:   Recent Results (from the past 672 hour(s))   CBC and differential    Collection Time: 08/28/24  9:56 AM   Result Value Ref Range    WBC 6.77 4.31 - 10.16 Thousand/uL    RBC 3.89 3.88 - 5.62 Million/uL    Hemoglobin 11.8 (L)  12.0 - 17.0 g/dL    Hematocrit 34.6 (L) 36.5 - 49.3 %    MCV 89 82 - 98 fL    MCH 30.3 26.8 - 34.3 pg    MCHC 34.1 31.4 - 37.4 g/dL    RDW 12.6 11.6 - 15.1 %    MPV 9.1 8.9 - 12.7 fL    Platelets 172 149 - 390 Thousands/uL    nRBC 0 /100 WBCs    Segmented % 80 (H) 43 - 75 %    Immature Grans % 0 0 - 2 %    Lymphocytes % 10 (L) 14 - 44 %    Monocytes % 10 4 - 12 %    Eosinophils Relative 0 0 - 6 %    Basophils Relative 0 0 - 1 %    Absolute Neutrophils 5.38 1.85 - 7.62 Thousands/µL    Absolute Immature Grans 0.03 0.00 - 0.20 Thousand/uL    Absolute Lymphocytes 0.64 0.60 - 4.47 Thousands/µL    Absolute Monocytes 0.70 0.17 - 1.22 Thousand/µL    Eosinophils Absolute 0.01 0.00 - 0.61 Thousand/µL    Basophils Absolute 0.01 0.00 - 0.10 Thousands/µL   Comprehensive metabolic panel    Collection Time: 08/28/24  9:56 AM   Result Value Ref Range    Sodium 134 (L) 135 - 147 mmol/L    Potassium 3.7 3.5 - 5.3 mmol/L    Chloride 99 96 - 108 mmol/L    CO2 26 21 - 32 mmol/L    ANION GAP 9 4 - 13 mmol/L    BUN 18 5 - 25 mg/dL    Creatinine 1.02 0.60 - 1.30 mg/dL    Glucose 180 (H) 65 - 140 mg/dL    Calcium 9.4 8.4 - 10.2 mg/dL    AST 15 13 - 39 U/L    ALT 12 7 - 52 U/L    Alkaline Phosphatase 51 34 - 104 U/L    Total Protein 6.5 6.4 - 8.4 g/dL    Albumin 3.8 3.5 - 5.0 g/dL    Total Bilirubin 0.97 0.20 - 1.00 mg/dL    eGFR 64 ml/min/1.73sq m   Lipase    Collection Time: 08/28/24  9:56 AM   Result Value Ref Range    Lipase 6 (L) 11 - 82 u/L   Urine Macroscopic, POC    Collection Time: 08/28/24  2:30 PM   Result Value Ref Range    Color, UA Yellow     Clarity, UA Clear     pH, UA 5.5 4.5 - 8.0    Leukocytes, UA Negative Negative    Nitrite, UA Negative Negative    Protein, UA Negative Negative mg/dl    Glucose, UA Negative Negative mg/dl    Ketones, UA Trace (A) Negative mg/dl    Urobilinogen, UA 0.2 0.2, 1.0 E.U./dl E.U./dl    Bilirubin, UA Negative Negative    Occult Blood, UA Negative Negative    Specific Gravity, UA 1.010 1.003 -  1.030   PSA Total, Diagnostic    Collection Time: 09/06/24  8:13 AM   Result Value Ref Range    PSA, Diagnostic <0.008 0.000 - 4.000 ng/mL   Comprehensive metabolic panel    Collection Time: 09/11/24  7:41 AM   Result Value Ref Range    Sodium 137 135 - 147 mmol/L    Potassium 3.9 3.5 - 5.3 mmol/L    Chloride 103 96 - 108 mmol/L    CO2 29 21 - 32 mmol/L    ANION GAP 5 4 - 13 mmol/L    BUN 20 5 - 25 mg/dL    Creatinine 0.98 0.60 - 1.30 mg/dL    Glucose, Fasting 143 (H) 65 - 99 mg/dL    Calcium 9.4 8.4 - 10.2 mg/dL    AST 16 13 - 39 U/L    ALT 12 7 - 52 U/L    Alkaline Phosphatase 37 34 - 104 U/L    Total Protein 6.3 (L) 6.4 - 8.4 g/dL    Albumin 3.7 3.5 - 5.0 g/dL    Total Bilirubin 0.58 0.20 - 1.00 mg/dL    eGFR 68 ml/min/1.73sq m   TSH, 3rd generation with Free T4 reflex    Collection Time: 09/11/24  7:41 AM   Result Value Ref Range    TSH 3RD GENERATON 3.655 0.450 - 4.500 uIU/mL   CBC and differential    Collection Time: 09/11/24  7:41 AM   Result Value Ref Range    WBC 6.46 4.31 - 10.16 Thousand/uL    RBC 4.02 3.88 - 5.62 Million/uL    Hemoglobin 12.0 12.0 - 17.0 g/dL    Hematocrit 36.5 36.5 - 49.3 %    MCV 91 82 - 98 fL    MCH 29.9 26.8 - 34.3 pg    MCHC 32.9 31.4 - 37.4 g/dL    RDW 12.5 11.6 - 15.1 %    MPV 9.0 8.9 - 12.7 fL    Platelets 217 149 - 390 Thousands/uL    nRBC 0 /100 WBCs    Segmented % 64 43 - 75 %    Immature Grans % 0 0 - 2 %    Lymphocytes % 27 14 - 44 %    Monocytes % 8 4 - 12 %    Eosinophils Relative 1 0 - 6 %    Basophils Relative 0 0 - 1 %    Absolute Neutrophils 4.09 1.85 - 7.62 Thousands/µL    Absolute Immature Grans 0.02 0.00 - 0.20 Thousand/uL    Absolute Lymphocytes 1.76 0.60 - 4.47 Thousands/µL    Absolute Monocytes 0.50 0.17 - 1.22 Thousand/µL    Eosinophils Absolute 0.07 0.00 - 0.61 Thousand/µL    Basophils Absolute 0.02 0.00 - 0.10 Thousands/µL     Imaging Studies:CT abdomen pelvis with contrast    Addendum Date: 8/28/2024 Addendum:   ADDENDUM: Addition to impression: 4. A 0.6 cm  opacity in the right lower lobe could be due to atelectasis or may be inflammatory. Recommend follow-up unenhanced chest CT in 3-6 months to assess for resolution. The study was marked in EPIC for significant notification.     Result Date: 8/28/2024  Narrative: CT ABDOMEN AND PELVIS WITH IV CONTRAST INDICATION: Acute abdominal pain, diarrhea. COMPARISON: CT chest abdomen pelvis 5/5/2023 TECHNIQUE: CT examination of the abdomen and pelvis was performed. Multiplanar 2D reformatted images were created from the source data. This examination, like all CT scans performed in the [facility], was performed utilizing techniques to minimize radiation dose exposure, including the use of iterative reconstruction and automated exposure control. Radiation dose length product (DLP) for this visit: 466 mGy-cm IV Contrast: 85 mL of iohexol (OMNIPAQUE) Enteric Contrast: Not administered. FINDINGS: ABDOMEN LOWER CHEST: A 0.6 cm opacity in the right lower lobe (series 2 image 7) appears somewhat linear on sagittal and coronal planes, could be due to atelectasis or may be inflammatory. LIVER/BILIARY TREE: Subcentimeter hypoattenuating lesion(s), too small to characterize but stable and but statistically likely benign, which do not require follow-up (ACR White Paper 2017). No suspicious mass. Normal hepatic contours. No biliary dilation. GALLBLADDER: Distended gallbladder with trace pericholecystic fluid and fat stranding. No calcified gallstones. SPLEEN: Calcified granulomata within the spleen. Otherwise unremarkable. PANCREAS: Mild diffuse prominence of the main pancreatic duct measuring up to 3 mm (series 2 image 38). Possible small cystic lesion in the pancreatic neck measuring 5 mm (series 2 image 37) noted on axial images although this is not clearly visualized on reformatted images and could be artifactual. On 7/31/2016, a 7 mm cystic lesion was seen in the pancreatic neck. ADRENAL GLANDS: Unremarkable.  KIDNEYS/URETERS: Unchanged malrotated right kidney, normal anatomic variant. No hydronephrosis or hydroureter. Bilateral extrarenal pelvises, also normal variant. Possible mild urothelial thickening of the right renal pelvis and proximal ureter. STOMACH AND BOWEL: Mild diffuse colonic wall thickening may be due to colitis. There are anastomotic sutures in the sigmoid colon. Colonic diverticulosis without findings of acute diverticulitis. Duodenal diverticulum. APPENDIX: Surgically absent. ABDOMINOPELVIC CAVITY: No ascites. No pneumoperitoneum. No lymphadenopathy. VESSELS: Atherosclerosis without abdominal aortic aneurysm. PELVIS REPRODUCTIVE ORGANS: Prostatomegaly with fiducial markers. URINARY BLADDER: Scattered bladder diverticula. Mild diffuse bladder wall thickening may be related to chronic outlet obstruction although cystitis is also possible. ABDOMINAL WALL/INGUINAL REGIONS: Small bilateral fat-containing inguinal hernias. BONES: No acute fracture or suspicious osseous lesion. Spinal degenerative changes. [place]     Impression: 1. Distended gallbladder with trace pericholecystic fluid and fat stranding. Acute cholecystitis is possible. Recommend gallbladder ultrasound. 2. Mild diffuse colonic wall thickening may be due to colitis. 3. Possible mild urothelial thickening of the right renal pelvis and proximal ureter which could be infectious/inflammatory. Mild diffuse bladder wall thickening may be related to chronic outlet obstruction although cystitis is also possible. Recommend correlation with urinalysis. The study was marked in EPIC for immediate notification. Resident: Alfonso Shore I, the attending radiologist, have reviewed the images and agree with the final report above. Workstation performed: [id number]     US right upper quadrant    Result Date: 8/28/2024  Narrative: RIGHT UPPER QUADRANT ULTRASOUND INDICATION: r/o cholecystitis, abnormal ct. COMPARISON: Ultrasound dated 11/13/2023. CT  dated 8/20/2024. PET/CT dated 9/1/2023. TECHNIQUE: Real-time ultrasound of the right upper quadrant was performed with a curvilinear transducer with both volumetric sweeps and still imaging techniques. FINDINGS: PANCREAS: Visualized portions of the pancreas are within normal limits. AORTA AND IVC: Visualized portions are normal for patient age. LIVER: Size: Within normal range. The liver measures 12.0 cm in the midclavicular line. Contour: Surface contour is smooth. Parenchyma: Echogenicity and echotexture are within normal limits. No liver mass identified. Limited imaging of the main portal vein shows it to be patent and hepatopetal. BILIARY: Gallbladder is adequately distended but not hydropic. No stones. No pericholecystic fluid. Wall thickness top normal at 4 mm diffusely. Sonographic Rodney sign reported as negative. No intrahepatic biliary dilatation. CBD measures 5.0 mm. No choledocholithiasis. KIDNEY: Right kidney measures 11.2 x 3.9 x 3.6 cm. Volume 81.0 mL Kidney within normal limits. ASCITES: None.     Impression: No findings to suggest acute cholecystitis or other acute abnormalities. Suspect that pericholecystic fluid seen on recent CT was secondary to colonic inflammation better seen on CT. Workstation performed: [id number]     Assessment/Plan:  Orders Placed This Encounter   Procedures    PSA Total, Diagnostic        Bassam Tam is a 89 y.o. year old male with clinical stage RUBY, cT1c, cN1, M0 Parul score 4+3=7 prostate adenocarcinoma diagnosed after an elevated PSA of 31.01 May 31, 2023 that was repeated June 14, 2023 and was 25.48.  He reports this was his first PSA level.  He has a long history of BPH.  He had his first prostate biopsies on July 31, 2023 that revealed Parul score 4+3=7 disease in addition to Parul score 3+4=7 and 3+3=6 disease in multiple biopsies bilaterally. There were a total of 10 out of 12 biopsy cores that were positive.  Patient was seen by Dr. Ramirez and PSMA  "PET/CT was ordered and performed on September 1, 2023.  This revealed heterogeneous multifocal tracer activity involving the prostate gland consistent with malignancy.  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  We recommended radiation to the whole pelvis along with a boost to the lymph node and his prostate primary with a total of 9 weeks/44 fractions of radiation therapy.  We also recommended androgen deprivation therapy 2 to 3 months prior to the start of radiation therapy and he did have Lupron September 8, 2023.  He completed radiation therapy to a total dose of 7920 cGy along with 5500 cGy to the left pelvic lymph node on January 29, 2024.     He returns for follow-up examination today and has recovered well from radiation therapy.  PSA level has gone down to less than 0.01 NG/mL on March 6, 2024 and his most recent PSA level decreased further on September 6, 2024 to less than 0.008 NG/mL.  We discussed with him and his wife that this indicates an excellent response to treatment.  Due to the side effects of Lupron and his PSA being nearly undetectable, decision was made to discontinue Lupron during his last urology visit April 30, 2024.  He has new onset diarrhea in late August 2024 which we explained is not secondary to radiation therapy.  He is having no blood in the stools.  He saw Dr. Mancuso yesterday and we agree with stool workup and blood work as well as referral to gastroenterology.  He has urology appointment scheduled for November 13, 2024.  He will return here for follow-up in 6 months with a repeat PSA level.      Kilo Carter MD  9/11/2024,9:36 AM    Portions of the record may have been created with voice recognition software.  Occasional wrong word or \"sound a like\" substitutions may have occurred due to the inherent limitations of voice recognition software.  Read " the chart carefully and recognize, using context, where substitutions have occurred.

## 2024-09-11 NOTE — PROGRESS NOTES
Bassam Tam 1935 is a 89 y.o. male With h/o [place] 7 (4+3) prostate cancer. He completed RT to prostate on 1/29/24. He was last seen on 3/12/24. He returns today for follow up.      PSA   Latest Ref Rng 0.000 - 4.000 ng/mL   5/31/2023 31.01 (H)    6/14/2023 25.48 (H)    3/6/2024 <0.01    9/6/2024 <0.008           4/30/24 [facility]  Lupron discontinued do to side effects, and do to most recent PSA being undetectable. PSA in 6 months.       [place]   [place] [facility] [place]     Oncology History   Prostate cancer (HCC)   7/31/2023 Initial Diagnosis    Prostate cancer (HCC)     7/31/2023 Biopsy    A. Prostate, right lateral base:  - Histologic Type: Acinar adenocarcinoma  - Parul score: 3 + 4 = 7  - Involvement: Involving 10% and 10% of  2 of 2 cores.  - Grade group: II  - Percentage of pattern 4: approximately 10%  - Perineural invasion: Absent     -  Multiplex immunohistochemical stain performed with appropriate controls shows malignant glands with loss of basal layer cells staining for p63 and high molecular weight keratin with luminal racemase expression, supporting the diagnosis.     B. Prostate, right medial base:  - Histologic Type: Acinar adenocarcinoma  - Parul score: 3 + 4 = 7  - Involvement: Involving 95% of 1 of 2 core.  - Grade group: II  - Percentage of pattern 4: 5-10%  - Perineural invasion: Absent      -  Multiplex immunohistochemical stain performed with appropriate controls on block B2 shows benign glands with intact basal layer cells staining for p63 and high molecular weight keratin with negative luminal racemase expression, supporting the diagnosis.     C. Prostate, right lateral mid:  - Histologic Type: Acinar adenocarcinoma  - Parul score: 3 + 4 = 7  - Involvement: Involving 90% of 1 of 1 core.  - Grade group: II  - Percentage of pattern 4: 15%  - Perineural invasion: Absent     D. Prostate, right medial mis:  - Histologic Type: Acinar adenocarcinoma  -  Parul score: 3 + 4 = 7  - Involvement: Involving 60% of  1 of 1 core.  - Grade group: II  - Percentage of pattern 4: 30%  - Perineural invasion: Absent     E. Prostate, right lateral apex:  - Histologic Type: Acinar adenocarcinoma  - Lyon score: 3 + 4 = 7  - Involvement: Involving 95% of  1 of 1 core.  - Grade group: II  - Percentage of pattern 4: 15%  - Perineural invasion: Absent     F. Prostate, right medial apex:  - Histologic Type: Acinar adenocarcinoma  - Lyon score: 3 + 4 = 7  - Involvement: Involving 50% of 1 of 1 core.  - Grade group: II  - Percentage of pattern 4: 25%  - Perineural invasion: Absent     G. Prostate, left lateral base:  - Benign prostatic tissue.  - Negative for malignancy.       -  Multiplex immunohistochemical stain performed with appropriate controls shows benign glands with intact basal layer cells staining for p63 and high molecular weight keratin with negative luminal racemase expression, supporting the diagnosis.     H. Prostate, left medial base:  - Benign prostatic tissue.  - Negative for malignancy.      -  Multiplex immunohistochemical stain performed with appropriate controls shows benign glands with intact basal layer cells staining for p63 and high molecular weight keratin with negative luminal racemase expression, supporting the diagnosis.     I. Prostate, left lateral mid:  - Histologic Type: Acinar adenocarcinoma  - Lyon score: 3 + 3 = 6  - Involvement: Involving 20% of 1 of 1 core.  - Grade group: I  - Perineural invasion: Absent     J. Prostate, left medial mid:  - Benign prostatic tissue.  - Negative for malignancy.      -  Multiplex immunohistochemical stain performed with appropriate controls shows benign glands with intact basal layer cells staining for p63 and high molecular weight keratin with negative luminal racemase expression, supporting the diagnosis.     K. Prostate, left lateral apex:  - Histologic Type: Acinar adenocarcinoma  - Parul score: 4 + 3  = 7  - Involvement: Involving 95% discontinuously of  1 of 1 core.  - Grade group: III  - Percentage of pattern 4: 90%  - Perineural invasion: Absent      L. Prostate, left medial apex :  - Histologic Type: Acinar adenocarcinoma  - Larue score: 4 + 3 = 7  - Involvement: Involving 90% discontinuously of  1 of 1 core.  - Grade group: III  - Percentage of pattern 4: 80%  - Perineural invasion: Absent      -  Multiplex immunohistochemical stain performed with appropriate controls shows malignant glands with loss of basal layer cells staining for p63 and high molecular weight keratin with luminal racemase expression, supporting the diagnosis.     9/6/2023 -  Cancer Staged    Staging form: Prostate, AJCC 8th Edition  - Clinical stage from 9/6/2023: Stage RUBY (cT1c, cN1, cM0, PSA: 31, Grade Group: 3) - Signed by Kilo Carter MD on 9/6/2023  Histopathologic type: Adenocarcinoma, NOS  Stage prefix: Initial diagnosis  Prostate specific antigen (PSA) range: 20 or greater  Larue primary pattern: 4  Larue secondary pattern: 3  Parul score: 7  Histologic grading system: 5 grade system  Location of positive needle core biopsies: Both sides       11/19/2023 - 1/29/2024 Radiation      Plan ID Energy Fractions Dose per Fraction (cGy) Dose Correction (cGy) Total Dose Delivered (cGy) Elapsed Days   CD Prost_pSV 6X-FFF 19 / 19 180 0 3,420 27   Whole Pel_SIB 6X-FFF 25 / 25 220 0 5,500 40      Treatment dates:  C1: 11/19/2023 - 1/29/2024         Review of Systems:  Review of Systems   Gastrointestinal:  Positive for diarrhea. Negative for abdominal pain, anal bleeding and blood in stool.   Endocrine: Negative for heat intolerance.   Genitourinary:  Positive for frequency and urgency. Negative for dysuria and hematuria.       Clinical Trial: no    IPSS Questionnaire (AUA-7):  Over the past month…    1)  How often have you had a sensation of not emptying your bladder completely after you finish urinating?  0 - Not at  all   2)  How often have you had to urinate again less than two hours after you finished urinating? 2 - Less than half the time   3)  How often have you found you stopped and started again several times when you urinated?  0 - Not at all   4) How difficult have you found it to postpone urination?  1 - Less than 1 time in 5   5) How often have you had a weak urinary stream?  1 - Less than 1 time in 5   6) How often have you had to push or strain to begin urination?  0 - Not at all   7) How many times did you most typically get up to urinate from the time you went to bed until the time you got up in the morning?  3 - 3 times   Total Score:  7       Teaching completed    Health Maintenance   Topic Date Due    PT PLAN OF CARE  Never done    RSV Vaccine Age 60+ Years (1 - 1-dose 60+ series) Never done    COVID-19 Vaccine (5 - 2023-24 season) 09/01/2024    Influenza Vaccine (1) 09/01/2024    Diabetic Foot Exam  10/17/2024    HEMOGLOBIN A1C  10/19/2024    Fall Risk  05/01/2025    Depression Screening  05/01/2025    Medicare Annual Wellness Visit (AWV)  05/01/2025    BMI: Adult  09/10/2025    DM Eye Exam  10/10/2025    Zoster Vaccine  Completed    Pneumococcal Vaccine: 65+ Years  Completed    RSV Vaccine age 0-20 Months  Aged Out    HIB Vaccine  Aged Out    IPV Vaccine  Aged Out    Hepatitis A Vaccine  Aged Out    Meningococcal ACWY Vaccine  Aged Out    HPV Vaccine  Aged Out     Patient Active Problem List   Diagnosis    Hypothyroidism    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, without long-term current use of insulin ([facility])    Loss of hearing    Primary osteoarthritis of left hip    Primary osteoarthritis of both knees    Male erectile dysfunction, unspecified    Vertigo    RBBB (right bundle branch block)    PVD (peripheral vascular disease) ([facility])    Pancreatic cyst    Left hip pain    Hammertoe    Seborrheic keratoses    Allergic rhinitis    Diabetes mellitus with peripheral vascular disease ([facility])     Onychomycosis    Nail hypertrophy    Stage 3a chronic kidney disease (HCC)    Callus    Mild protein-calorie malnutrition (HCC)    Chronic cough    Postnasal drip    Weight loss    Benign prostatic hyperplasia with nocturia    Hydronephrosis of left kidney    Prostate cancer (HCC)    Abnormal CT of the chest    Unsteady gait    Bronchiectasis without complication (HCC)    Diarrhea     Past Medical History:   Diagnosis Date    Abnormal loss of weight     last assessed: 4/24/13    Allergic     Arthritis     Diabetes mellitus (HCC)     Diverticulitis     Diverticulitis of colon     Diverticulosis     Prostate cancer (HCC)     Shingles     Skin cancer     Type 2 diabetes mellitus (HCC)      Past Surgical History:   Procedure Laterality Date    APPENDECTOMY      Age 16    CATARACT EXTRACTION      right 5/2002, left 12/2003    COLON SURGERY      for diverticulitis    COLONOSCOPY      done 5/5/2009, mild diverticulosis noted, recheck in 3 years    FL INJECTION LEFT HIP (NON ARTHROGRAM)  11/01/2022    FL INJECTION RIGHT HIP (NON ARTHROGRAM)  11/01/2022    INNER EAR SURGERY Left     Cochlear device implantation; resolved: Sep 2012    MOHS SURGERY Left     left wrist    [place] PLMT INTERSTITIAL DEV RADIAT TX PROSTATE 1/MULT N/A 10/24/2023    Procedure: INSERTION OF FIDUCIAL MARKER , SPACEOAR;  Surgeon: Gus Solis MD;  Location: [facility];  Service: Urology    SHOULDER SURGERY  05/2007    right rotator cuff     Family History   Problem Relation Age of Onset    Coronary artery disease Sister     Diabetes Sister     Breast cancer Sister 70    Diabetes Daughter      Social History     Socioeconomic History    Marital status: /Civil Union     Spouse name: Not on file    Number of children: Not on file    Years of education: Not on file    Highest education level: Not on file   Occupational History    Not on file   Tobacco Use    Smoking status: Never     Passive exposure: Never    Smokeless tobacco: Never   Vaping Use     Vaping status: Never Used   Substance and Sexual Activity    Alcohol use: Yes     Comment: social drinker as per [employer]    Drug use: No    Sexual activity: Not Currently     Partners: Female     Birth control/protection: Abstinence   Other Topics Concern    Not on file   Social History Narrative    Not on file     Social Determinants of Health     Financial Resource Strain: Low Risk  (3/28/2023)    Overall Financial Resource Strain (CARDIA)     Difficulty of Paying Living Expenses: Not hard at all   Food Insecurity: No Food Insecurity (4/29/2024)    Hunger Vital Sign     Worried About Running Out of Food in the Last Year: Never true     Ran Out of Food in the Last Year: Never true   Transportation Needs: No Transportation Needs (4/29/2024)    PRAPARE - Transportation     Lack of Transportation (Medical): No     Lack of Transportation (Non-Medical): No   Physical Activity: Not on file   Stress: Not on file   Social Connections: Not on file   Intimate Partner Violence: Not on file   Housing Stability: Low Risk  (4/29/2024)    Housing Stability Vital Sign     Unable to Pay for Housing in the Last Year: No     Number of Times Moved in the Last Year: 1     Homeless in the Last Year: No       Current Outpatient Medications:     amoxicillin (AMOXIL) 500 mg capsule, take 1 capsule by mouth every 8 hours until gone (Patient not taking: Reported on 5/1/2024), Disp: , Rfl:     chlorhexidine (PERIDEX) 0.12 % solution, , Disp: , Rfl:     hyoscyamine (ANASPAZ,LEVSIN) 0.125 MG tablet, Take 1 tablet (0.125 mg total) by mouth every 4 (four) hours as needed for cramping, Disp: 30 tablet, Rfl: 0    levothyroxine 75 mcg tablet, TAKE 1 TABLET(75 MCG) BY MOUTH DAILY, Disp: 90 tablet, Rfl: 3    meclizine (ANTIVERT) 25 mg tablet, Take 1 tablet (25 mg total) by mouth every 8 (eight) hours as needed for dizziness (Patient not taking: Reported on 5/16/2024), Disp: 30 tablet, Rfl: 0    oxyCODONE-acetaminophen (PERCOCET) 5-325 mg per  tablet, Take 1 tablet by mouth every 6 (six) hours as needed (Patient not taking: Reported on 8/28/2024), Disp: , Rfl:     tadalafil (CIALIS) 20 MG tablet, Take 1 tablet (20 mg total) by mouth daily as needed for erectile dysfunction (Patient not taking: Reported on 5/16/2024), Disp: 10 tablet, Rfl: 5    umeclidinium-vilanterol (Anoro Ellipta) 62.5-25 mcg/actuation inhaler, Inhale 1 puff daily, Disp: 60 blister, Rfl: 5  No Known Allergies  There were no vitals filed for this visit.

## 2024-09-12 ENCOUNTER — TELEPHONE (OUTPATIENT)
Dept: FAMILY MEDICINE CLINIC | Facility: CLINIC | Age: 89
End: 2024-09-12

## 2024-09-12 NOTE — TELEPHONE ENCOUNTER
----- Message from Giovanni Mancuso MD sent at 9/11/2024  5:27 PM EDT -----  HgA1C 7.8 stable. Continue low carb diet.

## 2024-09-12 NOTE — TELEPHONE ENCOUNTER
----- Message from Giovanni Mancuso MD sent at 9/11/2024  5:28 PM EDT -----  Stool study showed no bacteria or Cdiff.

## 2024-09-20 ENCOUNTER — TELEPHONE (OUTPATIENT)
Dept: PULMONOLOGY | Facility: CLINIC | Age: 89
End: 2024-09-20

## 2024-09-20 NOTE — TELEPHONE ENCOUNTER
Called patient to schedule appointment for the 6M FU from the Recall List and left a voicemail message.

## 2024-09-23 LAB — WBC SPEC QL GRAM STN: NORMAL

## 2024-10-09 ENCOUNTER — HOSPITAL ENCOUNTER (OUTPATIENT)
Dept: CT IMAGING | Facility: HOSPITAL | Age: 89
Discharge: HOME | End: 2024-10-09
Attending: INTERNAL MEDICINE
Payer: MEDICARE

## 2024-10-09 DIAGNOSIS — R93.89 ABNORMAL CT OF THE CHEST: ICD-10-CM

## 2024-10-09 PROCEDURE — 71250 CT THORAX DX C-: CPT

## 2024-10-29 ENCOUNTER — OFFICE VISIT (OUTPATIENT)
Dept: OBGYN CLINIC | Facility: HOSPITAL | Age: 89
End: 2024-10-29
Payer: MEDICARE

## 2024-10-29 VITALS
HEART RATE: 87 BPM | WEIGHT: 139 LBS | BODY MASS INDEX: 19.9 KG/M2 | HEIGHT: 70 IN | SYSTOLIC BLOOD PRESSURE: 96 MMHG | DIASTOLIC BLOOD PRESSURE: 61 MMHG

## 2024-10-29 DIAGNOSIS — M25.562 CHRONIC PAIN OF BOTH KNEES: ICD-10-CM

## 2024-10-29 DIAGNOSIS — M17.0 PRIMARY OSTEOARTHRITIS OF BOTH KNEES: Primary | ICD-10-CM

## 2024-10-29 DIAGNOSIS — G89.29 CHRONIC PAIN OF BOTH KNEES: ICD-10-CM

## 2024-10-29 DIAGNOSIS — M25.561 CHRONIC PAIN OF BOTH KNEES: ICD-10-CM

## 2024-10-29 PROCEDURE — 99213 OFFICE O/P EST LOW 20 MIN: CPT | Performed by: ORTHOPAEDIC SURGERY

## 2024-10-29 PROCEDURE — 20610 DRAIN/INJ JOINT/BURSA W/O US: CPT | Performed by: ORTHOPAEDIC SURGERY

## 2024-10-29 RX ORDER — BUPIVACAINE HYDROCHLORIDE 2.5 MG/ML
4 INJECTION, SOLUTION INFILTRATION; PERINEURAL
Status: COMPLETED | OUTPATIENT
Start: 2024-10-29 | End: 2024-10-29

## 2024-10-29 RX ORDER — BETAMETHASONE SODIUM PHOSPHATE AND BETAMETHASONE ACETATE 3; 3 MG/ML; MG/ML
12 INJECTION, SUSPENSION INTRA-ARTICULAR; INTRALESIONAL; INTRAMUSCULAR; SOFT TISSUE
Status: COMPLETED | OUTPATIENT
Start: 2024-10-29 | End: 2024-10-29

## 2024-10-29 RX ADMIN — BUPIVACAINE HYDROCHLORIDE 4 ML: 2.5 INJECTION, SOLUTION INFILTRATION; PERINEURAL at 14:00

## 2024-10-29 RX ADMIN — BETAMETHASONE SODIUM PHOSPHATE AND BETAMETHASONE ACETATE 12 MG: 3; 3 INJECTION, SUSPENSION INTRA-ARTICULAR; INTRALESIONAL; INTRAMUSCULAR; SOFT TISSUE at 14:00

## 2024-10-29 NOTE — PROGRESS NOTES
Assessment:   Diagnosis ICD-10-CM Associated Orders   1. Primary osteoarthritis of both knees  M17.0 Large joint arthrocentesis: bilateral knee     Injection Procedure Prior Authorization      2. Chronic pain of both knees  M25.561 Large joint arthrocentesis: bilateral knee    M25.562 Injection Procedure Prior Authorization    G89.29             Plan:  Diagnosis, treatment options and associated risks were discussed with the patient including no treatment, nonsurgical treatment and potential for surgical intervention.  The patient was given the opportunity to ask questions regarding each.   He was offered, accepted, performed injections of cortisone (RCC) to both knees today for symptomatic relief.  He tolerated both injections well.  Ice postinjection protocol advised.  Weightbearing and activity as tolerated.  We will petition his insurance for a one-shot Visco preparation in the form of Durolane for both knees upon approval.    To do next visit:  Return in about 3 months (around 1/29/2025) for re-check both knees for visco.    The above stated was discussed in layman's terms and the patient expressed understanding.  All questions were answered to the patient's satisfaction.       Scribe Attestation      I,:  Alex Cast am acting as a scribe while in the presence of the attending physician.:       I,:  Sylvester Badillo MD personally performed the services described in this documentation    as scribed in my presence.:               Subjective:   Bassam Tam is a 89 y.o. male who presents today for repeat evaluation of his bilateral knees due to return of pain.  He has known advanced osteoarthritis.  At his last visit 3 months ago both knees were injected with cortisone with fairly good relief however only lasting 2 months.  He has increased knee pain with weightbearing activities.  He is stiffness getting up with her prolonged sedentary positions he has difficulty ambulating stairs especially first thing  in the morning.  Pain scale 7/10 or greater at times.  He previously had Synvisc 1 injections 2021 without any appreciable relief however is interested in trying a different brand.      Review of systems negative unless otherwise specified in HPI  Review of Systems    Past Medical History:   Diagnosis Date    Abnormal loss of weight     last assessed: 4/24/13    Allergic     Arthritis     Diabetes mellitus (HCC)     Diverticulitis     Diverticulitis of colon     Diverticulosis     Prostate cancer (HCC)     Shingles     Skin cancer     Type 2 diabetes mellitus (HCC)        Past Surgical History:   Procedure Laterality Date    APPENDECTOMY      Age 16    CATARACT EXTRACTION      right 5/2002, left 12/2003    COLON SURGERY      for diverticulitis    COLONOSCOPY      done 5/5/2009, mild diverticulosis noted, recheck in 3 years    FL INJECTION LEFT HIP (NON ARTHROGRAM)  11/01/2022    FL INJECTION RIGHT HIP (NON ARTHROGRAM)  11/01/2022    INNER EAR SURGERY Left     Cochlear device implantation; resolved: Sep 2012    MOHS SURGERY Left     left wrist    [place] PLMT INTERSTITIAL DEV RADIAT TX PROSTATE 1/MULT N/A 10/24/2023    Procedure: INSERTION OF FIDUCIAL MARKER , SPACEOAR;  Surgeon: Gus Solis MD;  Location: [facility];  Service: Urology    SHOULDER SURGERY  05/2007    right rotator cuff       Family History   Problem Relation Age of Onset    Coronary artery disease Sister     Diabetes Sister     Breast cancer Sister 70    Diabetes Daughter        Social History     Occupational History    Not on file   Tobacco Use    Smoking status: Never     Passive exposure: Never    Smokeless tobacco: Never   Vaping Use    Vaping status: Never Used   Substance and Sexual Activity    Alcohol use: Yes     Comment: social drinker as per [employer]    Drug use: No    Sexual activity: Not Currently     Partners: Female     Birth control/protection: Abstinence         Current Outpatient Medications:     amoxicillin (AMOXIL) 500 mg capsule,  take 1 capsule by mouth every 8 hours until gone (Patient not taking: Reported on 5/1/2024), Disp: , Rfl:     chlorhexidine (PERIDEX) 0.12 % solution, , Disp: , Rfl:     hyoscyamine (ANASPAZ,LEVSIN) 0.125 MG tablet, Take 1 tablet (0.125 mg total) by mouth every 4 (four) hours as needed for cramping (Patient not taking: Reported on 9/11/2024), Disp: 30 tablet, Rfl: 0    levothyroxine 75 mcg tablet, TAKE 1 TABLET(75 MCG) BY MOUTH DAILY, Disp: 90 tablet, Rfl: 3    meclizine (ANTIVERT) 25 mg tablet, Take 1 tablet (25 mg total) by mouth every 8 (eight) hours as needed for dizziness (Patient not taking: Reported on 5/16/2024), Disp: 30 tablet, Rfl: 0    Multiple Vitamin (multivitamin) tablet, Take 1 tablet by mouth daily, Disp: , Rfl:     Multiple Vitamins-Minerals (MULTIVITAMIN GUMMIES ADULTS PO), Take 2 tablets by mouth daily, Disp: , Rfl:     oxyCODONE-acetaminophen (PERCOCET) 5-325 mg per tablet, Take 1 tablet by mouth every 6 (six) hours as needed (Patient not taking: Reported on 8/28/2024), Disp: , Rfl:     tadalafil (CIALIS) 20 MG tablet, Take 1 tablet (20 mg total) by mouth daily as needed for erectile dysfunction (Patient not taking: Reported on 5/16/2024), Disp: 10 tablet, Rfl: 5    umeclidinium-vilanterol (Anoro Ellipta) 62.5-25 mcg/actuation inhaler, Inhale 1 puff daily, Disp: 60 blister, Rfl: 5    No Known Allergies         Vitals:    10/29/24 1350   BP: 96/61   Pulse: 87       Body mass index is 19.94 kg/m².  Wt Readings from Last 3 Encounters:   10/29/24 63 kg (139 lb)   09/11/24 61.7 kg (136 lb)   09/10/24 63.2 kg (139 lb 6.4 oz)       Objective:                    Right Knee Exam     Muscle Strength   The patient has normal right knee strength.    Tenderness   The patient is experiencing tenderness in the medial joint line.    Range of Motion   Extension:  5   Flexion:  120 (With crepitation and stiffness)     Other   Erythema: absent  Sensation: normal  Effusion: no effusion present      Left Knee Exam  "    Muscle Strength   The patient has normal left knee strength.    Tenderness   The patient is experiencing tenderness in the medial joint line.    Range of Motion   Extension:  0   Flexion:  120 (With crepitation and stiffness)     Other   Erythema: absent  Sensation: normal  Effusion: no effusion present    Comments:    Both knees remain in mild varus alignment with bony enlargement medially.            Diagnostics, reviewed and taken today if performed as documented:    None performed          Procedures, if performed today:    Large joint arthrocentesis: bilateral knee  Universal Protocol:  Consent: Verbal consent obtained.  Risks and benefits: risks, benefits and alternatives were discussed  Consent given by: patient  Time out: Immediately prior to procedure a \"time out\" was called to verify the correct patient, procedure, equipment, support staff and site/side marked as required.  Timeout called at: 10/29/2024 2:05 PM.  Patient understanding: patient states understanding of the procedure being performed  Site marked: the operative site was marked  Patient identity confirmed: verbally with patient  Supporting Documentation  Indications: pain and diagnostic evaluation   Procedure Details  Location: knee - bilateral knee  Preparation: Patient was prepped and draped in the usual sterile fashion  Needle size: 22 G  Ultrasound guidance: no  Approach: anterolateral    Medications (Right): 12 mg betamethasone acetate-betamethasone sodium phosphate 6 (3-3) mg/mL; 4 mL bupivacaine 0.25 %Medications (Left): 12 mg betamethasone acetate-betamethasone sodium phosphate 6 (3-3) mg/mL; 4 mL bupivacaine 0.25 %   Patient tolerance: patient tolerated the procedure well with no immediate complications  Dressing:  Sterile dressing applied              Portions of the record may have been created with voice recognition software.  Occasional wrong word or \"sound a like\" substitutions may have occurred due to the inherent limitations " of voice recognition software.  Read the chart carefully and recognize, using context, where substitutions have occurred.

## 2024-11-07 ENCOUNTER — OFFICE VISIT (OUTPATIENT)
Dept: FAMILY MEDICINE CLINIC | Facility: CLINIC | Age: 89
End: 2024-11-07
Payer: MEDICARE

## 2024-11-07 VITALS
HEIGHT: 70 IN | TEMPERATURE: 97.6 F | BODY MASS INDEX: 19.33 KG/M2 | HEART RATE: 62 BPM | RESPIRATION RATE: 18 BRPM | OXYGEN SATURATION: 98 % | DIASTOLIC BLOOD PRESSURE: 60 MMHG | SYSTOLIC BLOOD PRESSURE: 98 MMHG | WEIGHT: 135 LBS

## 2024-11-07 DIAGNOSIS — R93.89 ABNORMAL CT OF THE CHEST: ICD-10-CM

## 2024-11-07 DIAGNOSIS — R26.81 UNSTEADY GAIT: ICD-10-CM

## 2024-11-07 DIAGNOSIS — Z23 ENCOUNTER FOR IMMUNIZATION: ICD-10-CM

## 2024-11-07 DIAGNOSIS — E11.40 CONTROLLED TYPE 2 DIABETES MELLITUS WITH DIABETIC NEUROPATHY, WITHOUT LONG-TERM CURRENT USE OF INSULIN (HCC): Primary | ICD-10-CM

## 2024-11-07 DIAGNOSIS — C61 PROSTATE CANCER (HCC): ICD-10-CM

## 2024-11-07 DIAGNOSIS — E03.9 ACQUIRED HYPOTHYROIDISM: ICD-10-CM

## 2024-11-07 PROBLEM — I73.9 PVD (PERIPHERAL VASCULAR DISEASE) (HCC): Status: RESOLVED | Noted: 2018-09-03 | Resolved: 2024-11-07

## 2024-11-07 PROBLEM — N18.31 STAGE 3A CHRONIC KIDNEY DISEASE (HCC): Status: RESOLVED | Noted: 2022-09-26 | Resolved: 2024-11-07

## 2024-11-07 PROCEDURE — G0008 ADMIN INFLUENZA VIRUS VAC: HCPCS

## 2024-11-07 PROCEDURE — 90662 IIV NO PRSV INCREASED AG IM: CPT

## 2024-11-07 PROCEDURE — 99214 OFFICE O/P EST MOD 30 MIN: CPT | Performed by: FAMILY MEDICINE

## 2024-11-07 NOTE — PROGRESS NOTES
Ambulatory Visit  Name: Bassam Tam      : 1935      MRN: [id number]  Encounter Provider: Giovanni Mancuso MD  Encounter Date: 2024   Encounter department: [facility]    Chief Complaint   Patient presents with    Follow-up     6 month      Health Maintenance   Topic Date Due    PT PLAN OF CARE  Never done    RSV Vaccine Age 60+ Years (1 - 1-dose 60+ series) Never done    Influenza Vaccine (1) 2024    COVID-19 Vaccine ( - - season) 2024    Diabetic Foot Exam  10/17/2024    HEMOGLOBIN A1C  2025    Fall Risk  2025    Medicare Annual Wellness Visit (AWV)  2025    DM Eye Exam  10/10/2025    Depression Screening  2025    Zoster Vaccine  Completed    Pneumococcal Vaccine: 65+ Years  Completed    RSV Vaccine age 0-20 Months  Aged Out    HIB Vaccine  Aged Out    IPV Vaccine  Aged Out    Hepatitis A Vaccine  Aged Out    Meningococcal ACWY Vaccine  Aged Out    HPV Vaccine  Aged Out       Assessment & Plan  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   Orders:    CBC; Future    Comprehensive metabolic panel; Future    Hemoglobin A1C; Future    TSH, 3rd generation with Free T4 reflex; Future    Acquired hypothyroidism  Continue levothyroxine 75mcg daily.   Orders:    CBC; Future    Comprehensive metabolic panel; Future    Hemoglobin A1C; Future    TSH, 3rd generation with Free T4 reflex; Future    Prostate cancer (HCC)  [facility] urology.   Orders:    CBC; Future    Comprehensive metabolic panel; Future    Hemoglobin A1C; Future    TSH, 3rd generation with Free T4 reflex; Future    Abnormal CT of the chest  [facility] pulmonary.        Unsteady gait    Orders:    Ambulatory Referral to Physical Therapy; Future    Encounter for immunization    Orders:    influenza vaccine, high-dose, PF 0.5 mL (Fluzone High  "Dose)      Depression Screening and Follow-up Plan: Patient was screened for depression during today's encounter. They screened negative with a PHQ-2 score of 0.        Flu shot yearly. Give flu shot today.   Got Covid19 shots and boosters.   Got PCV13 and pneumovax.   Got shingrix.   Got Tdap in 2023.   Fall precautions.   RTO in 6 months.         History of Present Illness     HPI    Pt is here with his wife.      Unsteady gait for a while.   Feels weakness of thighs from sitting to standing.   Wife would like him to see PT at [facility] again.      DM---9/2024 HgA1C 7.8 elevated. Diet control.   Denies hypoglycemia.   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 FU ophthalmologist regularly.   FU podiatry Dr. Mata in [place] Q 6 weeks.      Hypothyroidism---He is on levothyroxine 75mcg daily. 9/2024 TSH normal.      Prostate cancer---[facility] urology and hem/onc.   He got lupron and recent PSA <0.01 in 3/2024.   Will recheck PSA in 6 months.      Chronic cough for years. FU pulmonary.   Pt has postnasal drip. Does not use claritin and flonase because they affect his appetite.   Abnormal CT chest---Pt did CT chest on 10/2024. \"Nothing to suggest malignancy. A 6 mm nodule in the right lower lobe is stable since the abdomen CT from August 2024 with a new 4 mm right lower lobe nodule, infectious/inflammatory and not malignant, possibly related to chronic atypical mycobacterial infection.   Redemonstration of bronchiectasis, bronchial wall thickening, and tree-in-bud nodularity compatible with chronic atypical mycobacterial infection.\"      Live with wife. Does all ADL's. Plays golf/gardening regularly.   Denies recent falls. Felt unsteady.   Denies depression.         Review of Systems   Constitutional:  Negative for appetite change, chills and fever.   HENT:  Negative for congestion, ear pain, sinus pain and sore throat.    Eyes:  Negative for discharge and itching.   Respiratory:  Negative for apnea, cough, chest tightness, shortness of " "breath and wheezing.    Cardiovascular:  Negative for chest pain, palpitations and leg swelling.   Gastrointestinal:  Negative for abdominal pain, anal bleeding, constipation, diarrhea, nausea and vomiting.   Endocrine: Negative for cold intolerance, heat intolerance and polyuria.   Genitourinary:  Negative for difficulty urinating and dysuria.   Musculoskeletal:  Negative for arthralgias, back pain, gait problem and myalgias.   Skin:  Negative for rash.   Neurological:  Negative for dizziness and headaches.   Psychiatric/Behavioral:  Negative for agitation.            Objective     BP 98/60   Pulse 62   Temp 97.6 °F (36.4 °C) (Tympanic)   Resp 18   Ht 5' 10\" (1.778 m)   Wt 61.2 kg (135 lb)   SpO2 98%   BMI 19.37 kg/m²     Physical Exam  Constitutional:       Appearance: He is well-developed.   HENT:      Head: Normocephalic and atraumatic.   Eyes:      General:         Right eye: No discharge.         Left eye: No discharge.      Conjunctiva/sclera: Conjunctivae normal.   Cardiovascular:      Rate and Rhythm: Normal rate and regular rhythm.      Heart sounds: Normal heart sounds. No murmur heard.     No friction rub. No gallop.   Pulmonary:      Effort: Pulmonary effort is normal. No respiratory distress.      Breath sounds: Normal breath sounds. No wheezing or rales.   Abdominal:      General: Bowel sounds are normal. There is no distension.      Palpations: Abdomen is soft.      Tenderness: There is no abdominal tenderness. There is no guarding.   Musculoskeletal:         General: Normal range of motion.      Cervical back: Normal range of motion and neck supple.      Right lower leg: No edema.      Left lower leg: No edema.   Neurological:      Mental Status: He is alert.         "

## 2024-11-07 NOTE — ASSESSMENT & PLAN NOTE
[facility].   Orders:    CBC; Future    Comprehensive metabolic panel; Future    Hemoglobin A1C; Future    TSH, 3rd generation with Free T4 reflex; Future

## 2024-11-07 NOTE — ASSESSMENT & PLAN NOTE
Continue levothyroxine 75mcg daily.   Orders:    CBC; Future    Comprehensive metabolic panel; Future    Hemoglobin A1C; Future    TSH, 3rd generation with Free T4 reflex; Future

## 2024-11-07 NOTE — ASSESSMENT & PLAN NOTE
Lab Results   Component Value Date    EGFR 68 09/11/2024    EGFR 64 08/28/2024    EGFR 67 04/19/2024    CREATININE 0.98 09/11/2024    CREATININE 1.02 08/28/2024    CREATININE 0.99 04/19/2024

## 2024-11-07 NOTE — ASSESSMENT & PLAN NOTE
Lab Results   Component Value Date    HGBA1C 7.8 (H) 09/11/2024     Diet control.   Orders:    CBC; Future    Comprehensive metabolic panel; Future    Hemoglobin A1C; Future    TSH, 3rd generation with Free T4 reflex; Future

## 2024-11-13 ENCOUNTER — OFFICE VISIT (OUTPATIENT)
Dept: UROLOGY | Facility: CLINIC | Age: 89
End: 2024-11-13
Payer: MEDICARE

## 2024-11-13 VITALS
SYSTOLIC BLOOD PRESSURE: 98 MMHG | OXYGEN SATURATION: 98 % | DIASTOLIC BLOOD PRESSURE: 60 MMHG | BODY MASS INDEX: 19.76 KG/M2 | HEART RATE: 90 BPM | HEIGHT: 70 IN | WEIGHT: 138 LBS

## 2024-11-13 DIAGNOSIS — C61 PROSTATE CANCER (HCC): Primary | ICD-10-CM

## 2024-11-13 PROCEDURE — 99213 OFFICE O/P EST LOW 20 MIN: CPT

## 2024-11-13 NOTE — PROGRESS NOTES
Office Visit- Urology  Bassam Tam 1935 MRN: [id number]      Assessment/Discussion/Plan    89 y.o. male managed by     Prostate cancer  -History of Torrey 4 posterior equal 7 prostate cancer  -Status post 1 dose of Lupron 45 mg in September 2023 and completion of radiation therapy to prostate and left pelvic lymph node in January 2024  -Postradiation PSAs remain undetectable with last measurement measuring less than 0.008 in September 2024  -Patient has deferred any additional ADT  -Continue with observation of PSA obtain PSA in 6 months with follow-up at that point in time    Chief Complaint:   Bassam is a 89 y.o. male presenting to the office for a follow up visit regarding prostate cancer        Subjective    Patient is a 89-year-old male with a history of prostate cancer who presents the office today for follow-up he has a history of Parul 4 posterior equal 7 prostate cancer and had a dose of Lupron 45 mg in September 2023.  He completed radiation therapy to the prostate and pelvic lymph node in June 2024 postradiation his PSA has been undetectable with last measurement less than 0.008 in September 2024.  He states that he has persistent decreased strength.  He has also been trying  to gain weight but has been unable to do so.  Denies bothersome urinary tract symptoms no dysuria, gross hematuria, flank pain      AUA SYMPTOM SCORE      Flowsheet Row Most Recent Value   AUA SYMPTOM SCORE    How often have you had a sensation of not emptying your bladder completely after you finished urinating? 0 (P)     How often have you had to urinate again less than two hours after you finished urinating? 3 (P)     How often have you found you stopped and started again several times when you urinate? 1 (P)     How often have you found it difficult to postpone urination? 3 (P)     How often have you had a weak urinary stream? 1 (P)     How often have you had to push or strain to begin urination? 0 (P)     How many times  did you most typically get up to urinate from the time you went to bed at night until the time you got up in the morning? 5 (P)     Quality of Life: If you were to spend the rest of your life with your urinary condition just the way it is now, how would you feel about that? 3 (P)     AUA SYMPTOM SCORE 13 (P)              ROS:   Review of Systems   Constitutional: Negative.  Negative for chills, fatigue and fever.   HENT: Negative.     Respiratory:  Negative for shortness of breath.    Cardiovascular:  Negative for chest pain.   Gastrointestinal: Negative.  Negative for abdominal pain.   Endocrine: Negative.    Musculoskeletal: Negative.    Skin: Negative.    Neurological: Negative.  Negative for dizziness and light-headedness.   Hematological: Negative.    Psychiatric/Behavioral: Negative.           Past Medical History  Past Medical History:   Diagnosis Date    Abnormal loss of weight     last assessed: 4/24/13    Allergic     Arthritis     Diabetes mellitus (HCC)     Diverticulitis     Diverticulitis of colon     Diverticulosis     Prostate cancer (HCC)     Shingles     Skin cancer     Type 2 diabetes mellitus (HCC)        Past Surgical History  Past Surgical History:   Procedure Laterality Date    APPENDECTOMY      Age 16    CATARACT EXTRACTION      right 5/2002, left 12/2003    COLON SURGERY      for diverticulitis    COLONOSCOPY      done 5/5/2009, mild diverticulosis noted, recheck in 3 years    FL INJECTION LEFT HIP (NON ARTHROGRAM)  11/01/2022    FL INJECTION RIGHT HIP (NON ARTHROGRAM)  11/01/2022    INNER EAR SURGERY Left     Cochlear device implantation; resolved: Sep 2012    MOHS SURGERY Left     left wrist    [place] PLMT INTERSTITIAL DEV RADIAT TX PROSTATE 1/MULT N/A 10/24/2023    Procedure: INSERTION OF FIDUCIAL MARKER , SPACEOAR;  Surgeon: Gus Solis MD;  Location: [facility];  Service: Urology    SHOULDER SURGERY  05/2007    right rotator cuff       Past Family History  Family History   Problem  Relation Age of Onset    Coronary artery disease Sister     Diabetes Sister     Breast cancer Sister 70    Diabetes Daughter        Past Social history  Social History     Socioeconomic History    Marital status: /Civil Union     Spouse name: Not on file    Number of children: Not on file    Years of education: Not on file    Highest education level: Not on file   Occupational History    Not on file   Tobacco Use    Smoking status: Never     Passive exposure: Never    Smokeless tobacco: Never   Vaping Use    Vaping status: Never Used   Substance and Sexual Activity    Alcohol use: Yes     Comment: social drinker as per [employer]    Drug use: Never    Sexual activity: Not Currently     Partners: Female     Birth control/protection: Abstinence   Other Topics Concern    Not on file   Social History Narrative    Not on file     Social Drivers of Health     Financial Resource Strain: Low Risk  (3/28/2023)    Overall Financial Resource Strain (CARDIA)     Difficulty of Paying Living Expenses: Not hard at all   Food Insecurity: No Food Insecurity (4/29/2024)    Nursing - Inadequate Food Risk Classification     Worried About Running Out of Food in the Last Year: Never true     Ran Out of Food in the Last Year: Never true     Ran Out of Food in the Last Year: Not on file   Transportation Needs: No Transportation Needs (4/29/2024)    PRAPARE - Transportation     Lack of Transportation (Medical): No     Lack of Transportation (Non-Medical): No   Physical Activity: Not on file   Stress: Not on file   Social Connections: Not on file   Intimate Partner Violence: Not on file   Housing Stability: Low Risk  (4/29/2024)    Housing Stability Vital Sign     Unable to Pay for Housing in the Last Year: No     Number of Times Moved in the Last Year: 1     Homeless in the Last Year: No       Current Medications  Current Outpatient Medications   Medication Sig Dispense Refill    levothyroxine 75 mcg tablet TAKE 1 TABLET(75 MCG) BY  "MOUTH DAILY 90 tablet 3    Multiple Vitamins-Minerals (MULTIVITAMIN GUMMIES ADULTS PO) Take 2 tablets by mouth daily      umeclidinium-vilanterol (Anoro Ellipta) 62.5-25 mcg/actuation inhaler Inhale 1 puff daily (Patient not taking: Reported on 11/13/2024) 60 blister 5     No current facility-administered medications for this visit.       Allergies  No Known Allergies    OBJECTIVE    Vitals   Vitals:    11/13/24 0930   BP: 98/60   BP Location: Left arm   Patient Position: Sitting   Cuff Size: Adult   Pulse: 90   SpO2: 98%   Weight: 62.6 kg (138 lb)   Height: 5' 10\" (1.778 m)       PVR:    Physical Exam  Constitutional:       General: He is not in acute distress.     Appearance: Normal appearance. He is normal weight. He is not ill-appearing or toxic-appearing.   HENT:      Head: Normocephalic and atraumatic.   Eyes:      Conjunctiva/sclera: Conjunctivae normal.   Cardiovascular:      Rate and Rhythm: Normal rate.   Pulmonary:      Effort: Pulmonary effort is normal. No respiratory distress.   Skin:     General: Skin is warm and dry.   Neurological:      General: No focal deficit present.      Mental Status: He is alert and oriented to person, place, and time.      Cranial Nerves: No cranial nerve deficit.   Psychiatric:         Mood and Affect: Mood normal.         Behavior: Behavior normal.         Thought Content: Thought content normal.          Labs:     Lab Results   Component Value Date    PSA <0.008 09/06/2024    PSA <0.01 03/06/2024    PSA 25.48 (H) 06/14/2023    PSA 31.01 (H) 05/31/2023     Lab Results   Component Value Date    CREATININE 0.98 09/11/2024      Lab Results   Component Value Date    HGBA1C 7.8 (H) 09/11/2024     Lab Results   Component Value Date    GLUCOSE 136 08/14/2015    CALCIUM 9.4 09/11/2024     08/14/2015    K 3.9 09/11/2024    CO2 29 09/11/2024     09/11/2024    BUN 20 09/11/2024    CREATININE 0.98 09/11/2024       I have personally reviewed all pertinent lab results and " reviewed with patient    Imaging       Joseph Buchanan PA-C  Date: 11/13/2024 Time: 9:34 AM  [facility]    This note was written using fluency dictation software. Please excuse any resulting minor grammatical errors.

## 2025-01-15 DIAGNOSIS — E03.9 ACQUIRED HYPOTHYROIDISM: ICD-10-CM

## 2025-01-15 RX ORDER — LEVOTHYROXINE SODIUM 75 UG/1
75 TABLET ORAL DAILY
Qty: 90 TABLET | Refills: 1 | Status: SHIPPED | OUTPATIENT
Start: 2025-01-15

## 2025-01-20 ENCOUNTER — OFFICE VISIT (OUTPATIENT)
Dept: PULMONOLOGY | Facility: CLINIC | Age: OVER 89
End: 2025-01-20
Payer: MEDICARE

## 2025-01-20 VITALS
HEIGHT: 70 IN | WEIGHT: 144 LBS | DIASTOLIC BLOOD PRESSURE: 62 MMHG | BODY MASS INDEX: 20.62 KG/M2 | SYSTOLIC BLOOD PRESSURE: 106 MMHG | OXYGEN SATURATION: 96 % | TEMPERATURE: 97 F | HEART RATE: 85 BPM

## 2025-01-20 DIAGNOSIS — J47.9 BRONCHIECTASIS WITHOUT COMPLICATION (HCC): Primary | ICD-10-CM

## 2025-01-20 PROCEDURE — 99214 OFFICE O/P EST MOD 30 MIN: CPT | Performed by: INTERNAL MEDICINE

## 2025-01-20 PROCEDURE — G2211 COMPLEX E/M VISIT ADD ON: HCPCS | Performed by: INTERNAL MEDICINE

## 2025-01-20 RX ORDER — IPRATROPIUM BROMIDE AND ALBUTEROL SULFATE 2.5; .5 MG/3ML; MG/3ML
3 SOLUTION RESPIRATORY (INHALATION) 2 TIMES DAILY
Qty: 180 ML | Refills: 3 | Status: SHIPPED | OUTPATIENT
Start: 2025-01-20

## 2025-01-20 NOTE — ASSESSMENT & PLAN NOTE
Bassam is doing well despite his symptoms of cough with mucus most mornings.  He was unable to tolerate Anoro due to loss of appetite.  Will trial nebulizer with DuoNebs twice a day and assess his response to treatment.  I reviewed his most recent CAT scan which showed stable appearing subcentimeter lung nodules and mild bronchiectasis.  Will continue surveillance scans next 1 in October.  He is up-to-date on his vaccines and I reviewed his most recent blood work.    Orders:    Nebulizer    Nebulizer Supplies    ipratropium-albuterol (DUO-NEB) 0.5-2.5 mg/3 mL nebulizer solution; Take 3 mL by nebulization 2 (two) times a day

## 2025-01-20 NOTE — PROGRESS NOTES
"Name: Bassam Tam      : 1935      MRN: [id number]  Encounter Provider: Yaneth Cuba DO  Encounter Date: 2025   Encounter department: [facility]  :  Assessment & Plan  Bronchiectasis without complication (HCC)  Bassam is doing well despite his symptoms of cough with mucus most mornings.  He was unable to tolerate Anoro due to loss of appetite.  Will trial nebulizer with DuoNebs twice a day and assess his response to treatment.  I reviewed his most recent CAT scan which showed stable appearing subcentimeter lung nodules and mild bronchiectasis.  Will continue surveillance scans next 1 in October.  He is up-to-date on his vaccines and I reviewed his most recent blood work.    Orders:    Nebulizer    Nebulizer Supplies    ipratropium-albuterol (DUO-NEB) 0.5-2.5 mg/3 mL nebulizer solution; Take 3 mL by nebulization 2 (two) times a day        History of Present Illness   primary symptoms  Associated symptoms include coughing and myalgias. Pertinent negatives include no chest pain, fever, headaches or sore throat.     Bassam Tam is a 89 y.o. male who presents for follow-up of his bronchiectasis.  He feels his breathing is stable he is very active denies any limitations with his breathing.  He does complain of some hoarseness and dry cough which is occasionally productive of mucus.  History obtained from: patient    Review of Systems   Constitutional:  Negative for appetite change and fever.   HENT:  Positive for rhinorrhea and sneezing. Negative for ear pain, postnasal drip, sore throat and trouble swallowing.    Respiratory:  Positive for cough.    Cardiovascular:  Negative for chest pain.   Musculoskeletal:  Positive for myalgias.   Neurological:  Negative for headaches.     Medical History Reviewed by provider this encounter:     .     Objective   /62   Pulse 85   Temp (!) 97 °F (36.1 °C) (Tympanic Core)   Ht 5' 10\" (1.778 m)   Wt 65.3 kg (144 lb)   SpO2 96% "   BMI 20.66 kg/m²      Physical Exam  Constitutional:       Appearance: He is well-developed.   HENT:      Head: Normocephalic and atraumatic.   Eyes:      Pupils: Pupils are equal, round, and reactive to light.   Cardiovascular:      Rate and Rhythm: Normal rate and regular rhythm.      Heart sounds: No murmur heard.  Pulmonary:      Effort: Pulmonary effort is normal. No respiratory distress.      Breath sounds: Normal breath sounds. No wheezing or rales.   Abdominal:      Palpations: Abdomen is soft.   Musculoskeletal:      Cervical back: Normal range of motion and neck supple.   Skin:     General: Skin is warm and dry.   Neurological:      Mental Status: He is alert and oriented to person, place, and time.         Administrative Statements   I have spent a total time of 30 minutes in caring for this patient on the day of the visit/encounter including Diagnostic results, Prognosis, Risks and benefits of tx options, Documenting in the medical record, Reviewing / ordering tests, medicine, procedures  , and Obtaining or reviewing history  .

## 2025-01-21 ENCOUNTER — DOCUMENTATION (OUTPATIENT)
Dept: PULMONOLOGY | Facility: CLINIC | Age: OVER 89
End: 2025-01-21

## 2025-01-21 NOTE — PROGRESS NOTES
Order placed via parachute for [facility] for nebulizer

## 2025-01-26 LAB
DME PARACHUTE DELIVERY DATE ACTUAL: NORMAL
DME PARACHUTE DELIVERY DATE REQUESTED: NORMAL
DME PARACHUTE ITEM DESCRIPTION: NORMAL
DME PARACHUTE ORDER STATUS: NORMAL
DME PARACHUTE SUPPLIER NAME: NORMAL
DME PARACHUTE SUPPLIER PHONE: NORMAL

## 2025-02-04 ENCOUNTER — PROCEDURE VISIT (OUTPATIENT)
Dept: OBGYN CLINIC | Facility: HOSPITAL | Age: OVER 89
End: 2025-02-04
Payer: MEDICARE

## 2025-02-04 VITALS — HEIGHT: 70 IN | BODY MASS INDEX: 20.62 KG/M2 | WEIGHT: 144 LBS

## 2025-02-04 DIAGNOSIS — M25.562 CHRONIC PAIN OF BOTH KNEES: ICD-10-CM

## 2025-02-04 DIAGNOSIS — G89.29 CHRONIC PAIN OF BOTH KNEES: ICD-10-CM

## 2025-02-04 DIAGNOSIS — M25.561 CHRONIC PAIN OF BOTH KNEES: ICD-10-CM

## 2025-02-04 DIAGNOSIS — M17.0 PRIMARY OSTEOARTHRITIS OF BOTH KNEES: Primary | ICD-10-CM

## 2025-02-04 PROCEDURE — 20610 DRAIN/INJ JOINT/BURSA W/O US: CPT | Performed by: ORTHOPAEDIC SURGERY

## 2025-02-04 RX ORDER — LIDOCAINE HYDROCHLORIDE 10 MG/ML
4 INJECTION, SOLUTION INFILTRATION; PERINEURAL
Status: COMPLETED | OUTPATIENT
Start: 2025-02-04 | End: 2025-02-04

## 2025-02-04 RX ADMIN — LIDOCAINE HYDROCHLORIDE 4 ML: 10 INJECTION, SOLUTION INFILTRATION; PERINEURAL at 10:00

## 2025-02-04 NOTE — PROGRESS NOTES
Assessment:  Assessment & Plan  Primary osteoarthritis of both knees    Orders:    Large joint arthrocentesis: bilateral knee    Chronic pain of both knees    Orders:    Large joint arthrocentesis: bilateral knee      Plan:  Diagnosis, treatment options and associated risks were discussed with the patient including no treatment, nonsurgical treatment and potential for surgical intervention.  The patient was given the opportunity to ask questions regarding each.   Quality of life decision to pursue elective TKA however due to his elevated A1C, he is not an elective joint replacement candidate  Both of his knees were injected with the Durolane Visco product today.  He tolerated both injections well.  Ice and postinjection protocol advised.  Weightbearing and activity as tolerated.    To do next visit:  Return in about 3 months (around 5/4/2025) for re-check.    The above stated was discussed in layman's terms and the patient expressed understanding.  All questions were answered to the patient's satisfaction.       Scribe Attestation      I,:  Alex Cast am acting as a scribe while in the presence of the attending physician.:       I,:  Sylvester Badillo MD personally performed the services described in this documentation    as scribed in my presence.:               Subjective:   Bassam Tam is a 89 y.o. male who presents today for repeat evaluation of his bilateral knees, known osteoarthritis.  He has return of pain.  He is increased knee pain when getting up after prolonged sedentary positions, stairs as well as deep flexion kneeling positions.  He has no issues when walking his dog which she does twice a day.  Pain scale 7/10 or greater at times.  He is scheduled to start formal physical therapy for general strengthening exercises.  He previously had injections of cortisone.  Years ago he had Synvisc 1 with minimal lasting relief.  He returns today for the administration of the Durolane Visco  product.      Review of systems negative unless otherwise specified in HPI  Review of Systems    Past Medical History:   Diagnosis Date    Abnormal loss of weight     last assessed: 4/24/13    Allergic     Arthritis     Cataract     Diabetes mellitus (HCC)     Diverticulitis     Diverticulitis of colon     Diverticulosis     Lung nodule     Prostate cancer (HCC)     Shingles     Skin cancer     Type 2 diabetes mellitus (HCC)        Past Surgical History:   Procedure Laterality Date    APPENDECTOMY      Age 16    CATARACT EXTRACTION      right 5/2002, left 12/2003    COLON SURGERY      for diverticulitis    COLONOSCOPY      done 5/5/2009, mild diverticulosis noted, recheck in 3 years    FL INJECTION LEFT HIP (NON ARTHROGRAM)  11/01/2022    FL INJECTION RIGHT HIP (NON ARTHROGRAM)  11/01/2022    INNER EAR SURGERY Left     Cochlear device implantation; resolved: Sep 2012    MOHS SURGERY Left     left wrist    IA PLMT INTERSTITIAL DEV RADIAT TX PROSTATE 1/MULT N/A 10/24/2023    Procedure: INSERTION OF FIDUCIAL MARKER , SPACEOAR;  Surgeon: Gus Solis MD;  Location: [facility];  Service: Urology    SHOULDER SURGERY  05/2007    right rotator cuff       Family History   Problem Relation Age of Onset    Coronary artery disease Sister     Diabetes Sister     Breast cancer Sister 70    Lung cancer Sister     Diabetes Daughter        Social History     Occupational History    Not on file   Tobacco Use    Smoking status: Never     Passive exposure: Never    Smokeless tobacco: Never   Vaping Use    Vaping status: Never Used   Substance and Sexual Activity    Alcohol use: Yes     Comment: social drinker as per [employer]    Drug use: No    Sexual activity: Not Currently     Partners: Female     Birth control/protection: Abstinence         Current Outpatient Medications:     ipratropium-albuterol (DUO-NEB) 0.5-2.5 mg/3 mL nebulizer solution, Take 3 mL by nebulization 2 (two) times a day, Disp: 180 mL, Rfl: 3    levothyroxine 75  "mcg tablet, TAKE 1 TABLET(75 MCG) BY MOUTH DAILY, Disp: 90 tablet, Rfl: 1    Multiple Vitamins-Minerals (MULTIVITAMIN GUMMIES ADULTS PO), Take 2 tablets by mouth daily, Disp: , Rfl:     No Known Allergies       There were no vitals filed for this visit.    Body mass index is 20.66 kg/m².  Wt Readings from Last 3 Encounters:   02/04/25 65.3 kg (144 lb)   01/20/25 65.3 kg (144 lb)   11/13/24 62.6 kg (138 lb)       Objective:                    Right Knee Exam     Muscle Strength   The patient has normal right knee strength.    Tenderness   The patient is experiencing tenderness in the medial joint line.    Range of Motion   Extension:  5   Flexion:  120 (With crepitation and stiffness)     Other   Erythema: absent  Sensation: normal  Effusion: no effusion present      Left Knee Exam     Muscle Strength   The patient has normal left knee strength.    Tenderness   The patient is experiencing tenderness in the medial joint line.    Range of Motion   Extension:  0   Flexion:  120 (With crepitation and stiffness)     Other   Erythema: absent  Sensation: normal  Effusion: no effusion present    Comments:    Both knees remain in mild varus alignment with bony enlargement medially.            Diagnostics, reviewed and taken today if performed as documented:    None performed        Procedures, if performed today:    Large joint arthrocentesis: bilateral knee  Universal Protocol:  Consent: Verbal consent obtained.  Risks and benefits: risks, benefits and alternatives were discussed  Consent given by: patient  Time out: Immediately prior to procedure a \"time out\" was called to verify the correct patient, procedure, equipment, support staff and site/side marked as required.  Timeout called at: 2/4/2025 10:12 AM.  Patient understanding: patient states understanding of the procedure being performed  Site marked: the operative site was marked  Patient identity confirmed: verbally with patient  Supporting Documentation  Indications: " "pain and diagnostic evaluation   Procedure Details  Location: knee - bilateral knee  Preparation: Patient was prepped and draped in the usual sterile fashion  Needle size: 22 G  Ultrasound guidance: no  Approach: supralateral.    Medications (Right): 4 mL lidocaine 1 %; 3 mL sodium hyaluronate 60 MG/3MLMedications (Left): 4 mL lidocaine 1 %; 3 mL sodium hyaluronate 60 MG/3ML   Patient tolerance: patient tolerated the procedure well with no immediate complications  Dressing:  Sterile dressing applied          Portions of the record may have been created with voice recognition software.  Occasional wrong word or \"sound a like\" substitutions may have occurred due to the inherent limitations of voice recognition software.  Read the chart carefully and recognize, using context, where substitutions have occurred.  "

## 2025-02-25 ENCOUNTER — APPOINTMENT (OUTPATIENT)
Dept: LAB | Facility: HOSPITAL | Age: OVER 89
End: 2025-02-25
Payer: MEDICARE

## 2025-02-25 DIAGNOSIS — C61 PROSTATE CANCER (HCC): ICD-10-CM

## 2025-02-25 LAB — PSA SERPL-MCNC: <0.008 NG/ML (ref 0–4)

## 2025-02-25 PROCEDURE — 84153 ASSAY OF PSA TOTAL: CPT

## 2025-02-25 PROCEDURE — 36415 COLL VENOUS BLD VENIPUNCTURE: CPT

## 2025-03-12 ENCOUNTER — OFFICE VISIT (OUTPATIENT)
Dept: RADIATION ONCOLOGY | Facility: CLINIC | Age: OVER 89
End: 2025-03-12
Attending: RADIOLOGY
Payer: MEDICARE

## 2025-03-12 VITALS
SYSTOLIC BLOOD PRESSURE: 99 MMHG | TEMPERATURE: 97.2 F | OXYGEN SATURATION: 98 % | HEIGHT: 70 IN | HEART RATE: 82 BPM | DIASTOLIC BLOOD PRESSURE: 69 MMHG | BODY MASS INDEX: 20.5 KG/M2 | RESPIRATION RATE: 17 BRPM | WEIGHT: 143.2 LBS

## 2025-03-12 DIAGNOSIS — C61 PROSTATE CANCER (HCC): Primary | ICD-10-CM

## 2025-03-12 PROCEDURE — 99211 OFF/OP EST MAY X REQ PHY/QHP: CPT | Performed by: RADIOLOGY

## 2025-03-12 PROCEDURE — 99213 OFFICE O/P EST LOW 20 MIN: CPT | Performed by: RADIOLOGY

## 2025-03-12 NOTE — PROGRESS NOTES
Follow-up Visit   Name: Bassam Tam      : 1935      MRN: [id number]  Encounter Provider: Kilo Carter MD  Encounter Date: 3/12/2025   Encounter department: [facility] RADIATION ONCOLOGY  :  Assessment & Plan  Prostate cancer (HCC)    Orders:  •  PSA Total, Diagnostic; Future  Bassam Tam is a 89 y.o. year old male with clinical stage RUBY, cT1c, cN1, M0 Parul score 4+3=7 prostate adenocarcinoma diagnosed after an elevated PSA of 31.01 May 31, 2023 that was repeated 2023 and was 25.48.  He reports this was his first PSA level.  He has a long history of BPH.  He had his first prostate biopsies on 2023 that revealed Parul score 4+3=7 disease in addition to Parul score 3+4=7 and 3+3=6 disease in multiple biopsies bilaterally. There were a total of 10 out of 12 biopsy cores that were positive.  Patient was seen by Dr. Ramirez and PSMA PET/CT was ordered and performed on 2023.  This revealed heterogeneous multifocal tracer activity involving the prostate gland consistent with malignancy.  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  We recommended radiation to the whole pelvis along with a boost to the lymph node and his prostate primary with a total of 9 weeks/44 fractions of radiation therapy.  We also recommended androgen deprivation therapy 2 to 3 months prior to the start of radiation therapy and he did have Lupron 2023.  He completed radiation therapy to a total dose of 7920 cGy along with 5500 cGy to the left pelvic lymph node on 2024.     He returns for follow-up examination today and is doing well.  PSA level went down to less than 0.01 NG/mL on 2024 and his PSA level decreased further on 2024 to less than 0.008 NG/mL.  His most recent PSA level 2025  remains at less than 0.008 NG/mL. We discussed with him and his wife that this indicates an excellent response to treatment.  Due to the side effects of Lupron and his PSA being nearly undetectable, decision was made to discontinue Lupron during his last urology visit 2024.  He has urology appointment scheduled for May 14, 2025.  He will return here for follow-up in 6 months with a repeat PSA level.      History of Present Illness   Chief Complaint   Patient presents with   • Follow-up   Pertinent Medical History     Bassam Tam 1935 is a 89 y.o. male With history of Leilani 7 4+3 prostate cancer. He completed definitive radiation on 24.  He was last seen in radiation on 2024.  He returns for follow up.        PSA   Latest Ref Rng 0.000 - 4.000 ng/mL   3/6/2024 <0.01    2024 <0.008    2025 <0.008      [facility]  He is doing well. Will continue observation PSA and follow up 6 months.     He is seen for follow-up today with his wife. He reports no significant fatigue and is back to his baseline. He a had COVID infection during treatment but none since that time. He reports no pain and no dysuria. He has no hematuria. He has stable nocturia now 2 times per night which is his baseline.  His diarrhea after radiation therapy had resolved by early 2024.  He started having diarrhea again in late August which resolved.  He had lost about 6 pounds last August and September and is now gained 5 pounds and is back to his baseline 143 to 144 pounds.  He has had1 Lupron injection and reports no hot flashes.  He does report decrease in size of his testicles and penis.  Decision was made to discontinue Lupron on 2024 when he was seen by urology.  He remains active with walking his dog twice a day three quarters of a mile as long as the weather is good.    Upcomin25 Dr. Badillo for bilateral knee steroid injection  25 Dr. Mancuso   25 Urology        Oncology History   Cancer Staging   Prostate cancer ([facility])  Staging form: Prostate, AJCC 8th Edition  - Clinical stage from 9/6/2023: Stage RUBY (cT1c, cN1, cM0, PSA: 31, Grade Group: 3) - Signed by Kilo Carter MD on 9/6/2023  Histopathologic type: Adenocarcinoma, NOS  Stage prefix: Initial diagnosis  Prostate specific antigen (PSA) range: 20 or greater  Parul primary pattern: 4  Parul secondary pattern: 3  Parul score: 7  Histologic grading system: 5 grade system  Location of positive needle core biopsies: Both sides  Oncology History   Prostate cancer ([facility])   7/31/2023 Initial Diagnosis    Prostate cancer ([facility])     7/31/2023 Biopsy    A. Prostate, right lateral base:  - Histologic Type: Acinar adenocarcinoma  - Parul score: 3 + 4 = 7  - Involvement: Involving 10% and 10% of  2 of 2 cores.  - Grade group: II  - Percentage of pattern 4: approximately 10%  - Perineural invasion: Absent     -  Multiplex immunohistochemical stain performed with appropriate controls shows malignant glands with loss of basal layer cells staining for p63 and high molecular weight keratin with luminal racemase expression, supporting the diagnosis.     B. Prostate, right medial base:  - Histologic Type: Acinar adenocarcinoma  - [place] score: 3 + 4 = 7  - Involvement: Involving 95% of 1 of 2 core.  - Grade group: II  - Percentage of pattern 4: 5-10%  - Perineural invasion: Absent      -  Multiplex immunohistochemical stain performed with appropriate controls on block B2 shows benign glands with intact basal layer cells staining for p63 and high molecular weight keratin with negative luminal racemase expression, supporting the diagnosis.     C. Prostate, right lateral mid:  - Histologic Type: Acinar adenocarcinoma  - [place] score: 3 + 4 = 7  - Involvement: Involving 90% of 1 of 1 core.  - Grade group: II  - Percentage of pattern 4: 15%  - Perineural invasion: Absent     D. Prostate, right medial mis:  - Histologic  Type: Acinar adenocarcinoma  - [place] score: 3 + 4 = 7  - Involvement: Involving 60% of  1 of 1 core.  - Grade group: II  - Percentage of pattern 4: 30%  - Perineural invasion: Absent     E. Prostate, right lateral apex:  - Histologic Type: Acinar adenocarcinoma  - Parul score: 3 + 4 = 7  - Involvement: Involving 95% of  1 of 1 core.  - Grade group: II  - Percentage of pattern 4: 15%  - Perineural invasion: Absent     F. Prostate, right medial apex:  - Histologic Type: Acinar adenocarcinoma  - [place] score: 3 + 4 = 7  - Involvement: Involving 50% of 1 of 1 core.  - Grade group: II  - Percentage of pattern 4: 25%  - Perineural invasion: Absent     G. Prostate, left lateral base:  - Benign prostatic tissue.  - Negative for malignancy.       -  Multiplex immunohistochemical stain performed with appropriate controls shows benign glands with intact basal layer cells staining for p63 and high molecular weight keratin with negative luminal racemase expression, supporting the diagnosis.     H. Prostate, left medial base:  - Benign prostatic tissue.  - Negative for malignancy.      -  Multiplex immunohistochemical stain performed with appropriate controls shows benign glands with intact basal layer cells staining for p63 and high molecular weight keratin with negative luminal racemase expression, supporting the diagnosis.     I. Prostate, left lateral mid:  - Histologic Type: Acinar adenocarcinoma  - [place] score: 3 + 3 = 6  - Involvement: Involving 20% of 1 of 1 core.  - Grade group: I  - Perineural invasion: Absent     J. Prostate, left medial mid:  - Benign prostatic tissue.  - Negative for malignancy.      -  Multiplex immunohistochemical stain performed with appropriate controls shows benign glands with intact basal layer cells staining for p63 and high molecular weight keratin with negative luminal racemase expression, supporting the diagnosis.     K. Prostate, left lateral apex:  - Histologic Type: Acinar  adenocarcinoma  - [place] score: 4 + 3 = 7  - Involvement: Involving 95% discontinuously of  1 of 1 core.  - Grade group: III  - Percentage of pattern 4: 90%  - Perineural invasion: Absent      L. Prostate, left medial apex :  - Histologic Type: Acinar adenocarcinoma  - Parul score: 4 + 3 = 7  - Involvement: Involving 90% discontinuously of  1 of 1 core.  - Grade group: III  - Percentage of pattern 4: 80%  - Perineural invasion: Absent      -  Multiplex immunohistochemical stain performed with appropriate controls shows malignant glands with loss of basal layer cells staining for p63 and high molecular weight keratin with luminal racemase expression, supporting the diagnosis.     9/6/2023 -  Cancer Staged    Staging form: Prostate, AJCC 8th Edition  - Clinical stage from 9/6/2023: Stage RUBY (cT1c, cN1, cM0, PSA: 31, Grade Group: 3) - Signed by Kilo Carter MD on 9/6/2023  Histopathologic type: Adenocarcinoma, NOS  Stage prefix: Initial diagnosis  Prostate specific antigen (PSA) range: 20 or greater  Parul primary pattern: 4  [place] secondary pattern: 3  Parul score: 7  Histologic grading system: 5 grade system  Location of positive needle core biopsies: Both sides       11/19/2023 - 1/29/2024 Radiation      Plan ID Energy Fractions Dose per Fraction (cGy) Dose Correction (cGy) Total Dose Delivered (cGy) Elapsed Days   CD Prost_pSV 6X-FFF 19 / 19 180 0 3,420 27   Whole Pel_SIB 6X-FFF 25 / 25 220 0 5,500 40      Treatment dates:  C1: 11/19/2023 - 1/29/2024        Review of Systems Refer to nursing note.    Current Outpatient Medications on File Prior to Visit   Medication Sig Dispense Refill   • levothyroxine 75 mcg tablet TAKE 1 TABLET(75 MCG) BY MOUTH DAILY 90 tablet 1   • ipratropium-albuterol (DUO-NEB) 0.5-2.5 mg/3 mL nebulizer solution Take 3 mL by nebulization 2 (two) times a day (Patient not taking: Reported on 3/12/2025) 180 mL 3   • Multiple Vitamins-Minerals (MULTIVITAMIN GUMMIES ADULTS  "PO) Take 2 tablets by mouth daily (Patient not taking: Reported on 3/12/2025)       No current facility-administered medications on file prior to visit.      Social History     Tobacco Use   • Smoking status: Never     Passive exposure: Never   • Smokeless tobacco: Never   Vaping Use   • Vaping status: Never Used   Substance and Sexual Activity   • Alcohol use: Yes     Comment: social drinker as per [employer]   • Drug use: No   • Sexual activity: Not Currently     Partners: Female     Birth control/protection: Abstinence         Objective   BP 99/69 (BP Location: Left arm)   Pulse 82   Temp (!) 97.2 °F (36.2 °C) (Temporal)   Resp 17   Ht 5' 10\" (1.778 m)   Wt 65 kg (143 lb 3.2 oz)   SpO2 98%   BMI 20.55 kg/m²     Pain Screening:  Pain Score: 0-No pain  ECOG ECOG Performance Status: 1 - Restricted in physically strenuous activity but ambulatory and able to carry out work of a light or sedentary nature, e.g., light house work, office work  Physical Exam   Vitals and nursing note reviewed.   Constitutional:       General: He is not in acute distress.     Appearance: He is well-developed. He is not diaphoretic.   HENT:      Head: Normocephalic and atraumatic.      Mouth/Throat:      Pharynx: No oropharyngeal exudate.   Eyes:      General: No scleral icterus.     Conjunctiva/sclera: Conjunctivae normal.      Pupils: Pupils are equal, round, and reactive to light.   Neck:      Thyroid: No thyromegaly.      Trachea: No tracheal deviation.   Cardiovascular:      Rate and Rhythm: Normal rate and regular rhythm.      Heart sounds: Normal heart sounds.   Pulmonary:      Effort: Pulmonary effort is normal. No respiratory distress.      Breath sounds: Normal breath sounds. No stridor. No wheezing, rhonchi or rales.   Chest:      Chest wall: No tenderness.   Abdominal:      General: Abdomen is flat. Bowel sounds are normal. There is no distension.      Palpations: Abdomen is soft. There is no mass.      Tenderness: There " "is no abdominal tenderness.      Hernia: No hernia is present.   Genitourinary:     Comments: Rectal examination deferred.  Musculoskeletal:         General: No swelling or tenderness. Normal range of motion.      Cervical back: Normal range of motion and neck supple.   Lymphadenopathy:      Cervical: No cervical adenopathy.      Upper Body:      Right upper body: No supraclavicular adenopathy.      Left upper body: No supraclavicular adenopathy.      Lower Body: No right inguinal adenopathy. No left inguinal adenopathy.   Skin:     General: Skin is warm and dry.      Coloration: Skin is not jaundiced or pale.      Findings: No erythema or rash.   Neurological:      General: No focal deficit present.      Mental Status: He is alert and oriented to person, place, and time.      Cranial Nerves: No cranial nerve deficit.      Sensory: No sensory deficit.      Motor: No weakness.      Coordination: Coordination normal.   Psychiatric:         Behavior: Behavior normal.         Thought Content: Thought content normal.         Judgment: Judgment normal.     Administrative Statements   I have spent a total time of 20 minutes in caring for this patient on the day of the visit/encounter including Diagnostic results, Prognosis, Impressions, Counseling / Coordination of care, Documenting in the medical record, Reviewing/placing orders in the medical record (including tests, medications, and/or procedures), Obtaining or reviewing history  , and Communicating with other healthcare professionals .  Portions of the record may have been created with voice recognition software.  Occasional wrong word or \"sound a like\" substitutions may have occurred due to the inherent limitations of voice recognition software.  Read the chart carefully and recognize, using context, where substitutions have occurred.  "

## 2025-03-12 NOTE — ASSESSMENT & PLAN NOTE
Orders:  •  PSA Total, Diagnostic; Future  Bassam Tam is a 89 y.o. year old male with clinical stage RUBY, cT1c, cN1, M0 [place] score 4+3=7 prostate adenocarcinoma diagnosed after an elevated PSA of 31.01 May 31, 2023 that was repeated June 14, 2023 and was 25.48.  He reports this was his first PSA level.  He has a long history of BPH.  He had his first prostate biopsies on July 31, 2023 that revealed [place] score 4+3=7 disease in addition to [place] score 3+4=7 and 3+3=6 disease in multiple biopsies bilaterally. There were a total of 10 out of 12 biopsy cores that were positive.  Patient was seen by Dr. Ramirez and PSMA PET/CT was ordered and performed on September 1, 2023.  This revealed heterogeneous multifocal tracer activity involving the prostate gland consistent with malignancy.  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  We recommended radiation to the whole pelvis along with a boost to the lymph node and his prostate primary with a total of 9 weeks/44 fractions of radiation therapy.  We also recommended androgen deprivation therapy 2 to 3 months prior to the start of radiation therapy and he did have Lupron September 8, 2023.  He completed radiation therapy to a total dose of 7920 cGy along with 5500 cGy to the left pelvic lymph node on January 29, 2024.     He returns for follow-up examination today and is doing well.  PSA level went down to less than 0.01 NG/mL on March 6, 2024 and his PSA level decreased further on September 6, 2024 to less than 0.008 NG/mL.  His most recent PSA level February 25, 2025 remains at less than 0.008 NG/mL. We discussed with him and his wife that this indicates an excellent response to treatment.  Due to the side effects of Lupron and his PSA being nearly undetectable, decision was made to discontinue Lupron during his last urology visit April 30,  2024.  He has urology appointment scheduled for May 14, 2025.  He will return here for follow-up in 6 months with a repeat PSA level.

## 2025-03-12 NOTE — PROGRESS NOTES
Bassam Tam 1935 is a 89 y.o. male With history of Leilani 7 4+3 prostate cancer. He completed radiation on 1/29/24.He was last seen in radiation on 9/11/2024  He returns for follow up.        PSA   Latest Ref Rng 0.000 - 4.000 ng/mL   3/6/2024 <0.01    9/6/2024 <0.008    2/25/2025 <0.008      11/13/24 [facility]  He is doing well. Will continue observation PSA and follow up 6 months.     Upcoming  5/14/25 Urology     Follow up visit     Oncology History Overview Note   With history of Leilani 7 4+3 prostate cancer. He completed radiation on 1/29/24.He was last seen in radiation on 1935. He returns for follow up.        PSA   Latest Ref Rng 0.000 - 4.000 ng/mL   3/6/2024 <0.01    9/6/2024 <0.008    2/25/2025 <0.008      11/13/24 [facility]  He is doing well. Will continue observation PSA and follow up 6 months.     Upcoming  5/14/25     Prostate cancer (HCC)   7/31/2023 Initial Diagnosis    Prostate cancer (HCC)     7/31/2023 Biopsy    A. Prostate, right lateral base:  - Histologic Type: Acinar adenocarcinoma  - Leilani score: 3 + 4 = 7  - Involvement: Involving 10% and 10% of  2 of 2 cores.  - Grade group: II  - Percentage of pattern 4: approximately 10%  - Perineural invasion: Absent     -  Multiplex immunohistochemical stain performed with appropriate controls shows malignant glands with loss of basal layer cells staining for p63 and high molecular weight keratin with luminal racemase expression, supporting the diagnosis.     B. Prostate, right medial base:  - Histologic Type: Acinar adenocarcinoma  - Leilani score: 3 + 4 = 7  - Involvement: Involving 95% of 1 of 2 core.  - Grade group: II  - Percentage of pattern 4: 5-10%  - Perineural invasion: Absent      -  Multiplex immunohistochemical stain performed with appropriate controls on block B2 shows benign glands with intact basal layer cells staining for p63 and high molecular weight keratin with negative luminal racemase expression,  supporting the diagnosis.     C. Prostate, right lateral mid:  - Histologic Type: Acinar adenocarcinoma  - Parul score: 3 + 4 = 7  - Involvement: Involving 90% of 1 of 1 core.  - Grade group: II  - Percentage of pattern 4: 15%  - Perineural invasion: Absent     D. Prostate, right medial mis:  - Histologic Type: Acinar adenocarcinoma  - Parul score: 3 + 4 = 7  - Involvement: Involving 60% of  1 of 1 core.  - Grade group: II  - Percentage of pattern 4: 30%  - Perineural invasion: Absent     E. Prostate, right lateral apex:  - Histologic Type: Acinar adenocarcinoma  - Parul score: 3 + 4 = 7  - Involvement: Involving 95% of  1 of 1 core.  - Grade group: II  - Percentage of pattern 4: 15%  - Perineural invasion: Absent     F. Prostate, right medial apex:  - Histologic Type: Acinar adenocarcinoma  - Parul score: 3 + 4 = 7  - Involvement: Involving 50% of 1 of 1 core.  - Grade group: II  - Percentage of pattern 4: 25%  - Perineural invasion: Absent     G. Prostate, left lateral base:  - Benign prostatic tissue.  - Negative for malignancy.       -  Multiplex immunohistochemical stain performed with appropriate controls shows benign glands with intact basal layer cells staining for p63 and high molecular weight keratin with negative luminal racemase expression, supporting the diagnosis.     H. Prostate, left medial base:  - Benign prostatic tissue.  - Negative for malignancy.      -  Multiplex immunohistochemical stain performed with appropriate controls shows benign glands with intact basal layer cells staining for p63 and high molecular weight keratin with negative luminal racemase expression, supporting the diagnosis.     I. Prostate, left lateral mid:  - Histologic Type: Acinar adenocarcinoma  - Parul score: 3 + 3 = 6  - Involvement: Involving 20% of 1 of 1 core.  - Grade group: I  - Perineural invasion: Absent     J. Prostate, left medial mid:  - Benign prostatic tissue.  - Negative for malignancy.      -   Multiplex immunohistochemical stain performed with appropriate controls shows benign glands with intact basal layer cells staining for p63 and high molecular weight keratin with negative luminal racemase expression, supporting the diagnosis.     K. Prostate, left lateral apex:  - Histologic Type: Acinar adenocarcinoma  - Salmon score: 4 + 3 = 7  - Involvement: Involving 95% discontinuously of  1 of 1 core.  - Grade group: III  - Percentage of pattern 4: 90%  - Perineural invasion: Absent      L. Prostate, left medial apex :  - Histologic Type: Acinar adenocarcinoma  - Parul score: 4 + 3 = 7  - Involvement: Involving 90% discontinuously of  1 of 1 core.  - Grade group: III  - Percentage of pattern 4: 80%  - Perineural invasion: Absent      -  Multiplex immunohistochemical stain performed with appropriate controls shows malignant glands with loss of basal layer cells staining for p63 and high molecular weight keratin with luminal racemase expression, supporting the diagnosis.     9/6/2023 -  Cancer Staged    Staging form: Prostate, AJCC 8th Edition  - Clinical stage from 9/6/2023: Stage RUBY (cT1c, cN1, cM0, PSA: 31, Grade Group: 3) - Signed by Kilo Carter MD on 9/6/2023  Histopathologic type: Adenocarcinoma, NOS  Stage prefix: Initial diagnosis  Prostate specific antigen (PSA) range: 20 or greater  Salmon primary pattern: 4  Salmon secondary pattern: 3  Parul score: 7  Histologic grading system: 5 grade system  Location of positive needle core biopsies: Both sides       11/19/2023 - 1/29/2024 Radiation      Plan ID Energy Fractions Dose per Fraction (cGy) Dose Correction (cGy) Total Dose Delivered (cGy) Elapsed Days   CD Prost_pSV 6X-FFF 19 / 19 180 0 3,420 27   Whole Pel_SIB 6X-FFF 25 / 25 220 0 5,500 40      Treatment dates:  C1: 11/19/2023 - 1/29/2024         Review of Systems:  Review of Systems   Constitutional:  Negative for activity change, appetite change and fatigue.   HENT: Negative.      Eyes: Negative.    Respiratory: Negative.     Cardiovascular: Negative.    Gastrointestinal: Negative.  Negative for blood in stool, constipation, diarrhea, nausea and vomiting.   Endocrine: Negative.    Genitourinary:  Positive for frequency (at night) and urgency. Negative for difficulty urinating, dysuria and hematuria.   Musculoskeletal: Negative.    Skin: Negative.    Allergic/Immunologic: Negative.    Neurological: Negative.    Hematological: Negative.    Psychiatric/Behavioral: Negative.         Clinical Trial: no    IPSS Questionnaire (AUA-7):  Over the past month…    1)  How often have you had a sensation of not emptying your bladder completely after you finish urinating?  0 - Not at all   2)  How often have you had to urinate again less than two hours after you finished urinating? 0 - Not at all   3)  How often have you found you stopped and started again several times when you urinated?  0 - Not at all   4) How difficult have you found it to postpone urination?  2 - Less than half the time   5) How often have you had a weak urinary stream?  1 - Less than 1 time in 5   6) How often have you had to push or strain to begin urination?  0 - Not at all   7) How many times did you most typically get up to urinate from the time you went to bed until the time you got up in the morning?  2 - 2 times   Total Score:  5       Teaching completed     Health Maintenance   Topic Date Due    PT PLAN OF CARE  Never done    RSV Vaccine for Pregnant Patients and Patients Age 60+ Years (1 - 1-dose 75+ series) Never done    COVID-19 Vaccine (5 - 2024-25 season) 09/01/2024    Diabetic Foot Exam  10/17/2024    HEMOGLOBIN A1C  03/11/2025    Medicare Annual Wellness Visit (AWV)  05/01/2025    Diabetic Eye Exam  10/10/2025    Fall Risk  11/07/2025    Depression Screening  11/07/2025    BMI: Adult  02/04/2026    Zoster Vaccine  Completed    Pneumococcal Vaccine: 65+ Years  Completed    Influenza Vaccine  Completed    Meningococcal  B Vaccine  Aged Out    RSV Vaccine age 0-20 Months  Aged Out    HIB Vaccine  Aged Out    IPV Vaccine  Aged Out    Hepatitis A Vaccine  Aged Out    Meningococcal ACWY Vaccine  Aged Out    HPV Vaccine  Aged Out     Patient Active Problem List   Diagnosis    Hypothyroidism    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, without long-term current use of insulin (HCC)    Loss of hearing    Primary osteoarthritis of left hip    Primary osteoarthritis of both knees    Male erectile dysfunction, unspecified    Vertigo    RBBB (right bundle branch block)    Pancreatic cyst    Left hip pain    Hammertoe    Seborrheic keratoses    Allergic rhinitis    Onychomycosis    Nail hypertrophy    Callus    Mild protein-calorie malnutrition (HCC)    Chronic cough    Postnasal drip    Weight loss    Benign prostatic hyperplasia with nocturia    Hydronephrosis of left kidney    Prostate cancer (HCC)    Abnormal CT of the chest    Unsteady gait    Bronchiectasis (HCC)    Diarrhea     Past Medical History:   Diagnosis Date    Abnormal loss of weight     last assessed: 4/24/13    Allergic     Arthritis     Cataract     Diabetes mellitus (HCC)     Diverticulitis     Diverticulitis of colon     Diverticulosis     Lung nodule     Prostate cancer (HCC)     Shingles     Skin cancer     Type 2 diabetes mellitus (HCC)      Past Surgical History:   Procedure Laterality Date    APPENDECTOMY      Age 16    CATARACT EXTRACTION      right 5/2002, left 12/2003    COLON SURGERY      for diverticulitis    COLONOSCOPY      done 5/5/2009, mild diverticulosis noted, recheck in 3 years    FL INJECTION LEFT HIP (NON ARTHROGRAM)  11/01/2022    FL INJECTION RIGHT HIP (NON ARTHROGRAM)  11/01/2022    INNER EAR SURGERY Left     Cochlear device implantation; resolved: Sep 2012    MOHS SURGERY Left     left wrist    [place] PLMT INTERSTITIAL DEV RADIAT TX PROSTATE 1/MULT N/A 10/24/2023    Procedure: INSERTION OF FIDUCIAL MARKER , SPACEOAR;  Surgeon:  Gus Solis MD;  Location: [facility];  Service: Urology    SHOULDER SURGERY  05/2007    right rotator cuff     Family History   Problem Relation Age of Onset    Coronary artery disease Sister     Diabetes Sister     Breast cancer Sister 70    Lung cancer Sister     Diabetes Daughter      Social History     Socioeconomic History    Marital status: /Civil Union     Spouse name: Not on file    Number of children: Not on file    Years of education: Not on file    Highest education level: Not on file   Occupational History    Not on file   Tobacco Use    Smoking status: Never     Passive exposure: Never    Smokeless tobacco: Never   Vaping Use    Vaping status: Never Used   Substance and Sexual Activity    Alcohol use: Yes     Comment: social drinker as per [employer]    Drug use: No    Sexual activity: Not Currently     Partners: Female     Birth control/protection: Abstinence   Other Topics Concern    Not on file   Social History Narrative    Not on file     Social Drivers of Health     Financial Resource Strain: Low Risk  (3/28/2023)    Overall Financial Resource Strain (CARDIA)     Difficulty of Paying Living Expenses: Not hard at all   Food Insecurity: No Food Insecurity (4/29/2024)    Nursing - Inadequate Food Risk Classification     Worried About Running Out of Food in the Last Year: Never true     Ran Out of Food in the Last Year: Never true     Ran Out of Food in the Last Year: Not on file   Transportation Needs: No Transportation Needs (4/29/2024)    PRAPARE - Transportation     Lack of Transportation (Medical): No     Lack of Transportation (Non-Medical): No   Physical Activity: Not on file   Stress: Not on file   Social Connections: Not on file   Intimate Partner Violence: Not on file   Housing Stability: Low Risk  (4/29/2024)    Housing Stability Vital Sign     Unable to Pay for Housing in the Last Year: No     Number of Times Moved in the Last Year: 1     Homeless in the Last Year: No        Current Outpatient Medications:     ipratropium-albuterol (DUO-NEB) 0.5-2.5 mg/3 mL nebulizer solution, Take 3 mL by nebulization 2 (two) times a day, Disp: 180 mL, Rfl: 3    levothyroxine 75 mcg tablet, TAKE 1 TABLET(75 MCG) BY MOUTH DAILY, Disp: 90 tablet, Rfl: 1    Multiple Vitamins-Minerals (MULTIVITAMIN GUMMIES ADULTS PO), Take 2 tablets by mouth daily, Disp: , Rfl:   No Known Allergies  Vitals:    03/12/25 0859   Weight: 65 kg (143 lb 3.2 oz)

## 2025-04-23 ENCOUNTER — RESULTS FOLLOW-UP (OUTPATIENT)
Dept: FAMILY MEDICINE CLINIC | Facility: CLINIC | Age: OVER 89
End: 2025-04-23

## 2025-04-23 ENCOUNTER — APPOINTMENT (OUTPATIENT)
Dept: LAB | Facility: HOSPITAL | Age: OVER 89
End: 2025-04-23
Payer: MEDICARE

## 2025-04-23 DIAGNOSIS — C61 PROSTATE CANCER (HCC): ICD-10-CM

## 2025-04-23 DIAGNOSIS — E03.9 ACQUIRED HYPOTHYROIDISM: ICD-10-CM

## 2025-04-23 DIAGNOSIS — E11.40 CONTROLLED TYPE 2 DIABETES MELLITUS WITH DIABETIC NEUROPATHY, WITHOUT LONG-TERM CURRENT USE OF INSULIN (HCC): ICD-10-CM

## 2025-04-23 LAB
ALBUMIN SERPL BCG-MCNC: 3.9 G/DL (ref 3.5–5)
ALP SERPL-CCNC: 48 U/L (ref 34–104)
ALT SERPL W P-5'-P-CCNC: 11 U/L (ref 7–52)
ANION GAP SERPL CALCULATED.3IONS-SCNC: 8 MMOL/L (ref 4–13)
AST SERPL W P-5'-P-CCNC: 17 U/L (ref 13–39)
BILIRUB SERPL-MCNC: 0.68 MG/DL (ref 0.2–1)
BUN SERPL-MCNC: 23 MG/DL (ref 5–25)
CALCIUM SERPL-MCNC: 9.5 MG/DL (ref 8.4–10.2)
CHLORIDE SERPL-SCNC: 103 MMOL/L (ref 96–108)
CO2 SERPL-SCNC: 27 MMOL/L (ref 21–32)
CREAT SERPL-MCNC: 1.13 MG/DL (ref 0.6–1.3)
ERYTHROCYTE [DISTWIDTH] IN BLOOD BY AUTOMATED COUNT: 12.1 % (ref 11.6–15.1)
EST. AVERAGE GLUCOSE BLD GHB EST-MCNC: 163 MG/DL
GFR SERPL CREATININE-BSD FRML MDRD: 56 ML/MIN/1.73SQ M
GLUCOSE P FAST SERPL-MCNC: 160 MG/DL (ref 65–99)
HBA1C MFR BLD: 7.3 %
HCT VFR BLD AUTO: 37.5 % (ref 36.5–49.3)
HGB BLD-MCNC: 12.6 G/DL (ref 12–17)
MCH RBC QN AUTO: 29.9 PG (ref 26.8–34.3)
MCHC RBC AUTO-ENTMCNC: 33.6 G/DL (ref 31.4–37.4)
MCV RBC AUTO: 89 FL (ref 82–98)
PLATELET # BLD AUTO: 196 THOUSANDS/UL (ref 149–390)
PMV BLD AUTO: 9.2 FL (ref 8.9–12.7)
POTASSIUM SERPL-SCNC: 4.2 MMOL/L (ref 3.5–5.3)
PROT SERPL-MCNC: 6.5 G/DL (ref 6.4–8.4)
RBC # BLD AUTO: 4.21 MILLION/UL (ref 3.88–5.62)
SODIUM SERPL-SCNC: 138 MMOL/L (ref 135–147)
TSH SERPL DL<=0.05 MIU/L-ACNC: 2.38 UIU/ML (ref 0.45–4.5)
WBC # BLD AUTO: 7.71 THOUSAND/UL (ref 4.31–10.16)

## 2025-04-23 PROCEDURE — 84443 ASSAY THYROID STIM HORMONE: CPT

## 2025-04-23 PROCEDURE — 36415 COLL VENOUS BLD VENIPUNCTURE: CPT

## 2025-04-23 PROCEDURE — 80053 COMPREHEN METABOLIC PANEL: CPT

## 2025-04-23 PROCEDURE — 83036 HEMOGLOBIN GLYCOSYLATED A1C: CPT

## 2025-04-23 PROCEDURE — 85027 COMPLETE CBC AUTOMATED: CPT

## 2025-05-05 ENCOUNTER — RA CDI HCC (OUTPATIENT)
Dept: OTHER | Facility: HOSPITAL | Age: OVER 89
End: 2025-05-05

## 2025-05-05 PROBLEM — E11.3291 TYPE 2 DIABETES MELLITUS WITH MILD NONPROLIFERATIVE RETINOPATHY OF RIGHT EYE WITHOUT MACULAR EDEMA (HCC): Status: ACTIVE | Noted: 2025-05-05

## 2025-05-05 NOTE — PROGRESS NOTES
HCC coding opportunities          Chart Reviewed number of suggestions sent to Provider: 3     Patients Insurance     Medicare Insurance: Medicare        [id number]  E11.36  E11.65

## 2025-05-06 ENCOUNTER — OFFICE VISIT (OUTPATIENT)
Dept: OBGYN CLINIC | Facility: HOSPITAL | Age: OVER 89
End: 2025-05-06
Payer: MEDICARE

## 2025-05-06 VITALS — WEIGHT: 144 LBS | BODY MASS INDEX: 20.62 KG/M2 | HEIGHT: 70 IN

## 2025-05-06 DIAGNOSIS — G89.29 CHRONIC PAIN OF BOTH KNEES: ICD-10-CM

## 2025-05-06 DIAGNOSIS — M25.562 CHRONIC PAIN OF BOTH KNEES: ICD-10-CM

## 2025-05-06 DIAGNOSIS — M17.0 PRIMARY OSTEOARTHRITIS OF BOTH KNEES: Primary | ICD-10-CM

## 2025-05-06 DIAGNOSIS — M25.561 CHRONIC PAIN OF BOTH KNEES: ICD-10-CM

## 2025-05-06 PROCEDURE — 20610 DRAIN/INJ JOINT/BURSA W/O US: CPT | Performed by: ORTHOPAEDIC SURGERY

## 2025-05-06 PROCEDURE — 99213 OFFICE O/P EST LOW 20 MIN: CPT | Performed by: ORTHOPAEDIC SURGERY

## 2025-05-06 RX ORDER — LIDOCAINE HYDROCHLORIDE 10 MG/ML
2 INJECTION, SOLUTION INFILTRATION; PERINEURAL
Status: COMPLETED | OUTPATIENT
Start: 2025-05-06 | End: 2025-05-06

## 2025-05-06 RX ORDER — BETAMETHASONE SODIUM PHOSPHATE AND BETAMETHASONE ACETATE 3; 3 MG/ML; MG/ML
12 INJECTION, SUSPENSION INTRA-ARTICULAR; INTRALESIONAL; INTRAMUSCULAR; SOFT TISSUE
Status: COMPLETED | OUTPATIENT
Start: 2025-05-06 | End: 2025-05-06

## 2025-05-06 RX ORDER — BUPIVACAINE HYDROCHLORIDE 2.5 MG/ML
2 INJECTION, SOLUTION INFILTRATION; PERINEURAL
Status: COMPLETED | OUTPATIENT
Start: 2025-05-06 | End: 2025-05-06

## 2025-05-06 RX ADMIN — BUPIVACAINE HYDROCHLORIDE 2 ML: 2.5 INJECTION, SOLUTION INFILTRATION; PERINEURAL at 10:30

## 2025-05-06 RX ADMIN — LIDOCAINE HYDROCHLORIDE 2 ML: 10 INJECTION, SOLUTION INFILTRATION; PERINEURAL at 10:30

## 2025-05-06 RX ADMIN — BETAMETHASONE SODIUM PHOSPHATE AND BETAMETHASONE ACETATE 12 MG: 3; 3 INJECTION, SUSPENSION INTRA-ARTICULAR; INTRALESIONAL; INTRAMUSCULAR; SOFT TISSUE at 10:30

## 2025-05-06 NOTE — PROGRESS NOTES
Name: Bassam Tam      : 1935       MRN: [id number]   Encounter Provider: Sylvester Badillo MD   Encounter Date: 25  Encounter department: [facility] [place]     ASSESSMENT & PLAN:  Assessment & Plan  Primary osteoarthritis of both knees  Cortisone injections (RCC) performed today, 25  Orders:    Large joint arthrocentesis: bilateral knee    Chronic pain of both knees  Cortisone injections (RCC) performed today, 25  Orders:    Large joint arthrocentesis: bilateral knee      ___________________________________________________  Diagnosis, treatment options and associated risks were discussed with the patient including no treatment, nonsurgical treatment and potential for surgical intervention.  The patient was given the opportunity to ask questions regarding each.   He was offered, accepted, performed injections of cortisone (RCC) today to both knees for symptomatic relief.  He tolerated both injections well.  Ice postinjection protocol advised.  Weightbearing activity as tolerated.    To do next visit:  Return in about 3 months (around 2025) for re-check, or sooner if symptoms worsen or fail to improve.  ____________________________________________________  CHIEF COMPLAINT:  Chief Complaint   Patient presents with    Left Knee - Follow-up    Right Knee - Follow-up         SUBJECTIVE:  Bassam Tam is a 89 y.o. male who presents today for repeat evaluation of his bilateral knees due to return of pain.  He has right greater than left knee pain.  He has known advanced osteoarthritis.  At his visit 3 months ago both knees were injected with a Visco product of Monovisc without any appreciable relief.  He states that his knees have been bothersome over the past 2 months.  Pain scale 7/10 or greater at times.  He has been able to get out on golf however his knees have been bothersome.  Recently had his A1c lab which is down however it is still  7.3.            PAST MEDICAL HISTORY:  Past Medical History:   Diagnosis Date    Abnormal loss of weight     last assessed: 4/24/13    Allergic     Arthritis     Cataract     Diabetes mellitus (HCC)     Diverticulitis     Diverticulitis of colon     Diverticulosis     Lung nodule     Prostate cancer (HCC)     Shingles     Skin cancer     Type 2 diabetes mellitus (HCC)        PAST SURGICAL HISTORY:  Past Surgical History:   Procedure Laterality Date    APPENDECTOMY      Age 16    CATARACT EXTRACTION      right 5/2002, left 12/2003    COLON SURGERY      for diverticulitis    COLONOSCOPY      done 5/5/2009, mild diverticulosis noted, recheck in 3 years    FL INJECTION LEFT HIP (NON ARTHROGRAM)  11/01/2022    FL INJECTION RIGHT HIP (NON ARTHROGRAM)  11/01/2022    INNER EAR SURGERY Left     Cochlear device implantation; resolved: Sep 2012    MOHS SURGERY Left     left wrist    [place] PLMT INTERSTITIAL DEV RADIAT TX PROSTATE 1/MULT N/A 10/24/2023    Procedure: INSERTION OF FIDUCIAL MARKER , SPACEOAR;  Surgeon: Gus Solis MD;  Location: [facility];  Service: Urology    SHOULDER SURGERY  05/2007    right rotator cuff       FAMILY HISTORY:  Family History   Problem Relation Age of Onset    Coronary artery disease Sister     Diabetes Sister     Breast cancer Sister 70    Lung cancer Sister     Diabetes Daughter        SOCIAL HISTORY:  Social History     Tobacco Use    Smoking status: Never     Passive exposure: Never    Smokeless tobacco: Never   Vaping Use    Vaping status: Never Used   Substance Use Topics    Alcohol use: Yes     Comment: social drinker as per [employer]    Drug use: No       MEDICATIONS:    Current Outpatient Medications:     ipratropium-albuterol (DUO-NEB) 0.5-2.5 mg/3 mL nebulizer solution, Take 3 mL by nebulization 2 (two) times a day (Patient not taking: Reported on 3/12/2025), Disp: 180 mL, Rfl: 3    levothyroxine 75 mcg tablet, TAKE 1 TABLET(75 MCG) BY MOUTH DAILY, Disp: 90 tablet, Rfl: 1    Multiple  "Vitamins-Minerals (MULTIVITAMIN GUMMIES ADULTS PO), Take 2 tablets by mouth daily (Patient not taking: Reported on 3/12/2025), Disp: , Rfl:     ALLERGIES:  No Known Allergies    LABS:  HgA1c:   Lab Results   Component Value Date    HGBA1C 7.3 (H) 04/23/2025     BMP:   Lab Results   Component Value Date    GLUCOSE 136 08/14/2015    CALCIUM 9.5 04/23/2025     08/14/2015    K 4.2 04/23/2025    CO2 27 04/23/2025     04/23/2025    BUN 23 04/23/2025    CREATININE 1.13 04/23/2025     CBC: No components found for: \"CBC\"    _____________________________________________________  PHYSICAL EXAMINATION:  Vital signs: Ht 5' 10\" (1.778 m)   Wt 65.3 kg (144 lb)   BMI 20.66 kg/m²   General: No acute distress, awake and alert  Psychiatric: Mood and affect appear appropriate  HEENT: Trachea Midline, No torticollis, no apparent facial trauma  Cardiovascular: No audible murmurs; Extremities appear perfused  Pulmonary: No audible wheezing or stridor  Skin: Intact, no open lesions; see further details (if any) below    MUSCULOSKELETAL EXAMINATION:    Right Knee Exam     Muscle Strength   The patient has normal right knee strength.    Tenderness   The patient is experiencing tenderness in the medial joint line.    Range of Motion   Extension:  5   Flexion:  120 (With crepitation and stiffness)     Other   Erythema: absent  Sensation: normal  Effusion: no effusion present      Left Knee Exam     Muscle Strength   The patient has normal left knee strength.    Tenderness   The patient is experiencing tenderness in the medial joint line.    Range of Motion   Extension:  0   Flexion:  120 (With crepitation and stiffness)     Other   Erythema: absent  Sensation: normal  Effusion: no effusion present    Comments:    Both knees remain in mild varus alignment with bony enlargement medially.              _____________________________________________________  STUDIES REVIEWED:    None performed        PROCEDURES PERFORMED:    Large " "joint arthrocentesis: bilateral knee  Universal Protocol:  Consent: Verbal consent obtained.  Risks and benefits: risks, benefits and alternatives were discussed  Consent given by: patient  Time out: Immediately prior to procedure a \"time out\" was called to verify the correct patient, procedure, equipment, support staff and site/side marked as required.  Timeout called at: 5/6/2025 10:49 AM.  Patient understanding: patient states understanding of the procedure being performed  Site marked: the operative site was marked  Patient identity confirmed: verbally with patient  Supporting Documentation  Indications: pain and diagnostic evaluation     Is this a Visco injection? NoProcedure Details  Location: knee - bilateral knee  Preparation: Patient was prepped and draped in the usual sterile fashion  Needle size: 22 G  Ultrasound guidance: no    Medications (Right): 2 mL bupivacaine 0.25 %; 2 mL lidocaine 1 %; 12 mg betamethasone acetate-betamethasone sodium phosphate 6 (3-3) mg/mLMedications (Left): 2 mL bupivacaine 0.25 %; 2 mL lidocaine 1 %; 12 mg betamethasone acetate-betamethasone sodium phosphate 6 (3-3) mg/mL   Patient tolerance: patient tolerated the procedure well with no immediate complications  Dressing:  Sterile dressing applied               Scribe Attestation      I,:  Alex Cast am acting as a scribe while in the presence of the attending physician.:       I,:  Sylvester Badillo MD personally performed the services described in this documentation    as scribed in my presence.:           "

## 2025-05-06 NOTE — ASSESSMENT & PLAN NOTE
Cortisone injections (RCC) performed today, 5/6/25  Orders:    Large joint arthrocentesis: bilateral knee

## 2025-05-07 DIAGNOSIS — E03.9 ACQUIRED HYPOTHYROIDISM: ICD-10-CM

## 2025-05-07 RX ORDER — LEVOTHYROXINE SODIUM 75 UG/1
75 TABLET ORAL DAILY
Qty: 90 TABLET | Refills: 1 | Status: SHIPPED | OUTPATIENT
Start: 2025-05-07

## 2025-05-12 NOTE — ASSESSMENT & PLAN NOTE
Lab Results   Component Value Date    HGBA1C 7.3 (A) 10/17/2023     Diet control. Alert-The patient is alert, awake and responds to voice. The patient is oriented to time, place, and person. The triage nurse is able to obtain subjective information.

## 2025-05-13 ENCOUNTER — OFFICE VISIT (OUTPATIENT)
Dept: FAMILY MEDICINE CLINIC | Facility: CLINIC | Age: OVER 89
End: 2025-05-13
Payer: MEDICARE

## 2025-05-13 VITALS
DIASTOLIC BLOOD PRESSURE: 68 MMHG | WEIGHT: 138.2 LBS | BODY MASS INDEX: 19.79 KG/M2 | RESPIRATION RATE: 18 BRPM | TEMPERATURE: 97.8 F | HEART RATE: 72 BPM | OXYGEN SATURATION: 100 % | HEIGHT: 70 IN | SYSTOLIC BLOOD PRESSURE: 114 MMHG

## 2025-05-13 DIAGNOSIS — Z23 ENCOUNTER FOR IMMUNIZATION: ICD-10-CM

## 2025-05-13 DIAGNOSIS — Z00.00 MEDICARE ANNUAL WELLNESS VISIT, SUBSEQUENT: ICD-10-CM

## 2025-05-13 DIAGNOSIS — C61 PROSTATE CANCER (HCC): ICD-10-CM

## 2025-05-13 DIAGNOSIS — E03.9 ACQUIRED HYPOTHYROIDISM: ICD-10-CM

## 2025-05-13 DIAGNOSIS — E11.3291 TYPE 2 DIABETES MELLITUS WITH RIGHT EYE AFFECTED BY MILD NONPROLIFERATIVE RETINOPATHY WITHOUT MACULAR EDEMA, WITHOUT LONG-TERM CURRENT USE OF INSULIN (HCC): Primary | ICD-10-CM

## 2025-05-13 DIAGNOSIS — R26.81 UNSTEADY GAIT: ICD-10-CM

## 2025-05-13 DIAGNOSIS — J47.9 BRONCHIECTASIS WITHOUT COMPLICATION (HCC): ICD-10-CM

## 2025-05-13 PROCEDURE — G0439 PPPS, SUBSEQ VISIT: HCPCS | Performed by: FAMILY MEDICINE

## 2025-05-13 PROCEDURE — 99214 OFFICE O/P EST MOD 30 MIN: CPT | Performed by: FAMILY MEDICINE

## 2025-05-13 PROCEDURE — G0009 ADMIN PNEUMOCOCCAL VACCINE: HCPCS

## 2025-05-13 PROCEDURE — G2211 COMPLEX E/M VISIT ADD ON: HCPCS | Performed by: FAMILY MEDICINE

## 2025-05-13 PROCEDURE — 90677 PCV20 VACCINE IM: CPT

## 2025-05-13 RX ORDER — BLOOD-GLUCOSE METER
KIT MISCELLANEOUS
Qty: 1 KIT | Refills: 0 | Status: SHIPPED | OUTPATIENT
Start: 2025-05-13

## 2025-05-13 RX ORDER — LANCETS 33 GAUGE
EACH MISCELLANEOUS
Qty: 100 EACH | Refills: 3 | Status: SHIPPED | OUTPATIENT
Start: 2025-05-13

## 2025-05-13 RX ORDER — BLOOD SUGAR DIAGNOSTIC
STRIP MISCELLANEOUS
Qty: 100 EACH | Refills: 3 | Status: SHIPPED | OUTPATIENT
Start: 2025-05-13

## 2025-05-13 NOTE — PROGRESS NOTES
Name: Bassam Tam      : 1935      MRN: [id number]  Encounter Provider: Giovanni Mancuso MD  Encounter Date: 2025   Encounter department: [facility]  :  Chief Complaint   Patient presents with    Medicare Wellness Visit     Subsequent visit      Health Maintenance   Topic Date Due    PT PLAN OF CARE  Never done    RSV Vaccine for Pregnant Patients and Patients Age 60+ Years (1 - 1-dose 75+ series) Never done    COVID-19 Vaccine ( season) 2024    Diabetic Foot Exam  10/17/2024    Medicare Annual Wellness Visit (AWV)  2025    Diabetic Eye Exam  10/10/2025    HEMOGLOBIN A1C  10/23/2025    Fall Risk  2026    Depression Screening  2026    Zoster Vaccine  Completed    Pneumococcal Vaccine: 65+ Years  Completed    Influenza Vaccine  Completed    Meningococcal B Vaccine  Aged Out    RSV Vaccine age 0-20 Months  Aged Out    HIB Vaccine  Aged Out    IPV Vaccine  Aged Out    Hepatitis A Vaccine  Aged Out    Meningococcal ACWY Vaccine  Aged Out    HPV Vaccine  Aged Out       Assessment & Plan  Type 2 diabetes mellitus with right eye affected by mild nonproliferative retinopathy without macular edema, without long-term current use of insulin ([facility])    Lab Results   Component Value Date    HGBA1C 7.3 (H) 2025     Diet control.     Orders:    Blood Glucose Monitoring Suppl (OneTouch Verio Reflect) w/Device KIT; Check blood sugars once daily. Please substitute with appropriate alternative as covered by patient's insurance. Dx: E11.65    glucose blood (OneTouch Verio) test strip; Check blood sugars once daily. Please substitute with appropriate alternative as covered by patient's insurance. Dx: E11.65    OneTouch Delica Lancets 33G MISC; Check blood sugars once daily. Please substitute with appropriate alternative as covered by patient's insurance. Dx: E11.65    Acquired hypothyroidism  Continue levothyroxine 75mcg daily.        Bronchiectasis without  complication (HCC)  Stable. FU pulmonary.        Prostate cancer (HCC)  FU urology.        Unsteady gait  Fall precautions. FU physical therapy.        Encounter for immunization    Orders:    Pneumococcal Conjugate Vaccine 20-valent (Pcv20)    Medicare annual wellness visit, subsequent           Depression Screening and Follow-up Plan: Patient was screened for depression during today's encounter. They screened negative with a PHQ-2 score of 0.        Reviewed lab in 4/2025  CBC normal  CMP ok GFR 56  hgA1C 7.3 stable  TSH normal    Flu shot yearly.   Refused Covid19 boosters.   Got PCV13 and pneumovax. Give PCV20 today.   Got shingrix.   Got Tdap in 2023.   Fall precautions.   RTO in 6 months.       Preventive health issues were discussed with patient, and age appropriate screening tests were ordered as noted in patient's After Visit Summary. Personalized health advice and appropriate referrals for health education or preventive services given if needed, as noted in patient's After Visit Summary.    History of Present Illness     HPI     Pt is here with his wife.      DM---4/2025 HgA1C 7.3 stable. Diet control.   Denies hypoglycemia.   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   FU ophthalmologist regularly.   FU podiatry.      Hypothyroidism---He is on levothyroxine 75mcg daily.      Prostate cancer---FU urology and hem/onc.   He got lupron and recent PSA <0.008 in 2/2025.      Chronic cough for years. FU pulmonary.   Tried duoneb bid prn.   Pt has postnasal drip. Does not use claritin and flonase because they affect his appetite.       Live with wife. Does all ADL's. Plays golf/gardening regularly.   Denies recent falls. [place] unsteady. FU [facility].   Denies depression.          Patient Care Team:  Giovanni Mancuso MD as PCP - General (Family Medicine)  DO She Castillo RD (Nutrition)  Nichelle Fraser MA as Care Coordinator (Oncology)  Kilo Carter MD (Radiation Oncology)    Review of Systems    Constitutional:  Negative for appetite change, chills and fever.   HENT:  Negative for congestion, ear pain, sinus pain and sore throat.    Eyes:  Negative for discharge and itching.   Respiratory:  Negative for apnea, cough, chest tightness, shortness of breath and wheezing.    Cardiovascular:  Negative for chest pain, palpitations and leg swelling.   Gastrointestinal:  Negative for abdominal pain, anal bleeding, constipation, diarrhea, nausea and vomiting.   Endocrine: Negative for cold intolerance, heat intolerance and polyuria.   Genitourinary:  Negative for difficulty urinating and dysuria.   Musculoskeletal:  Positive for gait problem. Negative for arthralgias, back pain and myalgias.   Skin:  Negative for rash.   Neurological:  Negative for dizziness and headaches.   Psychiatric/Behavioral:  Negative for agitation.      Medical History Reviewed by provider this encounter:  Tobacco  Allergies  Meds  Problems  Med Hx  Surg Hx  Fam Hx  Soc   Hx      Annual Wellness Visit Questionnaire   Bassam is here for his Subsequent Wellness visit.     Health Risk Assessment:   Patient rates overall health as very good. Patient feels that their physical health rating is slightly better. Patient is satisfied with their life. Eyesight was rated as same. Hearing was rated as same. Patient feels that their emotional and mental health rating is same. Patients states they are never, rarely angry. Patient states they are never, rarely unusually tired/fatigued. Pain experienced in the last 7 days has been some. Patient's pain rating has been 3/10. Patient states that he has experienced no weight loss or gain in last 6 months.     Depression Screening:   PHQ-2 Score: 0      Fall Risk Screening:   In the past year, patient has experienced: no history of falling in past year      Home Safety:  Patient does not have trouble with stairs inside or outside of their home. Patient has working smoke alarms and has working carbon  monoxide detector. Home safety hazards include: none.     Nutrition:   Current diet is Regular.     Medications:   Patient is currently taking over-the-counter supplements. OTC medications include: see medication list. Patient is able to manage medications.     Activities of Daily Living (ADLs)/Instrumental Activities of Daily Living (IADLs):   Walk and transfer into and out of bed and chair?: Yes  Dress and groom yourself?: Yes    Bathe or shower yourself?: Yes    Feed yourself? Yes  Do your laundry/housekeeping?: Yes  Manage your money, pay your bills and track your expenses?: Yes  Make your own meals?: Yes    Do your own shopping?: Yes    Previous Hospitalizations:   Any hospitalizations or ED visits within the last 12 months?: No      Advance Care Planning:   Living will: Yes    Durable POA for healthcare: Yes    Advanced directive: Yes      Cognitive Screening:   Provider or family/friend/caregiver concerned regarding cognition?: No    Preventive Screenings      Cardiovascular Screening:    General: Screening Current      Diabetes Screening:     General: Screening Not Indicated and History Diabetes      Colorectal Cancer Screening:     General: Screening Not Indicated      Prostate Cancer Screening:    General: History Prostate Cancer and Screening Not Indicated      Lung Cancer Screening:     General: Screening Not Indicated    Screening, Brief Intervention, and Referral to Treatment (SBIRT)     Screening  Typical number of drinks in a day: 0  Typical number of drinks in a week: 0  Interpretation: Low risk drinking behavior.    AUDIT-C Screenin) How often did you have a drink containing alcohol in the past year? monthly or less  2) How many drinks did you have on a typical day when you were drinking in the past year? 1 to 2  3) How often did you have 6 or more drinks on one occasion in the past year? never    AUDIT-C Score: 1  Interpretation: Score 0-3 (male): Negative screen for alcohol misuse    Single  "Item Drug Screening:  How often have you used an illegal drug (including marijuana) or a prescription medication for non-medical reasons in the past year? never    Single Item Drug Screen Score: 0  Interpretation: Negative screen for possible drug use disorder    Social Drivers of Health     Financial Resource Strain: Low Risk  (3/28/2023)    Overall Financial Resource Strain (CARDIA)     Difficulty of Paying Living Expenses: Not hard at all   Food Insecurity: No Food Insecurity (5/13/2025)    Hunger Vital Sign     Worried About Running Out of Food in the Last Year: Never true     Ran Out of Food in the Last Year: Never true   Transportation Needs: No Transportation Needs (5/13/2025)    PRAPARE - Transportation     Lack of Transportation (Medical): No     Lack of Transportation (Non-Medical): No   Housing Stability: Unknown (5/13/2025)    Housing Stability Vital Sign     Unable to Pay for Housing in the Last Year: No     Homeless in the Last Year: No   Utilities: Not At Risk (5/13/2025)    [facility] Utilities     Threatened with loss of utilities: No     No results found.    Objective   /68   Pulse 72   Temp 97.8 °F (36.6 °C) (Tympanic)   Resp 18   Ht 5' 10\" (1.778 m)   Wt 62.7 kg (138 lb 3.2 oz)   SpO2 100%   BMI 19.83 kg/m²     Physical Exam  Constitutional:       Appearance: He is well-developed.   HENT:      Head: Normocephalic and atraumatic.   Eyes:      General:         Right eye: No discharge.         Left eye: No discharge.      Conjunctiva/sclera: Conjunctivae normal.   Cardiovascular:      Rate and Rhythm: Normal rate and regular rhythm.      Heart sounds: Normal heart sounds. No murmur heard.     No friction rub. No gallop.   Pulmonary:      Effort: Pulmonary effort is normal. No respiratory distress.      Breath sounds: Normal breath sounds. No wheezing or rales.   Abdominal:      General: Bowel sounds are normal. There is no distension.      Palpations: Abdomen is soft.      Tenderness: There is " no abdominal tenderness. There is no guarding.   Musculoskeletal:         General: Normal range of motion.      Cervical back: Normal range of motion and neck supple.      Right lower leg: No edema.      Left lower leg: No edema.   Neurological:      Mental Status: He is alert.

## 2025-05-13 NOTE — PATIENT INSTRUCTIONS
Medicare Preventive Visit Patient Instructions  Thank you for completing your Welcome to Medicare Visit or Medicare Annual Wellness Visit today. Your next wellness visit will be due in one year (5/14/2026).  The screening/preventive services that you may require over the next 5-10 years are detailed below. Some tests may not apply to you based off risk factors and/or age. Screening tests ordered at today's visit but not completed yet may show as past due. Also, please note that scanned in results may not display below.  Preventive Screenings:  Service Recommendations Previous Testing/Comments   Colorectal Cancer Screening  Colonoscopy    Fecal Occult Blood Test (FOBT)/Fecal Immunochemical Test (FIT)  Fecal DNA/Cologuard Test  Flexible Sigmoidoscopy Age: 45-75 years old   Colonoscopy: every 10 years (May be performed more frequently if at higher risk)  OR  FOBT/FIT: every 1 year  OR  Cologuard: every 3 years  OR  Sigmoidoscopy: every 5 years  Screening may be recommended earlier than age 45 if at higher risk for colorectal cancer. Also, an individualized decision between you and your healthcare provider will decide whether screening between the ages of 76-85 would be appropriate. Colonoscopy: Not on file  FOBT/FIT: Not on file  Cologuard: Not on file  Sigmoidoscopy: Not on file          Prostate Cancer Screening Individualized decision between patient and health care provider in men between ages of 55-69   Medicare will cover every 12 months beginning on the day after your 50th birthday PSA: <0.008 ng/mL           Hepatitis C Screening Once for adults born between 1945 and 1965  More frequently in patients at high risk for Hepatitis C Hep C Antibody: Not on file        Diabetes Screening 1-2 times per year if you're at risk for diabetes or have pre-diabetes Fasting glucose: 160 mg/dL (4/23/2025)  A1C: 7.3 % (4/23/2025)      Cholesterol Screening Once every 5 years if you don't have a lipid disorder. May order more  often based on risk factors. Lipid panel: 07/06/2020         Other Preventive Screenings Covered by Medicare:  Abdominal Aortic Aneurysm (AAA) Screening: covered once if your at risk. You're considered to be at risk if you have a family history of AAA or a male between the age of 65-75 who smoking at least 100 cigarettes in your lifetime.  Lung Cancer Screening: covers low dose CT scan once per year if you meet all of the following conditions: (1) Age 55-77; (2) No signs or symptoms of lung cancer; (3) Current smoker or have quit smoking within the last 15 years; (4) You have a tobacco smoking history of at least 20 pack years (packs per day x number of years you smoked); (5) You get a written order from a healthcare provider.  Glaucoma Screening: covered annually if you're considered high risk: (1) You have diabetes OR (2) Family history of glaucoma OR (3)  aged 50 and older OR (4)  American aged 65 and older  Osteoporosis Screening: covered every 2 years if you meet one of the following conditions: (1) Have a vertebral abnormality; (2) On glucocorticoid therapy for more than 3 months; (3) Have primary hyperparathyroidism; (4) On osteoporosis medications and need to assess response to drug therapy.  HIV Screening: covered annually if you're between the age of 15-65. Also covered annually if you are younger than 15 and older than 65 with risk factors for HIV infection. For pregnant patients, it is covered up to 3 times per pregnancy.    Immunizations:  Immunization Recommendations   Influenza Vaccine Annual influenza vaccination during flu season is recommended for all persons aged >= 6 months who do not have contraindications   Pneumococcal Vaccine   * Pneumococcal conjugate vaccine = PCV13 (Prevnar 13), PCV15 (Vaxneuvance), PCV20 (Prevnar 20)  * Pneumococcal polysaccharide vaccine = PPSV23 (Pneumovax) Adults 19-65 yo with certain risk factors or if 65+ yo  If never received any pneumonia  vaccine: recommend Prevnar 20 (PCV20)  Give PCV20 if previously received 1 dose of PCV13 or PPSV23   Hepatitis B Vaccine 3 dose series if at intermediate or high risk (ex: diabetes, end stage renal disease, liver disease)   Respiratory syncytial virus (RSV) Vaccine - COVERED BY MEDICARE PART D  * RSVPreF3 (Arexvy) CDC recommends that adults 60 years of age and older may receive a single dose of RSV vaccine using shared clinical decision-making (SCDM)   Tetanus (Td) Vaccine - COST NOT COVERED BY MEDICARE PART B Following completion of primary series, a booster dose should be given every 10 years to maintain immunity against tetanus. Td may also be given as tetanus wound prophylaxis.   Tdap Vaccine - COST NOT COVERED BY MEDICARE PART B Recommended at least once for all adults. For pregnant patients, recommended with each pregnancy.   Shingles Vaccine (Shingrix) - COST NOT COVERED BY MEDICARE PART B  2 shot series recommended in those 19 years and older who have or will have weakened immune systems or those 50 years and older     Health Maintenance Due:  There are no preventive care reminders to display for this patient.  Immunizations Due:      Topic Date Due   • COVID-19 Vaccine (5 - 2024-25 season) 09/01/2024     Advance Directives   What are advance directives?  Advance directives are legal documents that state your wishes and plans for medical care. These plans are made ahead of time in case you lose your ability to make decisions for yourself. Advance directives can apply to any medical decision, such as the treatments you want, and if you want to donate organs.   What are the types of advance directives?  There are many types of advance directives, and each state has rules about how to use them. You may choose a combination of any of the following:  Living will:  This is a written record of the treatment you want. You can also choose which treatments you do not want, which to limit, and which to stop at a  certain time. This includes surgery, medicine, IV fluid, and tube feedings.   Durable power of  for healthcare (DPAHC):  This is a written record that states who you want to make healthcare choices for you when you are unable to make them for yourself. This person, called a proxy, is usually a family member or a friend. You may choose more than 1 proxy.  Do not resuscitate (DNR) order:  A DNR order is used in case your heart stops beating or you stop breathing. It is a request not to have certain forms of treatment, such as CPR. A DNR order may be included in other types of advance directives.  Medical directive:  This covers the care that you want if you are in a coma, near death, or unable to make decisions for yourself. You can list the treatments you want for each condition. Treatment may include pain medicine, surgery, blood transfusions, dialysis, IV or tube feedings, and a ventilator (breathing machine).  Values history:  This document has questions about your views, beliefs, and how you feel and think about life. This information can help others choose the care that you would choose.  Why are advance directives important?  An advance directive helps you control your care. Although spoken wishes may be used, it is better to have your wishes written down. Spoken wishes can be misunderstood, or not followed. Treatments may be given even if you do not want them. An advance directive may make it easier for your family to make difficult choices about your care.       © Copyright [employer] 2018 Information is for End User's use only and may not be sold, redistributed or otherwise used for commercial purposes. All illustrations and images included in CareNotes® are the copyrighted property of A.D.A[employer]M., Inc. or [employer]

## 2025-05-13 NOTE — ASSESSMENT & PLAN NOTE
Lab Results   Component Value Date    HGBA1C 7.3 (H) 04/23/2025     Diet control.     Orders:    Blood Glucose Monitoring Suppl (OneTouch Verio Reflect) w/Device KIT; Check blood sugars once daily. Please substitute with appropriate alternative as covered by patient's insurance. Dx: E11.65    glucose blood (OneTouch Verio) test strip; Check blood sugars once daily. Please substitute with appropriate alternative as covered by patient's insurance. Dx: E11.65    OneTouch Delica Lancets 33G MISC; Check blood sugars once daily. Please substitute with appropriate alternative as covered by patient's insurance. Dx: E11.65

## 2025-05-13 NOTE — PROGRESS NOTES
Name: Bassam Tam      : 1935      MRN: [id number]  Encounter Provider: CINDY Hyde  Encounter Date: 2025   Encounter department: [facility]OLOG[facility] [place] [facility]  :  Assessment & Plan  Prostate cancer (HCC)  History of [place] 4 posterior equal 7 prostate cancer  Status post 1 dose of Lupron 45 mg in 2023 and completion of radiation therapy to prostate and left pelvic lymph node in 2024  Postradiation PSAs remain undetectable   Patient has deferred any additional ADT  Continue with observation of PSA   Plan to obtain PSA level prior to radiation oncology appointment in September  Plan to follow-up then 6 months from September with PSA prior    Orders:  •  PSA Total, Diagnostic; Future    Lab Results   Component Value Date    PSA <0.008 2025    PSA <0.008 2024    PSA <0.01 2024           History of Present Illness   Bassam Tam is a 89 y.o. male who presents prostate cancer diagnosed in 2023.  He is status post radiation therapy.  He received 1 dose of Lupron.  He deferred additional ADT therapy.  PSA level has been undetectable.  He reports being content with urination.  He feels that he has a good urinary stream.  He denies flank pain or gross hematuria.  AUA score is 10.  No other concerns at this time.    No incontinence    Patient continues to remain active with golfing and gardening.            AUA SYMPTOM SCORE      Flowsheet Row Most Recent Value   AUA SYMPTOM SCORE    How often have you had a sensation of not emptying your bladder completely after you finished urinating? 1 (P)     How often have you had to urinate again less than two hours after you finished urinating? 1 (P)     How often have you found you stopped and started again several times when you urinate? 1 (P)     How often have you found it difficult to postpone urination? 1 (P)     How often have you had a weak urinary stream? 1 (P)     How often have you had to push or strain to  "begin urination? 0 (P)     How many times did you most typically get up to urinate from the time you went to bed at night until the time you got up in the morning? 5 (P)     Quality of Life: If you were to spend the rest of your life with your urinary condition just the way it is now, how would you feel about that? 3 (P)     AUA SYMPTOM SCORE 10 (P)            Review of Systems   Constitutional:  Negative for chills and fever.   HENT:  Negative for ear pain and sore throat.    Eyes:  Negative for pain and visual disturbance.   Respiratory:  Negative for cough and shortness of breath.    Cardiovascular:  Negative for chest pain and palpitations.   Gastrointestinal:  Negative for abdominal pain and vomiting.   Genitourinary:  Negative for difficulty urinating, dysuria, flank pain, frequency and hematuria.   Musculoskeletal:  Negative for arthralgias and back pain.   Skin:  Negative for color change and rash.   Neurological:  Negative for seizures and syncope.   All other systems reviewed and are negative.         Objective   /70 (BP Location: Left arm, Patient Position: Sitting, Cuff Size: Adult)   Pulse 89   Ht 5' 10\" (1.778 m)   Wt 62.6 kg (138 lb)   SpO2 97%   BMI 19.80 kg/m²     Physical Exam  Vitals and nursing note reviewed.   Constitutional:       General: He is not in acute distress.     Appearance: He is well-developed.   HENT:      Head: Normocephalic and atraumatic.     Eyes:      Conjunctiva/sclera: Conjunctivae normal.       Cardiovascular:      Rate and Rhythm: Normal rate and regular rhythm.      Heart sounds: No murmur heard.  Pulmonary:      Effort: Pulmonary effort is normal. No respiratory distress.      Breath sounds: Normal breath sounds.   Abdominal:      Palpations: Abdomen is soft.      Tenderness: There is no abdominal tenderness.     Musculoskeletal:         General: No swelling.      Cervical back: Neck supple.     Skin:     General: Skin is warm and dry.      Capillary Refill: " Capillary refill takes less than 2 seconds.     Neurological:      Mental Status: He is alert.     Psychiatric:         Mood and Affect: Mood normal.            Results   Lab Results   Component Value Date    PSA <0.008 02/25/2025    PSA <0.008 09/06/2024    PSA <0.01 03/06/2024     Lab Results   Component Value Date    GLUCOSE 136 08/14/2015    CALCIUM 9.5 04/23/2025     08/14/2015    K 4.2 04/23/2025    CO2 27 04/23/2025     04/23/2025    BUN 23 04/23/2025    CREATININE 1.13 04/23/2025     Lab Results   Component Value Date    WBC 7.71 04/23/2025    HGB 12.6 04/23/2025    HCT 37.5 04/23/2025    MCV 89 04/23/2025     04/23/2025       Office Urine Dip  No results found for this or any previous visit (from the past hour).

## 2025-05-14 ENCOUNTER — OFFICE VISIT (OUTPATIENT)
Dept: UROLOGY | Facility: CLINIC | Age: OVER 89
End: 2025-05-14
Payer: MEDICARE

## 2025-05-14 VITALS
SYSTOLIC BLOOD PRESSURE: 104 MMHG | HEIGHT: 70 IN | DIASTOLIC BLOOD PRESSURE: 70 MMHG | WEIGHT: 138 LBS | BODY MASS INDEX: 19.76 KG/M2 | HEART RATE: 89 BPM | OXYGEN SATURATION: 97 %

## 2025-05-14 DIAGNOSIS — C61 PROSTATE CANCER (HCC): Primary | ICD-10-CM

## 2025-05-14 PROCEDURE — 99213 OFFICE O/P EST LOW 20 MIN: CPT

## 2025-05-14 NOTE — ASSESSMENT & PLAN NOTE
History of [place] 4 posterior equal 7 prostate cancer  Status post 1 dose of Lupron 45 mg in 9/2023 and completion of radiation therapy to prostate and left pelvic lymph node in 1/2024  Postradiation PSAs remain undetectable   Patient has deferred any additional ADT  Continue with observation of PSA   Plan to obtain PSA level prior to radiation oncology appointment in September  Plan to follow-up then 6 months from September with PSA prior    Orders:  •  PSA Total, Diagnostic; Future    Lab Results   Component Value Date    PSA <0.008 02/25/2025    PSA <0.008 09/06/2024    PSA <0.01 03/06/2024

## 2025-08-06 DIAGNOSIS — E03.9 ACQUIRED HYPOTHYROIDISM: ICD-10-CM

## 2025-08-08 RX ORDER — LEVOTHYROXINE SODIUM 75 UG/1
75 TABLET ORAL DAILY
Qty: 90 TABLET | Refills: 0 | OUTPATIENT
Start: 2025-08-08
